# Patient Record
Sex: MALE | Race: WHITE | NOT HISPANIC OR LATINO | Employment: OTHER | URBAN - METROPOLITAN AREA
[De-identification: names, ages, dates, MRNs, and addresses within clinical notes are randomized per-mention and may not be internally consistent; named-entity substitution may affect disease eponyms.]

---

## 2019-01-01 ENCOUNTER — ANESTHESIA (OUTPATIENT)
Dept: SURGERY | Facility: HOSPITAL | Age: 64
DRG: 268 | End: 2019-01-01
Payer: MEDICAID

## 2019-01-01 ENCOUNTER — HOSPITAL ENCOUNTER (INPATIENT)
Facility: HOSPITAL | Age: 64
LOS: 10 days | DRG: 268 | End: 2019-07-17
Attending: EMERGENCY MEDICINE | Admitting: SURGERY
Payer: MEDICAID

## 2019-01-01 ENCOUNTER — TELEPHONE (OUTPATIENT)
Dept: UROLOGY | Facility: CLINIC | Age: 64
End: 2019-01-01

## 2019-01-01 ENCOUNTER — ANESTHESIA EVENT (OUTPATIENT)
Dept: SURGERY | Facility: HOSPITAL | Age: 64
DRG: 268 | End: 2019-01-01
Payer: MEDICAID

## 2019-01-01 ENCOUNTER — HOSPITAL ENCOUNTER (INPATIENT)
Facility: HOSPITAL | Age: 64
LOS: 6 days | Discharge: HOME | DRG: 291 | End: 2019-04-11
Attending: EMERGENCY MEDICINE | Admitting: HOSPITALIST
Payer: MEDICAID

## 2019-01-01 ENCOUNTER — LAB VISIT (OUTPATIENT)
Dept: LAB | Facility: HOSPITAL | Age: 64
End: 2019-01-01
Attending: EMERGENCY MEDICINE
Payer: MEDICAID

## 2019-01-01 ENCOUNTER — CLINICAL SUPPORT (OUTPATIENT)
Dept: SMOKING CESSATION | Facility: CLINIC | Age: 64
DRG: 291 | End: 2019-01-01
Attending: SURGERY
Payer: MEDICAID

## 2019-01-01 VITALS
BODY MASS INDEX: 20.55 KG/M2 | DIASTOLIC BLOOD PRESSURE: 59 MMHG | RESPIRATION RATE: 16 BRPM | HEART RATE: 106 BPM | WEIGHT: 135.56 LBS | TEMPERATURE: 98 F | HEIGHT: 68 IN | SYSTOLIC BLOOD PRESSURE: 123 MMHG | OXYGEN SATURATION: 96 %

## 2019-01-01 VITALS
DIASTOLIC BLOOD PRESSURE: 58 MMHG | HEIGHT: 67 IN | HEART RATE: 88 BPM | SYSTOLIC BLOOD PRESSURE: 110 MMHG | WEIGHT: 141.56 LBS | OXYGEN SATURATION: 97 % | TEMPERATURE: 96 F | RESPIRATION RATE: 14 BRPM | BODY MASS INDEX: 22.22 KG/M2

## 2019-01-01 DIAGNOSIS — I71.40 ABDOMINAL AORTIC ANEURYSM (AAA) WITHOUT RUPTURE: ICD-10-CM

## 2019-01-01 DIAGNOSIS — I71.40 AAA (ABDOMINAL AORTIC ANEURYSM) WITHOUT RUPTURE: Primary | ICD-10-CM

## 2019-01-01 DIAGNOSIS — F17.200 NICOTINE DEPENDENCE: Primary | ICD-10-CM

## 2019-01-01 DIAGNOSIS — I71.40 AAA (ABDOMINAL AORTIC ANEURYSM) WITHOUT RUPTURE: ICD-10-CM

## 2019-01-01 DIAGNOSIS — I25.10 CORONARY ARTERY DISEASE INVOLVING NATIVE CORONARY ARTERY OF NATIVE HEART WITHOUT ANGINA PECTORIS: ICD-10-CM

## 2019-01-01 DIAGNOSIS — I71.40 AAA (ABDOMINAL AORTIC ANEURYSM): ICD-10-CM

## 2019-01-01 DIAGNOSIS — I50.9 CONGESTIVE HEART FAILURE, UNSPECIFIED HF CHRONICITY, UNSPECIFIED HEART FAILURE TYPE: ICD-10-CM

## 2019-01-01 DIAGNOSIS — Z72.0 TOBACCO ABUSE: ICD-10-CM

## 2019-01-01 DIAGNOSIS — I50.9 ACUTE ON CHRONIC HEART FAILURE: ICD-10-CM

## 2019-01-01 DIAGNOSIS — J96.22 ACUTE ON CHRONIC RESPIRATORY FAILURE WITH HYPOXIA AND HYPERCAPNIA: ICD-10-CM

## 2019-01-01 DIAGNOSIS — J96.21 ACUTE ON CHRONIC RESPIRATORY FAILURE WITH HYPOXIA AND HYPERCAPNIA: ICD-10-CM

## 2019-01-01 DIAGNOSIS — I73.9 PERIPHERAL VASCULAR DISEASE: ICD-10-CM

## 2019-01-01 DIAGNOSIS — I21.4 NSTEMI (NON-ST ELEVATED MYOCARDIAL INFARCTION): ICD-10-CM

## 2019-01-01 DIAGNOSIS — N40.0 BENIGN PROSTATIC HYPERPLASIA, UNSPECIFIED WHETHER LOWER URINARY TRACT SYMPTOMS PRESENT: ICD-10-CM

## 2019-01-01 DIAGNOSIS — I71.43 ANEURYSM OF INFRARENAL ABDOMINAL AORTA: ICD-10-CM

## 2019-01-01 DIAGNOSIS — R33.8 ACUTE RETENTION OF URINE: ICD-10-CM

## 2019-01-01 DIAGNOSIS — I49.9 CARDIAC RHYTHM DISORDER OR DISTURBANCE OR CHANGE: ICD-10-CM

## 2019-01-01 DIAGNOSIS — R00.0 TACHYCARDIA: ICD-10-CM

## 2019-01-01 DIAGNOSIS — I25.10 CORONARY ARTERY DISEASE, ANGINA PRESENCE UNSPECIFIED, UNSPECIFIED VESSEL OR LESION TYPE, UNSPECIFIED WHETHER NATIVE OR TRANSPLANTED HEART: ICD-10-CM

## 2019-01-01 DIAGNOSIS — I50.9 ACUTE ON CHRONIC CONGESTIVE HEART FAILURE, UNSPECIFIED HEART FAILURE TYPE: Primary | ICD-10-CM

## 2019-01-01 DIAGNOSIS — R10.9 ABDOMINAL PAIN, UNSPECIFIED ABDOMINAL LOCATION: ICD-10-CM

## 2019-01-01 DIAGNOSIS — I10 HYPERTENSION, UNSPECIFIED TYPE: ICD-10-CM

## 2019-01-01 DIAGNOSIS — Z03.89 ENCOUNTER FOR OBSERVATION FOR OTHER SUSPECTED DISEASES AND CONDITIONS RULED OUT: ICD-10-CM

## 2019-01-01 DIAGNOSIS — Z72.0 TOBACCO USE: ICD-10-CM

## 2019-01-01 DIAGNOSIS — R94.31 PROLONGED QT INTERVAL: ICD-10-CM

## 2019-01-01 DIAGNOSIS — I50.9 HEART FAILURE: ICD-10-CM

## 2019-01-01 DIAGNOSIS — I65.23 BILATERAL CAROTID ARTERY STENOSIS: ICD-10-CM

## 2019-01-01 DIAGNOSIS — I50.9 CHF (CONGESTIVE HEART FAILURE): ICD-10-CM

## 2019-01-01 DIAGNOSIS — I50.43 ACUTE ON CHRONIC COMBINED SYSTOLIC AND DIASTOLIC CONGESTIVE HEART FAILURE: ICD-10-CM

## 2019-01-01 DIAGNOSIS — N18.30 CKD (CHRONIC KIDNEY DISEASE) STAGE 3, GFR 30-59 ML/MIN: ICD-10-CM

## 2019-01-01 DIAGNOSIS — R06.09 EXERTIONAL DYSPNEA: ICD-10-CM

## 2019-01-01 LAB
ABO + RH BLD: NORMAL
ALBUMIN SERPL BCP-MCNC: 2 G/DL (ref 3.5–5.2)
ALBUMIN SERPL BCP-MCNC: 2.1 G/DL (ref 3.5–5.2)
ALBUMIN SERPL BCP-MCNC: 2.2 G/DL (ref 3.5–5.2)
ALBUMIN SERPL BCP-MCNC: 2.2 G/DL (ref 3.5–5.2)
ALBUMIN SERPL BCP-MCNC: 2.3 G/DL (ref 3.5–5.2)
ALBUMIN SERPL BCP-MCNC: 2.4 G/DL (ref 3.5–5.2)
ALBUMIN SERPL BCP-MCNC: 2.4 G/DL (ref 3.5–5.2)
ALBUMIN SERPL BCP-MCNC: 2.5 G/DL (ref 3.5–5.2)
ALBUMIN SERPL BCP-MCNC: 2.6 G/DL (ref 3.5–5.2)
ALBUMIN SERPL BCP-MCNC: 2.7 G/DL (ref 3.5–5.2)
ALBUMIN SERPL BCP-MCNC: 2.7 G/DL (ref 3.5–5.2)
ALBUMIN SERPL BCP-MCNC: 2.8 G/DL (ref 3.5–5.2)
ALBUMIN SERPL BCP-MCNC: 2.9 G/DL (ref 3.5–5.2)
ALBUMIN SERPL BCP-MCNC: 3.1 G/DL (ref 3.5–5.2)
ALLENS TEST: ABNORMAL
ALLENS TEST: NORMAL
ALP SERPL-CCNC: 61 U/L (ref 55–135)
ALP SERPL-CCNC: 62 U/L (ref 55–135)
ALP SERPL-CCNC: 62 U/L (ref 55–135)
ALP SERPL-CCNC: 64 U/L (ref 55–135)
ALP SERPL-CCNC: 65 U/L (ref 55–135)
ALP SERPL-CCNC: 65 U/L (ref 55–135)
ALP SERPL-CCNC: 68 U/L (ref 55–135)
ALP SERPL-CCNC: 70 U/L (ref 55–135)
ALP SERPL-CCNC: 71 U/L (ref 55–135)
ALP SERPL-CCNC: 72 U/L (ref 55–135)
ALP SERPL-CCNC: 75 U/L (ref 55–135)
ALP SERPL-CCNC: 77 U/L (ref 55–135)
ALP SERPL-CCNC: 77 U/L (ref 55–135)
ALP SERPL-CCNC: 78 U/L (ref 55–135)
ALP SERPL-CCNC: 81 U/L (ref 55–135)
ALP SERPL-CCNC: 83 U/L (ref 55–135)
ALP SERPL-CCNC: 83 U/L (ref 55–135)
ALP SERPL-CCNC: 87 U/L (ref 55–135)
ALP SERPL-CCNC: 90 U/L (ref 55–135)
ALP SERPL-CCNC: 95 U/L (ref 55–135)
ALT SERPL W/O P-5'-P-CCNC: 10 U/L (ref 10–44)
ALT SERPL W/O P-5'-P-CCNC: 11 U/L (ref 10–44)
ALT SERPL W/O P-5'-P-CCNC: 12 U/L (ref 10–44)
ALT SERPL W/O P-5'-P-CCNC: 13 U/L (ref 10–44)
ALT SERPL W/O P-5'-P-CCNC: 15 U/L (ref 10–44)
ALT SERPL W/O P-5'-P-CCNC: 17 U/L (ref 10–44)
ALT SERPL W/O P-5'-P-CCNC: 17 U/L (ref 10–44)
ALT SERPL W/O P-5'-P-CCNC: 21 U/L (ref 10–44)
ALT SERPL W/O P-5'-P-CCNC: 22 U/L (ref 10–44)
ALT SERPL W/O P-5'-P-CCNC: 23 U/L (ref 10–44)
ALT SERPL W/O P-5'-P-CCNC: 23 U/L (ref 10–44)
ALT SERPL W/O P-5'-P-CCNC: 25 U/L (ref 10–44)
ALT SERPL W/O P-5'-P-CCNC: 29 U/L (ref 10–44)
ALT SERPL W/O P-5'-P-CCNC: 29 U/L (ref 10–44)
ALT SERPL W/O P-5'-P-CCNC: 30 U/L (ref 10–44)
ALT SERPL W/O P-5'-P-CCNC: 5 U/L (ref 10–44)
ALT SERPL W/O P-5'-P-CCNC: 7 U/L (ref 10–44)
ALT SERPL W/O P-5'-P-CCNC: 7 U/L (ref 10–44)
ALT SERPL W/O P-5'-P-CCNC: 9 U/L (ref 10–44)
ANION GAP SERPL CALC-SCNC: 10 MMOL/L (ref 8–16)
ANION GAP SERPL CALC-SCNC: 11 MMOL/L (ref 8–16)
ANION GAP SERPL CALC-SCNC: 12 MMOL/L (ref 8–16)
ANION GAP SERPL CALC-SCNC: 6 MMOL/L (ref 8–16)
ANION GAP SERPL CALC-SCNC: 7 MMOL/L (ref 8–16)
ANION GAP SERPL CALC-SCNC: 8 MMOL/L (ref 8–16)
ANION GAP SERPL CALC-SCNC: 9 MMOL/L (ref 8–16)
ANISOCYTOSIS BLD QL SMEAR: SLIGHT
ANISOCYTOSIS BLD QL SMEAR: SLIGHT
APTT BLDCRRT: 100.7 SEC (ref 21–32)
APTT BLDCRRT: 28.3 SEC (ref 21–32)
APTT BLDCRRT: 28.6 SEC (ref 21–32)
APTT BLDCRRT: 38.5 SEC (ref 21–32)
APTT BLDCRRT: 61.7 SEC (ref 21–32)
APTT BLDCRRT: 91 SEC (ref 21–32)
ASCENDING AORTA: 3.07 CM
AST SERPL-CCNC: 13 U/L (ref 10–40)
AST SERPL-CCNC: 14 U/L (ref 10–40)
AST SERPL-CCNC: 14 U/L (ref 10–40)
AST SERPL-CCNC: 15 U/L (ref 10–40)
AST SERPL-CCNC: 15 U/L (ref 10–40)
AST SERPL-CCNC: 18 U/L (ref 10–40)
AST SERPL-CCNC: 20 U/L (ref 10–40)
AST SERPL-CCNC: 21 U/L (ref 10–40)
AST SERPL-CCNC: 23 U/L (ref 10–40)
AST SERPL-CCNC: 23 U/L (ref 10–40)
AST SERPL-CCNC: 25 U/L (ref 10–40)
AST SERPL-CCNC: 31 U/L (ref 10–40)
AST SERPL-CCNC: 32 U/L (ref 10–40)
AST SERPL-CCNC: 34 U/L (ref 10–40)
AST SERPL-CCNC: 35 U/L (ref 10–40)
AST SERPL-CCNC: 38 U/L (ref 10–40)
AST SERPL-CCNC: 38 U/L (ref 10–40)
AST SERPL-CCNC: 40 U/L (ref 10–40)
AST SERPL-CCNC: 40 U/L (ref 10–40)
AV INDEX (PROSTH): 0.86
AV MEAN GRADIENT: 2 MMHG
AV PEAK GRADIENT: 3 MMHG
AV VALVE AREA: 3.33 CM2
AV VELOCITY RATIO: 0.82
BACTERIA #/AREA URNS AUTO: ABNORMAL /HPF
BACTERIA BAL AEROBE CULT: ABNORMAL
BACTERIA BLD CULT: NORMAL
BACTERIA BLD CULT: NORMAL
BASO STIPL BLD QL SMEAR: ABNORMAL
BASOPHILS # BLD AUTO: 0.02 K/UL (ref 0–0.2)
BASOPHILS # BLD AUTO: 0.03 K/UL (ref 0–0.2)
BASOPHILS # BLD AUTO: 0.04 K/UL (ref 0–0.2)
BASOPHILS # BLD AUTO: 0.05 K/UL (ref 0–0.2)
BASOPHILS NFR BLD: 0.2 % (ref 0–1.9)
BASOPHILS NFR BLD: 0.3 % (ref 0–1.9)
BASOPHILS NFR BLD: 0.4 % (ref 0–1.9)
BASOPHILS NFR BLD: 0.5 % (ref 0–1.9)
BASOPHILS NFR BLD: 0.6 % (ref 0–1.9)
BASOPHILS NFR BLD: 0.7 % (ref 0–1.9)
BASOPHILS NFR BLD: 0.8 % (ref 0–1.9)
BASOPHILS NFR BLD: 0.9 % (ref 0–1.9)
BILIRUB SERPL-MCNC: 0.3 MG/DL (ref 0.1–1)
BILIRUB SERPL-MCNC: 0.4 MG/DL (ref 0.1–1)
BILIRUB SERPL-MCNC: 0.5 MG/DL (ref 0.1–1)
BILIRUB SERPL-MCNC: 0.6 MG/DL (ref 0.1–1)
BILIRUB SERPL-MCNC: 0.7 MG/DL (ref 0.1–1)
BILIRUB SERPL-MCNC: 0.7 MG/DL (ref 0.1–1)
BILIRUB SERPL-MCNC: 0.9 MG/DL (ref 0.1–1)
BILIRUB SERPL-MCNC: 0.9 MG/DL (ref 0.1–1)
BILIRUB UR QL STRIP: NEGATIVE
BLD GP AB SCN CELLS X3 SERPL QL: NORMAL
BLD PROD TYP BPU: NORMAL
BLD PROD TYP BPU: NORMAL
BLOOD GROUP ANTIBODIES SERPL: NORMAL
BLOOD UNIT EXPIRATION DATE: NORMAL
BLOOD UNIT EXPIRATION DATE: NORMAL
BLOOD UNIT TYPE CODE: 6200
BLOOD UNIT TYPE CODE: 6200
BLOOD UNIT TYPE: NORMAL
BLOOD UNIT TYPE: NORMAL
BNP SERPL-MCNC: 1123 PG/ML (ref 0–99)
BNP SERPL-MCNC: 2198 PG/ML (ref 0–99)
BNP SERPL-MCNC: 2526 PG/ML (ref 0–99)
BSA FOR ECHO PROCEDURE: 1.71 M2
BSA FOR ECHO PROCEDURE: 1.72 M2
BUN SERPL-MCNC: 17 MG/DL (ref 8–23)
BUN SERPL-MCNC: 17 MG/DL (ref 8–23)
BUN SERPL-MCNC: 20 MG/DL (ref 8–23)
BUN SERPL-MCNC: 20 MG/DL (ref 8–23)
BUN SERPL-MCNC: 21 MG/DL (ref 8–23)
BUN SERPL-MCNC: 22 MG/DL (ref 8–23)
BUN SERPL-MCNC: 23 MG/DL (ref 8–23)
BUN SERPL-MCNC: 24 MG/DL (ref 8–23)
BUN SERPL-MCNC: 24 MG/DL (ref 8–23)
BUN SERPL-MCNC: 25 MG/DL (ref 8–23)
BUN SERPL-MCNC: 26 MG/DL (ref 8–23)
BUN SERPL-MCNC: 27 MG/DL (ref 8–23)
BUN SERPL-MCNC: 31 MG/DL (ref 8–23)
BUN SERPL-MCNC: 37 MG/DL (ref 8–23)
CALCIUM SERPL-MCNC: 8.2 MG/DL (ref 8.7–10.5)
CALCIUM SERPL-MCNC: 8.4 MG/DL (ref 8.7–10.5)
CALCIUM SERPL-MCNC: 8.4 MG/DL (ref 8.7–10.5)
CALCIUM SERPL-MCNC: 8.6 MG/DL (ref 8.7–10.5)
CALCIUM SERPL-MCNC: 8.7 MG/DL (ref 8.7–10.5)
CALCIUM SERPL-MCNC: 8.7 MG/DL (ref 8.7–10.5)
CALCIUM SERPL-MCNC: 8.8 MG/DL (ref 8.7–10.5)
CALCIUM SERPL-MCNC: 9.1 MG/DL (ref 8.7–10.5)
CALCIUM SERPL-MCNC: 9.2 MG/DL (ref 8.7–10.5)
CALCIUM SERPL-MCNC: 9.3 MG/DL (ref 8.7–10.5)
CALCIUM SERPL-MCNC: 9.4 MG/DL (ref 8.7–10.5)
CALCIUM SERPL-MCNC: 9.6 MG/DL (ref 8.7–10.5)
CALCIUM SERPL-MCNC: 9.8 MG/DL (ref 8.7–10.5)
CEA SERPL-MCNC: 1.9 NG/ML (ref 0–5)
CHLORIDE SERPL-SCNC: 100 MMOL/L (ref 95–110)
CHLORIDE SERPL-SCNC: 101 MMOL/L (ref 95–110)
CHLORIDE SERPL-SCNC: 102 MMOL/L (ref 95–110)
CHLORIDE SERPL-SCNC: 103 MMOL/L (ref 95–110)
CHLORIDE SERPL-SCNC: 104 MMOL/L (ref 95–110)
CHLORIDE SERPL-SCNC: 105 MMOL/L (ref 95–110)
CHLORIDE SERPL-SCNC: 94 MMOL/L (ref 95–110)
CHLORIDE SERPL-SCNC: 94 MMOL/L (ref 95–110)
CHLORIDE SERPL-SCNC: 95 MMOL/L (ref 95–110)
CHLORIDE SERPL-SCNC: 96 MMOL/L (ref 95–110)
CHLORIDE SERPL-SCNC: 97 MMOL/L (ref 95–110)
CHLORIDE SERPL-SCNC: 98 MMOL/L (ref 95–110)
CHLORIDE SERPL-SCNC: 98 MMOL/L (ref 95–110)
CHLORIDE SERPL-SCNC: 99 MMOL/L (ref 95–110)
CLARITY UR REFRACT.AUTO: CLEAR
CO2 SERPL-SCNC: 21 MMOL/L (ref 23–29)
CO2 SERPL-SCNC: 23 MMOL/L (ref 23–29)
CO2 SERPL-SCNC: 24 MMOL/L (ref 23–29)
CO2 SERPL-SCNC: 25 MMOL/L (ref 23–29)
CO2 SERPL-SCNC: 26 MMOL/L (ref 23–29)
CO2 SERPL-SCNC: 27 MMOL/L (ref 23–29)
CO2 SERPL-SCNC: 29 MMOL/L (ref 23–29)
CODING SYSTEM: NORMAL
CODING SYSTEM: NORMAL
COLOR UR AUTO: YELLOW
CREAT SERPL-MCNC: 1 MG/DL (ref 0.5–1.4)
CREAT SERPL-MCNC: 1.1 MG/DL (ref 0.5–1.4)
CREAT SERPL-MCNC: 1.2 MG/DL (ref 0.5–1.4)
CREAT SERPL-MCNC: 1.3 MG/DL (ref 0.5–1.4)
CREAT SERPL-MCNC: 1.3 MG/DL (ref 0.5–1.4)
CREAT SERPL-MCNC: 1.4 MG/DL (ref 0.5–1.4)
CREAT SERPL-MCNC: 1.5 MG/DL (ref 0.5–1.4)
CREAT SERPL-MCNC: 1.5 MG/DL (ref 0.5–1.4)
CREAT SERPL-MCNC: 1.6 MG/DL (ref 0.5–1.4)
CREAT SERPL-MCNC: 1.7 MG/DL (ref 0.5–1.4)
CREAT SERPL-MCNC: 1.8 MG/DL (ref 0.5–1.4)
CV ECHO LV RWT: 0.22 CM
CV ECHO LV RWT: 0.39 CM
DELSYS: ABNORMAL
DELSYS: NORMAL
DIFFERENTIAL METHOD: ABNORMAL
DISPENSE STATUS: NORMAL
DISPENSE STATUS: NORMAL
DOP CALC AO PEAK VEL: 0.9 M/S
DOP CALC AO VTI: 12.22 CM
DOP CALC LVOT AREA: 3.9 CM2
DOP CALC LVOT AREA: 4.1 CM2
DOP CALC LVOT DIAMETER: 2.22 CM
DOP CALC LVOT DIAMETER: 2.29 CM
DOP CALC LVOT PEAK VEL: 0.71 M/S
DOP CALC LVOT PEAK VEL: 0.74 M/S
DOP CALC LVOT STROKE VOLUME: 40.74 CM3
DOP CALC LVOT STROKE VOLUME: 43.72 CM3
DOP CALCLVOT PEAK VEL VTI: 10.53 CM
DOP CALCLVOT PEAK VEL VTI: 10.62 CM
E/E' RATIO: 6 M/S
ECHO LV POSTERIOR WALL: 0.62 CM (ref 0.6–1.1)
ECHO LV POSTERIOR WALL: 0.98 CM (ref 0.6–1.1)
EOSINOPHIL # BLD AUTO: 0 K/UL (ref 0–0.5)
EOSINOPHIL # BLD AUTO: 0.1 K/UL (ref 0–0.5)
EOSINOPHIL NFR BLD: 0 % (ref 0–8)
EOSINOPHIL NFR BLD: 0 % (ref 0–8)
EOSINOPHIL NFR BLD: 0.1 % (ref 0–8)
EOSINOPHIL NFR BLD: 0.2 % (ref 0–8)
EOSINOPHIL NFR BLD: 0.3 % (ref 0–8)
EOSINOPHIL NFR BLD: 0.4 % (ref 0–8)
EOSINOPHIL NFR BLD: 0.4 % (ref 0–8)
EOSINOPHIL NFR BLD: 0.5 % (ref 0–8)
EOSINOPHIL NFR BLD: 0.6 % (ref 0–8)
EOSINOPHIL NFR BLD: 0.7 % (ref 0–8)
EOSINOPHIL NFR BLD: 0.7 % (ref 0–8)
EOSINOPHIL NFR BLD: 1 % (ref 0–8)
EOSINOPHIL NFR BLD: 1.4 % (ref 0–8)
EOSINOPHIL NFR BLD: 1.8 % (ref 0–8)
EOSINOPHIL NFR BLD: 2.6 % (ref 0–8)
EOSINOPHIL NFR BLD: 3.4 % (ref 0–8)
ERYTHROCYTE [DISTWIDTH] IN BLOOD BY AUTOMATED COUNT: 14.4 % (ref 11.5–14.5)
ERYTHROCYTE [DISTWIDTH] IN BLOOD BY AUTOMATED COUNT: 14.4 % (ref 11.5–14.5)
ERYTHROCYTE [DISTWIDTH] IN BLOOD BY AUTOMATED COUNT: 14.5 % (ref 11.5–14.5)
ERYTHROCYTE [DISTWIDTH] IN BLOOD BY AUTOMATED COUNT: 14.6 % (ref 11.5–14.5)
ERYTHROCYTE [DISTWIDTH] IN BLOOD BY AUTOMATED COUNT: 14.6 % (ref 11.5–14.5)
ERYTHROCYTE [DISTWIDTH] IN BLOOD BY AUTOMATED COUNT: 15.6 % (ref 11.5–14.5)
ERYTHROCYTE [DISTWIDTH] IN BLOOD BY AUTOMATED COUNT: 15.6 % (ref 11.5–14.5)
ERYTHROCYTE [DISTWIDTH] IN BLOOD BY AUTOMATED COUNT: 15.7 % (ref 11.5–14.5)
ERYTHROCYTE [DISTWIDTH] IN BLOOD BY AUTOMATED COUNT: 15.7 % (ref 11.5–14.5)
ERYTHROCYTE [DISTWIDTH] IN BLOOD BY AUTOMATED COUNT: 15.8 % (ref 11.5–14.5)
ERYTHROCYTE [DISTWIDTH] IN BLOOD BY AUTOMATED COUNT: 15.9 % (ref 11.5–14.5)
ERYTHROCYTE [DISTWIDTH] IN BLOOD BY AUTOMATED COUNT: 16 % (ref 11.5–14.5)
ERYTHROCYTE [DISTWIDTH] IN BLOOD BY AUTOMATED COUNT: 16.1 % (ref 11.5–14.5)
ERYTHROCYTE [DISTWIDTH] IN BLOOD BY AUTOMATED COUNT: 16.3 % (ref 11.5–14.5)
ERYTHROCYTE [DISTWIDTH] IN BLOOD BY AUTOMATED COUNT: 16.4 % (ref 11.5–14.5)
ERYTHROCYTE [DISTWIDTH] IN BLOOD BY AUTOMATED COUNT: 16.5 % (ref 11.5–14.5)
ERYTHROCYTE [SEDIMENTATION RATE] IN BLOOD BY WESTERGREN METHOD: 16 MM/H
ERYTHROCYTE [SEDIMENTATION RATE] IN BLOOD BY WESTERGREN METHOD: 21 MM/H
EST. GFR  (AFRICAN AMERICAN): 45.3 ML/MIN/1.73 M^2
EST. GFR  (AFRICAN AMERICAN): 48.5 ML/MIN/1.73 M^2
EST. GFR  (AFRICAN AMERICAN): 52.2 ML/MIN/1.73 M^2
EST. GFR  (AFRICAN AMERICAN): 56.5 ML/MIN/1.73 M^2
EST. GFR  (AFRICAN AMERICAN): 56.5 ML/MIN/1.73 M^2
EST. GFR  (AFRICAN AMERICAN): >60 ML/MIN/1.73 M^2
EST. GFR  (NON AFRICAN AMERICAN): 39.2 ML/MIN/1.73 M^2
EST. GFR  (NON AFRICAN AMERICAN): 42 ML/MIN/1.73 M^2
EST. GFR  (NON AFRICAN AMERICAN): 45.2 ML/MIN/1.73 M^2
EST. GFR  (NON AFRICAN AMERICAN): 48.8 ML/MIN/1.73 M^2
EST. GFR  (NON AFRICAN AMERICAN): 48.8 ML/MIN/1.73 M^2
EST. GFR  (NON AFRICAN AMERICAN): 52.7 ML/MIN/1.73 M^2
EST. GFR  (NON AFRICAN AMERICAN): 52.7 ML/MIN/1.73 M^2
EST. GFR  (NON AFRICAN AMERICAN): 53.1 ML/MIN/1.73 M^2
EST. GFR  (NON AFRICAN AMERICAN): 58.1 ML/MIN/1.73 M^2
EST. GFR  (NON AFRICAN AMERICAN): 58.1 ML/MIN/1.73 M^2
EST. GFR  (NON AFRICAN AMERICAN): >60 ML/MIN/1.73 M^2
FIO2: 21
FIO2: 28
FIO2: 50
FLOW: 2
FRACTIONAL SHORTENING: 15 % (ref 28–44)
FRACTIONAL SHORTENING: 7 % (ref 28–44)
GLUCOSE SERPL-MCNC: 111 MG/DL (ref 70–110)
GLUCOSE SERPL-MCNC: 112 MG/DL (ref 70–110)
GLUCOSE SERPL-MCNC: 121 MG/DL (ref 70–110)
GLUCOSE SERPL-MCNC: 130 MG/DL (ref 70–110)
GLUCOSE SERPL-MCNC: 69 MG/DL (ref 70–110)
GLUCOSE SERPL-MCNC: 70 MG/DL (ref 70–110)
GLUCOSE SERPL-MCNC: 70 MG/DL (ref 70–110)
GLUCOSE SERPL-MCNC: 72 MG/DL (ref 70–110)
GLUCOSE SERPL-MCNC: 75 MG/DL (ref 70–110)
GLUCOSE SERPL-MCNC: 76 MG/DL (ref 70–110)
GLUCOSE SERPL-MCNC: 76 MG/DL (ref 70–110)
GLUCOSE SERPL-MCNC: 81 MG/DL (ref 70–110)
GLUCOSE SERPL-MCNC: 82 MG/DL (ref 70–110)
GLUCOSE SERPL-MCNC: 84 MG/DL (ref 70–110)
GLUCOSE SERPL-MCNC: 85 MG/DL (ref 70–110)
GLUCOSE SERPL-MCNC: 85 MG/DL (ref 70–110)
GLUCOSE SERPL-MCNC: 91 MG/DL (ref 70–110)
GLUCOSE SERPL-MCNC: 91 MG/DL (ref 70–110)
GLUCOSE SERPL-MCNC: 92 MG/DL (ref 70–110)
GLUCOSE SERPL-MCNC: 94 MG/DL (ref 70–110)
GLUCOSE SERPL-MCNC: 95 MG/DL (ref 70–110)
GLUCOSE SERPL-MCNC: 95 MG/DL (ref 70–110)
GLUCOSE SERPL-MCNC: 98 MG/DL (ref 70–110)
GLUCOSE UR QL STRIP: NEGATIVE
GRAM STN SPEC: ABNORMAL
HCO3 UR-SCNC: 24.1 MMOL/L (ref 24–28)
HCO3 UR-SCNC: 25.1 MMOL/L (ref 24–28)
HCO3 UR-SCNC: 27.1 MMOL/L (ref 24–28)
HCO3 UR-SCNC: 30.2 MMOL/L (ref 24–28)
HCO3 UR-SCNC: 30.5 MMOL/L (ref 24–28)
HCO3 UR-SCNC: 30.8 MMOL/L (ref 24–28)
HCT VFR BLD AUTO: 29.4 % (ref 40–54)
HCT VFR BLD AUTO: 30 % (ref 40–54)
HCT VFR BLD AUTO: 31.6 % (ref 40–54)
HCT VFR BLD AUTO: 31.8 % (ref 40–54)
HCT VFR BLD AUTO: 32 % (ref 40–54)
HCT VFR BLD AUTO: 32.4 % (ref 40–54)
HCT VFR BLD AUTO: 33.1 % (ref 40–54)
HCT VFR BLD AUTO: 33.5 % (ref 40–54)
HCT VFR BLD AUTO: 33.6 % (ref 40–54)
HCT VFR BLD AUTO: 33.7 % (ref 40–54)
HCT VFR BLD AUTO: 34.6 % (ref 40–54)
HCT VFR BLD AUTO: 35 % (ref 40–54)
HCT VFR BLD AUTO: 35.7 % (ref 40–54)
HCT VFR BLD AUTO: 35.7 % (ref 40–54)
HCT VFR BLD AUTO: 36.4 % (ref 40–54)
HCT VFR BLD AUTO: 36.9 % (ref 40–54)
HCT VFR BLD AUTO: 36.9 % (ref 40–54)
HCT VFR BLD AUTO: 37 % (ref 40–54)
HCT VFR BLD AUTO: 39.4 % (ref 40–54)
HCT VFR BLD AUTO: 39.8 % (ref 40–54)
HCT VFR BLD AUTO: 42.6 % (ref 40–54)
HCT VFR BLD AUTO: 43 % (ref 40–54)
HCT VFR BLD CALC: 31 %PCV (ref 36–54)
HCT VFR BLD CALC: 34 %PCV (ref 36–54)
HGB BLD-MCNC: 10 G/DL (ref 14–18)
HGB BLD-MCNC: 10.3 G/DL (ref 14–18)
HGB BLD-MCNC: 10.4 G/DL (ref 14–18)
HGB BLD-MCNC: 10.5 G/DL (ref 14–18)
HGB BLD-MCNC: 10.7 G/DL (ref 14–18)
HGB BLD-MCNC: 10.8 G/DL (ref 14–18)
HGB BLD-MCNC: 10.9 G/DL (ref 14–18)
HGB BLD-MCNC: 11.3 G/DL (ref 14–18)
HGB BLD-MCNC: 11.4 G/DL (ref 14–18)
HGB BLD-MCNC: 11.6 G/DL (ref 14–18)
HGB BLD-MCNC: 12 G/DL (ref 14–18)
HGB BLD-MCNC: 12.1 G/DL (ref 14–18)
HGB BLD-MCNC: 12.3 G/DL (ref 14–18)
HGB BLD-MCNC: 12.8 G/DL (ref 14–18)
HGB BLD-MCNC: 13.6 G/DL (ref 14–18)
HGB BLD-MCNC: 13.9 G/DL (ref 14–18)
HGB BLD-MCNC: 9.4 G/DL (ref 14–18)
HGB BLD-MCNC: 9.4 G/DL (ref 14–18)
HGB BLD-MCNC: 9.9 G/DL (ref 14–18)
HGB UR QL STRIP: ABNORMAL
HYALINE CASTS UR QL AUTO: 0 /LPF
IMM GRANULOCYTES # BLD AUTO: 0.01 K/UL (ref 0–0.04)
IMM GRANULOCYTES # BLD AUTO: 0.02 K/UL (ref 0–0.04)
IMM GRANULOCYTES # BLD AUTO: 0.03 K/UL (ref 0–0.04)
IMM GRANULOCYTES # BLD AUTO: 0.04 K/UL (ref 0–0.04)
IMM GRANULOCYTES # BLD AUTO: 0.04 K/UL (ref 0–0.04)
IMM GRANULOCYTES # BLD AUTO: 0.06 K/UL (ref 0–0.04)
IMM GRANULOCYTES # BLD AUTO: 0.07 K/UL (ref 0–0.04)
IMM GRANULOCYTES # BLD AUTO: 0.08 K/UL (ref 0–0.04)
IMM GRANULOCYTES # BLD AUTO: 0.09 K/UL (ref 0–0.04)
IMM GRANULOCYTES # BLD AUTO: 0.09 K/UL (ref 0–0.04)
IMM GRANULOCYTES # BLD AUTO: 0.1 K/UL (ref 0–0.04)
IMM GRANULOCYTES # BLD AUTO: 0.11 K/UL (ref 0–0.04)
IMM GRANULOCYTES # BLD AUTO: 0.12 K/UL (ref 0–0.04)
IMM GRANULOCYTES NFR BLD AUTO: 0.2 % (ref 0–0.5)
IMM GRANULOCYTES NFR BLD AUTO: 0.4 % (ref 0–0.5)
IMM GRANULOCYTES NFR BLD AUTO: 0.4 % (ref 0–0.5)
IMM GRANULOCYTES NFR BLD AUTO: 0.5 % (ref 0–0.5)
IMM GRANULOCYTES NFR BLD AUTO: 0.5 % (ref 0–0.5)
IMM GRANULOCYTES NFR BLD AUTO: 0.6 % (ref 0–0.5)
IMM GRANULOCYTES NFR BLD AUTO: 0.7 % (ref 0–0.5)
IMM GRANULOCYTES NFR BLD AUTO: 0.8 % (ref 0–0.5)
IMM GRANULOCYTES NFR BLD AUTO: 0.9 % (ref 0–0.5)
IMM GRANULOCYTES NFR BLD AUTO: 0.9 % (ref 0–0.5)
IMM GRANULOCYTES NFR BLD AUTO: 1.1 % (ref 0–0.5)
IMM GRANULOCYTES NFR BLD AUTO: 1.1 % (ref 0–0.5)
IMM GRANULOCYTES NFR BLD AUTO: 1.2 % (ref 0–0.5)
IMM GRANULOCYTES NFR BLD AUTO: 1.3 % (ref 0–0.5)
IMM GRANULOCYTES NFR BLD AUTO: 1.4 % (ref 0–0.5)
IMM GRANULOCYTES NFR BLD AUTO: 1.8 % (ref 0–0.5)
IMM GRANULOCYTES NFR BLD AUTO: 1.8 % (ref 0–0.5)
INR PPP: 1.1 (ref 0.8–1.2)
INR PPP: 1.1 (ref 0.8–1.2)
INR PPP: 1.2 (ref 0.8–1.2)
INTERVENTRICULAR SEPTUM: 0.66 CM (ref 0.6–1.1)
INTERVENTRICULAR SEPTUM: 1.01 CM (ref 0.6–1.1)
KETONES UR QL STRIP: ABNORMAL
LA MAJOR: 4.78 CM
LA MAJOR: 5.88 CM
LA MINOR: 5.21 CM
LA MINOR: 5.95 CM
LA WIDTH: 4.05 CM
LA WIDTH: 4.78 CM
LACTATE SERPL-SCNC: 0.7 MMOL/L (ref 0.5–2.2)
LDH SERPL L TO P-CCNC: 0.99 MMOL/L (ref 0.36–1.25)
LEFT ATRIUM SIZE: 3.46 CM
LEFT ATRIUM SIZE: 4.5 CM
LEFT ATRIUM VOLUME INDEX: 43.3 ML/M2
LEFT ATRIUM VOLUME INDEX: 49.6 ML/M2
LEFT ATRIUM VOLUME: 74.52 CM3
LEFT ATRIUM VOLUME: 85.59 CM3
LEFT INTERNAL DIMENSION IN SYSTOLE: 4.75 CM (ref 2.1–4)
LEFT INTERNAL DIMENSION IN SYSTOLE: 4.88 CM (ref 2.1–4)
LEFT VENTRICLE DIASTOLIC VOLUME INDEX: 71.46 ML/M2
LEFT VENTRICLE DIASTOLIC VOLUME INDEX: 94.97 ML/M2
LEFT VENTRICLE DIASTOLIC VOLUME: 123.03 ML
LEFT VENTRICLE DIASTOLIC VOLUME: 164.01 ML
LEFT VENTRICLE MASS INDEX: 103 G/M2
LEFT VENTRICLE MASS INDEX: 84 G/M2
LEFT VENTRICLE SYSTOLIC VOLUME INDEX: 60.8 ML/M2
LEFT VENTRICLE SYSTOLIC VOLUME INDEX: 64.8 ML/M2
LEFT VENTRICLE SYSTOLIC VOLUME: 104.73 ML
LEFT VENTRICLE SYSTOLIC VOLUME: 111.91 ML
LEFT VENTRICULAR INTERNAL DIMENSION IN DIASTOLE: 5.09 CM (ref 3.5–6)
LEFT VENTRICULAR INTERNAL DIMENSION IN DIASTOLE: 5.76 CM (ref 3.5–6)
LEFT VENTRICULAR MASS: 144.49 G
LEFT VENTRICULAR MASS: 177.38 G
LEUKOCYTE ESTERASE UR QL STRIP: ABNORMAL
LV LATERAL E/E' RATIO: 6.75 M/S
LV SEPTAL E/E' RATIO: 5.4 M/S
LYMPHOCYTES # BLD AUTO: 0.7 K/UL (ref 1–4.8)
LYMPHOCYTES # BLD AUTO: 0.8 K/UL (ref 1–4.8)
LYMPHOCYTES # BLD AUTO: 0.9 K/UL (ref 1–4.8)
LYMPHOCYTES # BLD AUTO: 1 K/UL (ref 1–4.8)
LYMPHOCYTES # BLD AUTO: 1.1 K/UL (ref 1–4.8)
LYMPHOCYTES # BLD AUTO: 1.2 K/UL (ref 1–4.8)
LYMPHOCYTES # BLD AUTO: 1.2 K/UL (ref 1–4.8)
LYMPHOCYTES # BLD AUTO: 1.4 K/UL (ref 1–4.8)
LYMPHOCYTES # BLD AUTO: 1.6 K/UL (ref 1–4.8)
LYMPHOCYTES # BLD AUTO: 1.6 K/UL (ref 1–4.8)
LYMPHOCYTES NFR BLD: 10.1 % (ref 18–48)
LYMPHOCYTES NFR BLD: 10.2 % (ref 18–48)
LYMPHOCYTES NFR BLD: 10.5 % (ref 18–48)
LYMPHOCYTES NFR BLD: 11.7 % (ref 18–48)
LYMPHOCYTES NFR BLD: 14 % (ref 18–48)
LYMPHOCYTES NFR BLD: 14.5 % (ref 18–48)
LYMPHOCYTES NFR BLD: 15.8 % (ref 18–48)
LYMPHOCYTES NFR BLD: 16.8 % (ref 18–48)
LYMPHOCYTES NFR BLD: 18.2 % (ref 18–48)
LYMPHOCYTES NFR BLD: 19.7 % (ref 18–48)
LYMPHOCYTES NFR BLD: 19.7 % (ref 18–48)
LYMPHOCYTES NFR BLD: 21.6 % (ref 18–48)
LYMPHOCYTES NFR BLD: 22.8 % (ref 18–48)
LYMPHOCYTES NFR BLD: 24.1 % (ref 18–48)
LYMPHOCYTES NFR BLD: 37 % (ref 18–48)
LYMPHOCYTES NFR BLD: 37.8 % (ref 18–48)
LYMPHOCYTES NFR BLD: 4.3 % (ref 18–48)
LYMPHOCYTES NFR BLD: 4.7 % (ref 18–48)
LYMPHOCYTES NFR BLD: 5.1 % (ref 18–48)
LYMPHOCYTES NFR BLD: 5.3 % (ref 18–48)
LYMPHOCYTES NFR BLD: 5.4 % (ref 18–48)
LYMPHOCYTES NFR BLD: 7.7 % (ref 18–48)
MAGNESIUM SERPL-MCNC: 1.8 MG/DL (ref 1.6–2.6)
MAGNESIUM SERPL-MCNC: 1.9 MG/DL (ref 1.6–2.6)
MAGNESIUM SERPL-MCNC: 2 MG/DL (ref 1.6–2.6)
MAGNESIUM SERPL-MCNC: 2.1 MG/DL (ref 1.6–2.6)
MAGNESIUM SERPL-MCNC: 2.1 MG/DL (ref 1.6–2.6)
MAGNESIUM SERPL-MCNC: 2.2 MG/DL (ref 1.6–2.6)
MAGNESIUM SERPL-MCNC: 2.2 MG/DL (ref 1.6–2.6)
MAGNESIUM SERPL-MCNC: 2.3 MG/DL (ref 1.6–2.6)
MAGNESIUM SERPL-MCNC: 2.3 MG/DL (ref 1.6–2.6)
MAGNESIUM SERPL-MCNC: 2.5 MG/DL (ref 1.6–2.6)
MCH RBC QN AUTO: 25.3 PG (ref 27–31)
MCH RBC QN AUTO: 25.4 PG (ref 27–31)
MCH RBC QN AUTO: 25.4 PG (ref 27–31)
MCH RBC QN AUTO: 25.5 PG (ref 27–31)
MCH RBC QN AUTO: 25.6 PG (ref 27–31)
MCH RBC QN AUTO: 25.7 PG (ref 27–31)
MCH RBC QN AUTO: 25.8 PG (ref 27–31)
MCH RBC QN AUTO: 25.9 PG (ref 27–31)
MCH RBC QN AUTO: 26 PG (ref 27–31)
MCH RBC QN AUTO: 26.2 PG (ref 27–31)
MCH RBC QN AUTO: 26.3 PG (ref 27–31)
MCH RBC QN AUTO: 26.3 PG (ref 27–31)
MCHC RBC AUTO-ENTMCNC: 30.9 G/DL (ref 32–36)
MCHC RBC AUTO-ENTMCNC: 31.2 G/DL (ref 32–36)
MCHC RBC AUTO-ENTMCNC: 31.3 G/DL (ref 32–36)
MCHC RBC AUTO-ENTMCNC: 31.3 G/DL (ref 32–36)
MCHC RBC AUTO-ENTMCNC: 31.4 G/DL (ref 32–36)
MCHC RBC AUTO-ENTMCNC: 31.6 G/DL (ref 32–36)
MCHC RBC AUTO-ENTMCNC: 31.7 G/DL (ref 32–36)
MCHC RBC AUTO-ENTMCNC: 31.8 G/DL (ref 32–36)
MCHC RBC AUTO-ENTMCNC: 31.8 G/DL (ref 32–36)
MCHC RBC AUTO-ENTMCNC: 31.9 G/DL (ref 32–36)
MCHC RBC AUTO-ENTMCNC: 32 G/DL (ref 32–36)
MCHC RBC AUTO-ENTMCNC: 32.2 G/DL (ref 32–36)
MCHC RBC AUTO-ENTMCNC: 32.2 G/DL (ref 32–36)
MCHC RBC AUTO-ENTMCNC: 32.3 G/DL (ref 32–36)
MCHC RBC AUTO-ENTMCNC: 32.3 G/DL (ref 32–36)
MCHC RBC AUTO-ENTMCNC: 32.4 G/DL (ref 32–36)
MCHC RBC AUTO-ENTMCNC: 32.5 G/DL (ref 32–36)
MCHC RBC AUTO-ENTMCNC: 32.6 G/DL (ref 32–36)
MCHC RBC AUTO-ENTMCNC: 32.7 G/DL (ref 32–36)
MCHC RBC AUTO-ENTMCNC: 32.8 G/DL (ref 32–36)
MCV RBC AUTO: 79 FL (ref 82–98)
MCV RBC AUTO: 80 FL (ref 82–98)
MCV RBC AUTO: 81 FL (ref 82–98)
MCV RBC AUTO: 82 FL (ref 82–98)
MCV RBC AUTO: 83 FL (ref 82–98)
MICROSCOPIC COMMENT: ABNORMAL
MIN VOL: 7.78
MODE: ABNORMAL
MODE: NORMAL
MONOCYTES # BLD AUTO: 0.4 K/UL (ref 0.3–1)
MONOCYTES # BLD AUTO: 0.7 K/UL (ref 0.3–1)
MONOCYTES # BLD AUTO: 0.8 K/UL (ref 0.3–1)
MONOCYTES # BLD AUTO: 0.9 K/UL (ref 0.3–1)
MONOCYTES # BLD AUTO: 1 K/UL (ref 0.3–1)
MONOCYTES # BLD AUTO: 1.1 K/UL (ref 0.3–1)
MONOCYTES # BLD AUTO: 1.1 K/UL (ref 0.3–1)
MONOCYTES # BLD AUTO: 1.2 K/UL (ref 0.3–1)
MONOCYTES # BLD AUTO: 1.5 K/UL (ref 0.3–1)
MONOCYTES # BLD AUTO: 1.5 K/UL (ref 0.3–1)
MONOCYTES # BLD AUTO: 1.6 K/UL (ref 0.3–1)
MONOCYTES # BLD AUTO: 1.7 K/UL (ref 0.3–1)
MONOCYTES # BLD AUTO: 1.7 K/UL (ref 0.3–1)
MONOCYTES # BLD AUTO: 2 K/UL (ref 0.3–1)
MONOCYTES # BLD AUTO: 2.1 K/UL (ref 0.3–1)
MONOCYTES # BLD AUTO: 2.2 K/UL (ref 0.3–1)
MONOCYTES NFR BLD: 10.2 % (ref 4–15)
MONOCYTES NFR BLD: 10.2 % (ref 4–15)
MONOCYTES NFR BLD: 12.1 % (ref 4–15)
MONOCYTES NFR BLD: 12.1 % (ref 4–15)
MONOCYTES NFR BLD: 12.4 % (ref 4–15)
MONOCYTES NFR BLD: 13.4 % (ref 4–15)
MONOCYTES NFR BLD: 13.9 % (ref 4–15)
MONOCYTES NFR BLD: 14.1 % (ref 4–15)
MONOCYTES NFR BLD: 14.2 % (ref 4–15)
MONOCYTES NFR BLD: 14.9 % (ref 4–15)
MONOCYTES NFR BLD: 14.9 % (ref 4–15)
MONOCYTES NFR BLD: 15.2 % (ref 4–15)
MONOCYTES NFR BLD: 16.5 % (ref 4–15)
MONOCYTES NFR BLD: 17 % (ref 4–15)
MONOCYTES NFR BLD: 18.5 % (ref 4–15)
MONOCYTES NFR BLD: 18.9 % (ref 4–15)
MONOCYTES NFR BLD: 19.2 % (ref 4–15)
MONOCYTES NFR BLD: 19.3 % (ref 4–15)
MONOCYTES NFR BLD: 9.9 % (ref 4–15)
MONOCYTES NFR BLD: 9.9 % (ref 4–15)
MV PEAK E VEL: 0.81 M/S
NEUTROPHILS # BLD AUTO: 1.7 K/UL (ref 1.8–7.7)
NEUTROPHILS # BLD AUTO: 12.6 K/UL (ref 1.8–7.7)
NEUTROPHILS # BLD AUTO: 12.9 K/UL (ref 1.8–7.7)
NEUTROPHILS # BLD AUTO: 12.9 K/UL (ref 1.8–7.7)
NEUTROPHILS # BLD AUTO: 13.4 K/UL (ref 1.8–7.7)
NEUTROPHILS # BLD AUTO: 14.8 K/UL (ref 1.8–7.7)
NEUTROPHILS # BLD AUTO: 2.1 K/UL (ref 1.8–7.7)
NEUTROPHILS # BLD AUTO: 3 K/UL (ref 1.8–7.7)
NEUTROPHILS # BLD AUTO: 3.4 K/UL (ref 1.8–7.7)
NEUTROPHILS # BLD AUTO: 3.5 K/UL (ref 1.8–7.7)
NEUTROPHILS # BLD AUTO: 3.5 K/UL (ref 1.8–7.7)
NEUTROPHILS # BLD AUTO: 3.7 K/UL (ref 1.8–7.7)
NEUTROPHILS # BLD AUTO: 3.8 K/UL (ref 1.8–7.7)
NEUTROPHILS # BLD AUTO: 3.9 K/UL (ref 1.8–7.7)
NEUTROPHILS # BLD AUTO: 4.3 K/UL (ref 1.8–7.7)
NEUTROPHILS # BLD AUTO: 4.9 K/UL (ref 1.8–7.7)
NEUTROPHILS # BLD AUTO: 5.9 K/UL (ref 1.8–7.7)
NEUTROPHILS # BLD AUTO: 6.1 K/UL (ref 1.8–7.7)
NEUTROPHILS # BLD AUTO: 6.7 K/UL (ref 1.8–7.7)
NEUTROPHILS # BLD AUTO: 7.7 K/UL (ref 1.8–7.7)
NEUTROPHILS NFR BLD: 41.4 % (ref 38–73)
NEUTROPHILS NFR BLD: 49 % (ref 38–73)
NEUTROPHILS NFR BLD: 59.2 % (ref 38–73)
NEUTROPHILS NFR BLD: 59.6 % (ref 38–73)
NEUTROPHILS NFR BLD: 59.7 % (ref 38–73)
NEUTROPHILS NFR BLD: 60.8 % (ref 38–73)
NEUTROPHILS NFR BLD: 63.3 % (ref 38–73)
NEUTROPHILS NFR BLD: 65.3 % (ref 38–73)
NEUTROPHILS NFR BLD: 65.3 % (ref 38–73)
NEUTROPHILS NFR BLD: 66 % (ref 38–73)
NEUTROPHILS NFR BLD: 66.5 % (ref 38–73)
NEUTROPHILS NFR BLD: 66.6 % (ref 38–73)
NEUTROPHILS NFR BLD: 68.5 % (ref 38–73)
NEUTROPHILS NFR BLD: 68.9 % (ref 38–73)
NEUTROPHILS NFR BLD: 71.1 % (ref 38–73)
NEUTROPHILS NFR BLD: 72.6 % (ref 38–73)
NEUTROPHILS NFR BLD: 80 % (ref 38–73)
NEUTROPHILS NFR BLD: 81.3 % (ref 38–73)
NEUTROPHILS NFR BLD: 83.8 % (ref 38–73)
NEUTROPHILS NFR BLD: 84.4 % (ref 38–73)
NITRITE UR QL STRIP: NEGATIVE
NRBC BLD-RTO: 0 /100 WBC
OVALOCYTES BLD QL SMEAR: ABNORMAL
PCO2 BLDA: 33.4 MMHG (ref 35–45)
PCO2 BLDA: 36.7 MMHG (ref 35–45)
PCO2 BLDA: 40.3 MMHG (ref 35–45)
PCO2 BLDA: 46.6 MMHG (ref 35–45)
PCO2 BLDA: 48.2 MMHG (ref 35–45)
PCO2 BLDA: 48.8 MMHG (ref 35–45)
PEEP: 5
PH SMN: 7.37 [PH] (ref 7.35–7.45)
PH SMN: 7.4 [PH] (ref 7.35–7.45)
PH SMN: 7.41 [PH] (ref 7.35–7.45)
PH SMN: 7.43 [PH] (ref 7.35–7.45)
PH SMN: 7.48 [PH] (ref 7.35–7.45)
PH SMN: 7.49 [PH] (ref 7.35–7.45)
PH UR STRIP: 6 [PH] (ref 5–8)
PHOSPHATE SERPL-MCNC: 2.3 MG/DL (ref 2.7–4.5)
PHOSPHATE SERPL-MCNC: 2.9 MG/DL (ref 2.7–4.5)
PHOSPHATE SERPL-MCNC: 3.2 MG/DL (ref 2.7–4.5)
PHOSPHATE SERPL-MCNC: 3.2 MG/DL (ref 2.7–4.5)
PHOSPHATE SERPL-MCNC: 3.3 MG/DL (ref 2.7–4.5)
PHOSPHATE SERPL-MCNC: 3.3 MG/DL (ref 2.7–4.5)
PHOSPHATE SERPL-MCNC: 3.4 MG/DL (ref 2.7–4.5)
PHOSPHATE SERPL-MCNC: 3.5 MG/DL (ref 2.7–4.5)
PHOSPHATE SERPL-MCNC: 3.6 MG/DL (ref 2.7–4.5)
PHOSPHATE SERPL-MCNC: 3.7 MG/DL (ref 2.7–4.5)
PHOSPHATE SERPL-MCNC: 3.7 MG/DL (ref 2.7–4.5)
PHOSPHATE SERPL-MCNC: 3.9 MG/DL (ref 2.7–4.5)
PHOSPHATE SERPL-MCNC: 3.9 MG/DL (ref 2.7–4.5)
PHOSPHATE SERPL-MCNC: 4 MG/DL (ref 2.7–4.5)
PHOSPHATE SERPL-MCNC: 4.1 MG/DL (ref 2.7–4.5)
PHOSPHATE SERPL-MCNC: 4.2 MG/DL (ref 2.7–4.5)
PHOSPHATE SERPL-MCNC: 4.3 MG/DL (ref 2.7–4.5)
PHOSPHATE SERPL-MCNC: 4.4 MG/DL (ref 2.7–4.5)
PHOSPHATE SERPL-MCNC: 4.6 MG/DL (ref 2.7–4.5)
PHOSPHATE SERPL-MCNC: 4.6 MG/DL (ref 2.7–4.5)
PHOSPHATE SERPL-MCNC: 4.9 MG/DL (ref 2.7–4.5)
PHOSPHATE SERPL-MCNC: 4.9 MG/DL (ref 2.7–4.5)
PHOSPHATE SERPL-MCNC: 5 MG/DL (ref 2.7–4.5)
PIP: 14
PLATELET # BLD AUTO: 105 K/UL (ref 150–350)
PLATELET # BLD AUTO: 106 K/UL (ref 150–350)
PLATELET # BLD AUTO: 113 K/UL (ref 150–350)
PLATELET # BLD AUTO: 122 K/UL (ref 150–350)
PLATELET # BLD AUTO: 124 K/UL (ref 150–350)
PLATELET # BLD AUTO: 125 K/UL (ref 150–350)
PLATELET # BLD AUTO: 126 K/UL (ref 150–350)
PLATELET # BLD AUTO: 128 K/UL (ref 150–350)
PLATELET # BLD AUTO: 128 K/UL (ref 150–350)
PLATELET # BLD AUTO: 129 K/UL (ref 150–350)
PLATELET # BLD AUTO: 143 K/UL (ref 150–350)
PLATELET # BLD AUTO: 181 K/UL (ref 150–350)
PLATELET # BLD AUTO: 194 K/UL (ref 150–350)
PLATELET # BLD AUTO: 197 K/UL (ref 150–350)
PLATELET # BLD AUTO: 206 K/UL (ref 150–350)
PLATELET # BLD AUTO: 212 K/UL (ref 150–350)
PLATELET # BLD AUTO: 88 K/UL (ref 150–350)
PLATELET # BLD AUTO: 90 K/UL (ref 150–350)
PLATELET # BLD AUTO: 92 K/UL (ref 150–350)
PLATELET # BLD AUTO: 93 K/UL (ref 150–350)
PLATELET # BLD AUTO: 95 K/UL (ref 150–350)
PLATELET # BLD AUTO: 95 K/UL (ref 150–350)
PLATELET BLD QL SMEAR: ABNORMAL
PLATELET BLD QL SMEAR: ABNORMAL
PMV BLD AUTO: 10.1 FL (ref 9.2–12.9)
PMV BLD AUTO: 10.2 FL (ref 9.2–12.9)
PMV BLD AUTO: 10.2 FL (ref 9.2–12.9)
PMV BLD AUTO: 10.3 FL (ref 9.2–12.9)
PMV BLD AUTO: 10.4 FL (ref 9.2–12.9)
PMV BLD AUTO: 10.5 FL (ref 9.2–12.9)
PMV BLD AUTO: 10.5 FL (ref 9.2–12.9)
PMV BLD AUTO: 10.7 FL (ref 9.2–12.9)
PMV BLD AUTO: 10.8 FL (ref 9.2–12.9)
PMV BLD AUTO: 11 FL (ref 9.2–12.9)
PMV BLD AUTO: 11 FL (ref 9.2–12.9)
PMV BLD AUTO: 11.3 FL (ref 9.2–12.9)
PMV BLD AUTO: 11.7 FL (ref 9.2–12.9)
PMV BLD AUTO: 9.7 FL (ref 9.2–12.9)
PMV BLD AUTO: 9.8 FL (ref 9.2–12.9)
PMV BLD AUTO: 9.9 FL (ref 9.2–12.9)
PMV BLD AUTO: 9.9 FL (ref 9.2–12.9)
PO2 BLDA: 114 MMHG (ref 80–100)
PO2 BLDA: 125 MMHG (ref 80–100)
PO2 BLDA: 167 MMHG (ref 80–100)
PO2 BLDA: 22 MMHG (ref 40–60)
PO2 BLDA: 253 MMHG (ref 80–100)
PO2 BLDA: 83 MMHG (ref 80–100)
POC ACTIVATED CLOTTING TIME K: 142 SEC (ref 74–137)
POC ACTIVATED CLOTTING TIME K: 241 SEC (ref 74–137)
POC ACTIVATED CLOTTING TIME K: 263 SEC (ref 74–137)
POC BE: 0 MMOL/L
POC BE: 2 MMOL/L
POC BE: 2 MMOL/L
POC BE: 6 MMOL/L
POC BE: 6 MMOL/L
POC BE: 7 MMOL/L
POC IONIZED CALCIUM: 1.08 MMOL/L (ref 1.06–1.42)
POC IONIZED CALCIUM: 1.2 MMOL/L (ref 1.06–1.42)
POC SATURATED O2: 100 % (ref 95–100)
POC SATURATED O2: 100 % (ref 95–100)
POC SATURATED O2: 37 % (ref 95–100)
POC SATURATED O2: 97 % (ref 95–100)
POC SATURATED O2: 99 % (ref 95–100)
POC SATURATED O2: 99 % (ref 95–100)
POC TCO2: 25 MMOL/L (ref 23–27)
POC TCO2: 26 MMOL/L (ref 23–27)
POC TCO2: 28 MMOL/L (ref 23–27)
POC TCO2: 31 MMOL/L (ref 23–27)
POC TCO2: 32 MMOL/L (ref 23–27)
POC TCO2: 32 MMOL/L (ref 24–29)
POCT GLUCOSE: 100 MG/DL (ref 70–110)
POCT GLUCOSE: 101 MG/DL (ref 70–110)
POCT GLUCOSE: 101 MG/DL (ref 70–110)
POCT GLUCOSE: 105 MG/DL (ref 70–110)
POCT GLUCOSE: 107 MG/DL (ref 70–110)
POCT GLUCOSE: 108 MG/DL (ref 70–110)
POCT GLUCOSE: 108 MG/DL (ref 70–110)
POCT GLUCOSE: 109 MG/DL (ref 70–110)
POCT GLUCOSE: 110 MG/DL (ref 70–110)
POCT GLUCOSE: 113 MG/DL (ref 70–110)
POCT GLUCOSE: 122 MG/DL (ref 70–110)
POCT GLUCOSE: 123 MG/DL (ref 70–110)
POCT GLUCOSE: 124 MG/DL (ref 70–110)
POCT GLUCOSE: 125 MG/DL (ref 70–110)
POCT GLUCOSE: 132 MG/DL (ref 70–110)
POCT GLUCOSE: 155 MG/DL (ref 70–110)
POCT GLUCOSE: 184 MG/DL (ref 70–110)
POCT GLUCOSE: 61 MG/DL (ref 70–110)
POCT GLUCOSE: 62 MG/DL (ref 70–110)
POCT GLUCOSE: 62 MG/DL (ref 70–110)
POCT GLUCOSE: 64 MG/DL (ref 70–110)
POCT GLUCOSE: 75 MG/DL (ref 70–110)
POCT GLUCOSE: 75 MG/DL (ref 70–110)
POCT GLUCOSE: 77 MG/DL (ref 70–110)
POCT GLUCOSE: 78 MG/DL (ref 70–110)
POCT GLUCOSE: 80 MG/DL (ref 70–110)
POCT GLUCOSE: 80 MG/DL (ref 70–110)
POCT GLUCOSE: 81 MG/DL (ref 70–110)
POCT GLUCOSE: 81 MG/DL (ref 70–110)
POCT GLUCOSE: 82 MG/DL (ref 70–110)
POCT GLUCOSE: 82 MG/DL (ref 70–110)
POCT GLUCOSE: 84 MG/DL (ref 70–110)
POCT GLUCOSE: 84 MG/DL (ref 70–110)
POCT GLUCOSE: 85 MG/DL (ref 70–110)
POCT GLUCOSE: 86 MG/DL (ref 70–110)
POCT GLUCOSE: 88 MG/DL (ref 70–110)
POCT GLUCOSE: 91 MG/DL (ref 70–110)
POCT GLUCOSE: 91 MG/DL (ref 70–110)
POCT GLUCOSE: 92 MG/DL (ref 70–110)
POCT GLUCOSE: 93 MG/DL (ref 70–110)
POCT GLUCOSE: 93 MG/DL (ref 70–110)
POCT GLUCOSE: 94 MG/DL (ref 70–110)
POCT GLUCOSE: 94 MG/DL (ref 70–110)
POCT GLUCOSE: 95 MG/DL (ref 70–110)
POCT GLUCOSE: 98 MG/DL (ref 70–110)
POCT GLUCOSE: 99 MG/DL (ref 70–110)
POCT GLUCOSE: 99 MG/DL (ref 70–110)
POCT GLUCOSE: <20 MG/DL (ref 70–110)
POIKILOCYTOSIS BLD QL SMEAR: SLIGHT
POLYCHROMASIA BLD QL SMEAR: ABNORMAL
POTASSIUM BLD-SCNC: 3.6 MMOL/L (ref 3.5–5.1)
POTASSIUM BLD-SCNC: 4.3 MMOL/L (ref 3.5–5.1)
POTASSIUM SERPL-SCNC: 3.2 MMOL/L (ref 3.5–5.1)
POTASSIUM SERPL-SCNC: 3.5 MMOL/L (ref 3.5–5.1)
POTASSIUM SERPL-SCNC: 3.6 MMOL/L (ref 3.5–5.1)
POTASSIUM SERPL-SCNC: 3.6 MMOL/L (ref 3.5–5.1)
POTASSIUM SERPL-SCNC: 3.8 MMOL/L (ref 3.5–5.1)
POTASSIUM SERPL-SCNC: 3.9 MMOL/L (ref 3.5–5.1)
POTASSIUM SERPL-SCNC: 3.9 MMOL/L (ref 3.5–5.1)
POTASSIUM SERPL-SCNC: 4 MMOL/L (ref 3.5–5.1)
POTASSIUM SERPL-SCNC: 4.1 MMOL/L (ref 3.5–5.1)
POTASSIUM SERPL-SCNC: 4.2 MMOL/L (ref 3.5–5.1)
POTASSIUM SERPL-SCNC: 4.2 MMOL/L (ref 3.5–5.1)
POTASSIUM SERPL-SCNC: 4.3 MMOL/L (ref 3.5–5.1)
POTASSIUM SERPL-SCNC: 4.5 MMOL/L (ref 3.5–5.1)
POTASSIUM SERPL-SCNC: 4.6 MMOL/L (ref 3.5–5.1)
POTASSIUM SERPL-SCNC: 4.6 MMOL/L (ref 3.5–5.1)
POTASSIUM SERPL-SCNC: 4.7 MMOL/L (ref 3.5–5.1)
POTASSIUM SERPL-SCNC: 4.9 MMOL/L (ref 3.5–5.1)
PROT SERPL-MCNC: 5.5 G/DL (ref 6–8.4)
PROT SERPL-MCNC: 5.7 G/DL (ref 6–8.4)
PROT SERPL-MCNC: 5.9 G/DL (ref 6–8.4)
PROT SERPL-MCNC: 6 G/DL (ref 6–8.4)
PROT SERPL-MCNC: 6.1 G/DL (ref 6–8.4)
PROT SERPL-MCNC: 6.2 G/DL (ref 6–8.4)
PROT SERPL-MCNC: 6.3 G/DL (ref 6–8.4)
PROT SERPL-MCNC: 6.4 G/DL (ref 6–8.4)
PROT SERPL-MCNC: 6.6 G/DL (ref 6–8.4)
PROT SERPL-MCNC: 6.6 G/DL (ref 6–8.4)
PROT SERPL-MCNC: 6.7 G/DL (ref 6–8.4)
PROT SERPL-MCNC: 6.9 G/DL (ref 6–8.4)
PROT SERPL-MCNC: 7 G/DL (ref 6–8.4)
PROT SERPL-MCNC: 7.1 G/DL (ref 6–8.4)
PROT SERPL-MCNC: 7.1 G/DL (ref 6–8.4)
PROT SERPL-MCNC: 7.2 G/DL (ref 6–8.4)
PROT UR QL STRIP: ABNORMAL
PROTHROMBIN TIME: 11 SEC (ref 9–12.5)
PROTHROMBIN TIME: 11.5 SEC (ref 9–12.5)
PROTHROMBIN TIME: 11.8 SEC (ref 9–12.5)
RA MAJOR: 3.45 CM
RA MAJOR: 5.72 CM
RA WIDTH: 3.28 CM
RBC # BLD AUTO: 3.66 M/UL (ref 4.6–6.2)
RBC # BLD AUTO: 3.68 M/UL (ref 4.6–6.2)
RBC # BLD AUTO: 3.83 M/UL (ref 4.6–6.2)
RBC # BLD AUTO: 3.95 M/UL (ref 4.6–6.2)
RBC # BLD AUTO: 4.04 M/UL (ref 4.6–6.2)
RBC # BLD AUTO: 4.06 M/UL (ref 4.6–6.2)
RBC # BLD AUTO: 4.1 M/UL (ref 4.6–6.2)
RBC # BLD AUTO: 4.15 M/UL (ref 4.6–6.2)
RBC # BLD AUTO: 4.26 M/UL (ref 4.6–6.2)
RBC # BLD AUTO: 4.26 M/UL (ref 4.6–6.2)
RBC # BLD AUTO: 4.36 M/UL (ref 4.6–6.2)
RBC # BLD AUTO: 4.41 M/UL (ref 4.6–6.2)
RBC # BLD AUTO: 4.41 M/UL (ref 4.6–6.2)
RBC # BLD AUTO: 4.46 M/UL (ref 4.6–6.2)
RBC # BLD AUTO: 4.62 M/UL (ref 4.6–6.2)
RBC # BLD AUTO: 4.67 M/UL (ref 4.6–6.2)
RBC # BLD AUTO: 4.76 M/UL (ref 4.6–6.2)
RBC # BLD AUTO: 4.87 M/UL (ref 4.6–6.2)
RBC # BLD AUTO: 5.26 M/UL (ref 4.6–6.2)
RBC # BLD AUTO: 5.35 M/UL (ref 4.6–6.2)
RBC #/AREA URNS AUTO: 8 /HPF (ref 0–4)
RIGHT VENTRICULAR END-DIASTOLIC DIMENSION: 2.62 CM
SAMPLE: ABNORMAL
SAMPLE: NORMAL
SINUS: 3.18 CM
SINUS: 3.73 CM
SITE: ABNORMAL
SITE: NORMAL
SODIUM BLD-SCNC: 135 MMOL/L (ref 136–145)
SODIUM BLD-SCNC: 141 MMOL/L (ref 136–145)
SODIUM SERPL-SCNC: 128 MMOL/L (ref 136–145)
SODIUM SERPL-SCNC: 131 MMOL/L (ref 136–145)
SODIUM SERPL-SCNC: 131 MMOL/L (ref 136–145)
SODIUM SERPL-SCNC: 132 MMOL/L (ref 136–145)
SODIUM SERPL-SCNC: 132 MMOL/L (ref 136–145)
SODIUM SERPL-SCNC: 133 MMOL/L (ref 136–145)
SODIUM SERPL-SCNC: 133 MMOL/L (ref 136–145)
SODIUM SERPL-SCNC: 134 MMOL/L (ref 136–145)
SODIUM SERPL-SCNC: 135 MMOL/L (ref 136–145)
SODIUM SERPL-SCNC: 136 MMOL/L (ref 136–145)
SODIUM SERPL-SCNC: 137 MMOL/L (ref 136–145)
SODIUM SERPL-SCNC: 137 MMOL/L (ref 136–145)
SODIUM SERPL-SCNC: 138 MMOL/L (ref 136–145)
SODIUM SERPL-SCNC: 138 MMOL/L (ref 136–145)
SODIUM SERPL-SCNC: 139 MMOL/L (ref 136–145)
SODIUM SERPL-SCNC: 139 MMOL/L (ref 136–145)
SODIUM SERPL-SCNC: 141 MMOL/L (ref 136–145)
SODIUM SERPL-SCNC: 141 MMOL/L (ref 136–145)
SP GR UR STRIP: >1.03 (ref 1–1.03)
SP02: 100
SP02: 97
SP02: 97
SP02: 99
SP02: 995
STJ: 3.14 CM
STJ: 3.87 CM
TDI LATERAL: 0.12 M/S
TDI LATERAL: 0.17 M/S
TDI SEPTAL: 0.09 M/S
TDI SEPTAL: 0.15 M/S
TDI: 0.13 M/S
TDI: 0.14 M/S
TRANS ERYTHROCYTES VOL PATIENT: NORMAL ML
TRANS ERYTHROCYTES VOL PATIENT: NORMAL ML
TRICUSPID ANNULAR PLANE SYSTOLIC EXCURSION: 1.33 CM
TRICUSPID ANNULAR PLANE SYSTOLIC EXCURSION: 1.88 CM
TROPONIN I SERPL DL<=0.01 NG/ML-MCNC: 0.02 NG/ML (ref 0–0.03)
TROPONIN I SERPL DL<=0.01 NG/ML-MCNC: 0.03 NG/ML (ref 0–0.03)
TROPONIN I SERPL DL<=0.01 NG/ML-MCNC: 0.06 NG/ML (ref 0–0.03)
TROPONIN I SERPL DL<=0.01 NG/ML-MCNC: 0.09 NG/ML (ref 0–0.03)
URN SPEC COLLECT METH UR: ABNORMAL
VT: 380
WBC # BLD AUTO: 15.01 K/UL (ref 3.9–12.7)
WBC # BLD AUTO: 15.85 K/UL (ref 3.9–12.7)
WBC # BLD AUTO: 16.14 K/UL (ref 3.9–12.7)
WBC # BLD AUTO: 16.43 K/UL (ref 3.9–12.7)
WBC # BLD AUTO: 17.49 K/UL (ref 3.9–12.7)
WBC # BLD AUTO: 4.13 K/UL (ref 3.9–12.7)
WBC # BLD AUTO: 4.22 K/UL (ref 3.9–12.7)
WBC # BLD AUTO: 5.03 K/UL (ref 3.9–12.7)
WBC # BLD AUTO: 5.3 K/UL (ref 3.9–12.7)
WBC # BLD AUTO: 5.64 K/UL (ref 3.9–12.7)
WBC # BLD AUTO: 5.73 K/UL (ref 3.9–12.7)
WBC # BLD AUTO: 5.82 K/UL (ref 3.9–12.7)
WBC # BLD AUTO: 6.22 K/UL (ref 3.9–12.7)
WBC # BLD AUTO: 6.24 K/UL (ref 3.9–12.7)
WBC # BLD AUTO: 6.44 K/UL (ref 3.9–12.7)
WBC # BLD AUTO: 6.68 K/UL (ref 3.9–12.7)
WBC # BLD AUTO: 8.59 K/UL (ref 3.9–12.7)
WBC # BLD AUTO: 8.95 K/UL (ref 3.9–12.7)
WBC # BLD AUTO: 9.35 K/UL (ref 3.9–12.7)
WBC # BLD AUTO: 9.44 K/UL (ref 3.9–12.7)
WBC #/AREA URNS AUTO: 8 /HPF (ref 0–5)

## 2019-01-01 PROCEDURE — 97161 PT EVAL LOW COMPLEX 20 MIN: CPT

## 2019-01-01 PROCEDURE — 63600175 PHARM REV CODE 636 W HCPCS: Performed by: STUDENT IN AN ORGANIZED HEALTH CARE EDUCATION/TRAINING PROGRAM

## 2019-01-01 PROCEDURE — 82378 CARCINOEMBRYONIC ANTIGEN: CPT

## 2019-01-01 PROCEDURE — 25000003 PHARM REV CODE 250: Performed by: STUDENT IN AN ORGANIZED HEALTH CARE EDUCATION/TRAINING PROGRAM

## 2019-01-01 PROCEDURE — 83735 ASSAY OF MAGNESIUM: CPT

## 2019-01-01 PROCEDURE — 36415 COLL VENOUS BLD VENIPUNCTURE: CPT

## 2019-01-01 PROCEDURE — 99232 PR SUBSEQUENT HOSPITAL CARE,LEVL II: ICD-10-PCS | Mod: ,,, | Performed by: INTERNAL MEDICINE

## 2019-01-01 PROCEDURE — 99900035 HC TECH TIME PER 15 MIN (STAT)

## 2019-01-01 PROCEDURE — 84100 ASSAY OF PHOSPHORUS: CPT

## 2019-01-01 PROCEDURE — 63600175 PHARM REV CODE 636 W HCPCS: Performed by: HOSPITALIST

## 2019-01-01 PROCEDURE — 25000242 PHARM REV CODE 250 ALT 637 W/ HCPCS: Performed by: STUDENT IN AN ORGANIZED HEALTH CARE EDUCATION/TRAINING PROGRAM

## 2019-01-01 PROCEDURE — 80053 COMPREHEN METABOLIC PANEL: CPT | Mod: 91

## 2019-01-01 PROCEDURE — 87205 SMEAR GRAM STAIN: CPT

## 2019-01-01 PROCEDURE — 85025 COMPLETE CBC W/AUTO DIFF WBC: CPT

## 2019-01-01 PROCEDURE — 20000000 HC ICU ROOM

## 2019-01-01 PROCEDURE — 34709 PLMT XTN PROSTH EVASC RPR: CPT | Mod: RT,,, | Performed by: SURGERY

## 2019-01-01 PROCEDURE — 34705 EVAC RPR A-BIILIAC NDGFT: CPT | Mod: ,,, | Performed by: SURGERY

## 2019-01-01 PROCEDURE — 86922 COMPATIBILITY TEST ANTIGLOB: CPT

## 2019-01-01 PROCEDURE — C1894 INTRO/SHEATH, NON-LASER: HCPCS | Performed by: SURGERY

## 2019-01-01 PROCEDURE — 25000242 PHARM REV CODE 250 ALT 637 W/ HCPCS: Performed by: HOSPITALIST

## 2019-01-01 PROCEDURE — 80053 COMPREHEN METABOLIC PANEL: CPT

## 2019-01-01 PROCEDURE — 30200315 PPD INTRADERMAL TEST REV CODE 302: Performed by: STUDENT IN AN ORGANIZED HEALTH CARE EDUCATION/TRAINING PROGRAM

## 2019-01-01 PROCEDURE — 93880 EXTRACRANIAL BILAT STUDY: CPT | Performed by: SURGERY

## 2019-01-01 PROCEDURE — 25000003 PHARM REV CODE 250: Performed by: HOSPITALIST

## 2019-01-01 PROCEDURE — 27000221 HC OXYGEN, UP TO 24 HOURS

## 2019-01-01 PROCEDURE — 99223 1ST HOSP IP/OBS HIGH 75: CPT | Mod: ,,, | Performed by: HOSPITALIST

## 2019-01-01 PROCEDURE — D9220A PRA ANESTHESIA: ICD-10-PCS | Mod: CRNA,,, | Performed by: NURSE ANESTHETIST, CERTIFIED REGISTERED

## 2019-01-01 PROCEDURE — 99223 PR INITIAL HOSPITAL CARE,LEVL III: ICD-10-PCS | Mod: ,,, | Performed by: HOSPITALIST

## 2019-01-01 PROCEDURE — 99233 PR SUBSEQUENT HOSPITAL CARE,LEVL III: ICD-10-PCS | Mod: ,,, | Performed by: INTERNAL MEDICINE

## 2019-01-01 PROCEDURE — D9220A PRA ANESTHESIA: Mod: CRNA,,, | Performed by: NURSE ANESTHETIST, CERTIFIED REGISTERED

## 2019-01-01 PROCEDURE — 85730 THROMBOPLASTIN TIME PARTIAL: CPT | Mod: 91

## 2019-01-01 PROCEDURE — 63600175 PHARM REV CODE 636 W HCPCS: Performed by: SURGERY

## 2019-01-01 PROCEDURE — 99285 PR EMERGENCY DEPT VISIT,LEVEL V: ICD-10-PCS | Mod: ,,, | Performed by: EMERGENCY MEDICINE

## 2019-01-01 PROCEDURE — 82803 BLOOD GASES ANY COMBINATION: CPT

## 2019-01-01 PROCEDURE — 83735 ASSAY OF MAGNESIUM: CPT | Mod: 91

## 2019-01-01 PROCEDURE — 94003 VENT MGMT INPAT SUBQ DAY: CPT

## 2019-01-01 PROCEDURE — 84100 ASSAY OF PHOSPHORUS: CPT | Mod: 91

## 2019-01-01 PROCEDURE — 99232 SBSQ HOSP IP/OBS MODERATE 35: CPT | Mod: ,,, | Performed by: INTERNAL MEDICINE

## 2019-01-01 PROCEDURE — 94761 N-INVAS EAR/PLS OXIMETRY MLT: CPT

## 2019-01-01 PROCEDURE — 99233 PR SUBSEQUENT HOSPITAL CARE,LEVL III: ICD-10-PCS | Mod: ,,, | Performed by: SURGERY

## 2019-01-01 PROCEDURE — 99232 SBSQ HOSP IP/OBS MODERATE 35: CPT | Mod: ,,, | Performed by: HOSPITALIST

## 2019-01-01 PROCEDURE — 11000001 HC ACUTE MED/SURG PRIVATE ROOM

## 2019-01-01 PROCEDURE — 34713 PERQ ACCESS & CLSR FEM ART: CPT | Mod: RT,,, | Performed by: SURGERY

## 2019-01-01 PROCEDURE — S0028 INJECTION, FAMOTIDINE, 20 MG: HCPCS | Performed by: SURGERY

## 2019-01-01 PROCEDURE — 99233 SBSQ HOSP IP/OBS HIGH 50: CPT | Mod: ,,, | Performed by: INTERNAL MEDICINE

## 2019-01-01 PROCEDURE — 85730 THROMBOPLASTIN TIME PARTIAL: CPT

## 2019-01-01 PROCEDURE — 63600175 PHARM REV CODE 636 W HCPCS: Mod: JG

## 2019-01-01 PROCEDURE — 85025 COMPLETE CBC W/AUTO DIFF WBC: CPT | Mod: 91

## 2019-01-01 PROCEDURE — 37000009 HC ANESTHESIA EA ADD 15 MINS: Performed by: SURGERY

## 2019-01-01 PROCEDURE — 87071 CULTURE AEROBIC QUANT OTHER: CPT

## 2019-01-01 PROCEDURE — 99233 PR SUBSEQUENT HOSPITAL CARE,LEVL III: ICD-10-PCS | Mod: ,,, | Performed by: ANESTHESIOLOGY

## 2019-01-01 PROCEDURE — S4991 NICOTINE PATCH NONLEGEND: HCPCS | Performed by: STUDENT IN AN ORGANIZED HEALTH CARE EDUCATION/TRAINING PROGRAM

## 2019-01-01 PROCEDURE — 25000003 PHARM REV CODE 250: Performed by: INTERNAL MEDICINE

## 2019-01-01 PROCEDURE — 99285 EMERGENCY DEPT VISIT HI MDM: CPT | Mod: ,,, | Performed by: EMERGENCY MEDICINE

## 2019-01-01 PROCEDURE — 85610 PROTHROMBIN TIME: CPT

## 2019-01-01 PROCEDURE — 93005 ELECTROCARDIOGRAM TRACING: CPT

## 2019-01-01 PROCEDURE — C1769 GUIDE WIRE: HCPCS | Performed by: SURGERY

## 2019-01-01 PROCEDURE — 99232 PR SUBSEQUENT HOSPITAL CARE,LEVL II: ICD-10-PCS | Mod: ,,, | Performed by: HOSPITALIST

## 2019-01-01 PROCEDURE — 99232 PR SUBSEQUENT HOSPITAL CARE,LEVL II: ICD-10-PCS | Mod: 25,,, | Performed by: INTERNAL MEDICINE

## 2019-01-01 PROCEDURE — P9045 ALBUMIN (HUMAN), 5%, 250 ML: HCPCS | Mod: JG

## 2019-01-01 PROCEDURE — 85014 HEMATOCRIT: CPT

## 2019-01-01 PROCEDURE — D9220A PRA ANESTHESIA: ICD-10-PCS | Mod: ANES,,, | Performed by: ANESTHESIOLOGY

## 2019-01-01 PROCEDURE — 99233 PR SUBSEQUENT HOSPITAL CARE,LEVL III: ICD-10-PCS | Mod: 25,,, | Performed by: INTERNAL MEDICINE

## 2019-01-01 PROCEDURE — 99233 SBSQ HOSP IP/OBS HIGH 50: CPT | Mod: ,,, | Performed by: ANESTHESIOLOGY

## 2019-01-01 PROCEDURE — 84484 ASSAY OF TROPONIN QUANT: CPT | Mod: 91

## 2019-01-01 PROCEDURE — 99233 SBSQ HOSP IP/OBS HIGH 50: CPT | Mod: ,,, | Performed by: SURGERY

## 2019-01-01 PROCEDURE — 84484 ASSAY OF TROPONIN QUANT: CPT

## 2019-01-01 PROCEDURE — 63600175 PHARM REV CODE 636 W HCPCS

## 2019-01-01 PROCEDURE — 99406 PT REFUSED TOBACCO CESSATION: ICD-10-PCS | Mod: S$GLB,,, | Performed by: INTERNAL MEDICINE

## 2019-01-01 PROCEDURE — 99223 1ST HOSP IP/OBS HIGH 75: CPT | Mod: 57,,, | Performed by: SURGERY

## 2019-01-01 PROCEDURE — 92610 EVALUATE SWALLOWING FUNCTION: CPT

## 2019-01-01 PROCEDURE — 83880 ASSAY OF NATRIURETIC PEPTIDE: CPT

## 2019-01-01 PROCEDURE — 83605 ASSAY OF LACTIC ACID: CPT

## 2019-01-01 PROCEDURE — 99900026 HC AIRWAY MAINTENANCE (STAT)

## 2019-01-01 PROCEDURE — 99406 BEHAV CHNG SMOKING 3-10 MIN: CPT | Mod: S$GLB,,, | Performed by: INTERNAL MEDICINE

## 2019-01-01 PROCEDURE — C1725 CATH, TRANSLUMIN NON-LASER: HCPCS | Performed by: SURGERY

## 2019-01-01 PROCEDURE — S0028 INJECTION, FAMOTIDINE, 20 MG: HCPCS | Performed by: STUDENT IN AN ORGANIZED HEALTH CARE EDUCATION/TRAINING PROGRAM

## 2019-01-01 PROCEDURE — 37799 UNLISTED PX VASCULAR SURGERY: CPT

## 2019-01-01 PROCEDURE — 34705 PR REPAIR, ENDOVASC, AORTO-BI-ILIAC ENDOGRAFT: ICD-10-PCS | Mod: ,,, | Performed by: SURGERY

## 2019-01-01 PROCEDURE — 80048 BASIC METABOLIC PNL TOTAL CA: CPT

## 2019-01-01 PROCEDURE — 25000003 PHARM REV CODE 250: Performed by: SURGERY

## 2019-01-01 PROCEDURE — 97530 THERAPEUTIC ACTIVITIES: CPT

## 2019-01-01 PROCEDURE — 34713 PR ACCESS/CLOSURE, FEM ART, DLVR OF ENDOGRAFT, PERC: ICD-10-PCS | Mod: RT,,, | Performed by: SURGERY

## 2019-01-01 PROCEDURE — 25000003 PHARM REV CODE 250: Performed by: EMERGENCY MEDICINE

## 2019-01-01 PROCEDURE — 97165 OT EVAL LOW COMPLEX 30 MIN: CPT

## 2019-01-01 PROCEDURE — 99284 EMERGENCY DEPT VISIT MOD MDM: CPT | Mod: ,,, | Performed by: EMERGENCY MEDICINE

## 2019-01-01 PROCEDURE — 94640 AIRWAY INHALATION TREATMENT: CPT

## 2019-01-01 PROCEDURE — 96376 TX/PRO/DX INJ SAME DRUG ADON: CPT

## 2019-01-01 PROCEDURE — 93010 EKG 12-LEAD: ICD-10-PCS | Mod: ,,, | Performed by: INTERNAL MEDICINE

## 2019-01-01 PROCEDURE — C1874 STENT, COATED/COV W/DEL SYS: HCPCS | Performed by: SURGERY

## 2019-01-01 PROCEDURE — 92526 ORAL FUNCTION THERAPY: CPT

## 2019-01-01 PROCEDURE — 99284 PR EMERGENCY DEPT VISIT,LEVEL IV: ICD-10-PCS | Mod: ,,, | Performed by: EMERGENCY MEDICINE

## 2019-01-01 PROCEDURE — 36556 INSERT NON-TUNNEL CV CATH: CPT | Mod: ,,, | Performed by: SURGERY

## 2019-01-01 PROCEDURE — 94002 VENT MGMT INPAT INIT DAY: CPT

## 2019-01-01 PROCEDURE — 96375 TX/PRO/DX INJ NEW DRUG ADDON: CPT

## 2019-01-01 PROCEDURE — 25000003 PHARM REV CODE 250

## 2019-01-01 PROCEDURE — 94799 UNLISTED PULMONARY SVC/PX: CPT

## 2019-01-01 PROCEDURE — 99223 PR INITIAL HOSPITAL CARE,LEVL III: ICD-10-PCS | Mod: 57,,, | Performed by: SURGERY

## 2019-01-01 PROCEDURE — 99239 PR HOSPITAL DISCHARGE DAY,>30 MIN: ICD-10-PCS | Mod: ,,, | Performed by: HOSPITALIST

## 2019-01-01 PROCEDURE — 36000706: Performed by: SURGERY

## 2019-01-01 PROCEDURE — 99285 EMERGENCY DEPT VISIT HI MDM: CPT | Mod: 25

## 2019-01-01 PROCEDURE — 87077 CULTURE AEROBIC IDENTIFY: CPT

## 2019-01-01 PROCEDURE — D9220A PRA ANESTHESIA: Mod: ANES,,, | Performed by: ANESTHESIOLOGY

## 2019-01-01 PROCEDURE — 87040 BLOOD CULTURE FOR BACTERIA: CPT

## 2019-01-01 PROCEDURE — 86902 BLOOD TYPE ANTIGEN DONOR EA: CPT

## 2019-01-01 PROCEDURE — 99233 SBSQ HOSP IP/OBS HIGH 50: CPT | Mod: 25,,, | Performed by: INTERNAL MEDICINE

## 2019-01-01 PROCEDURE — 25000003 PHARM REV CODE 250: Performed by: NURSE ANESTHETIST, CERTIFIED REGISTERED

## 2019-01-01 PROCEDURE — 99239 HOSP IP/OBS DSCHRG MGMT >30: CPT | Mod: ,,, | Performed by: HOSPITALIST

## 2019-01-01 PROCEDURE — 85610 PROTHROMBIN TIME: CPT | Mod: 91

## 2019-01-01 PROCEDURE — 93010 ELECTROCARDIOGRAM REPORT: CPT | Mod: ,,, | Performed by: INTERNAL MEDICINE

## 2019-01-01 PROCEDURE — 63600175 PHARM REV CODE 636 W HCPCS: Performed by: NURSE ANESTHETIST, CERTIFIED REGISTERED

## 2019-01-01 PROCEDURE — C1887 CATHETER, GUIDING: HCPCS | Performed by: SURGERY

## 2019-01-01 PROCEDURE — 84132 ASSAY OF SERUM POTASSIUM: CPT

## 2019-01-01 PROCEDURE — 27201423 OPTIME MED/SURG SUP & DEVICES STERILE SUPPLY: Performed by: SURGERY

## 2019-01-01 PROCEDURE — 99283 PR EMERGENCY DEPT VISIT,LEVEL III: ICD-10-PCS | Mod: ,,, | Performed by: SURGERY

## 2019-01-01 PROCEDURE — 93925 LOWER EXTREMITY STUDY: CPT | Performed by: SURGERY

## 2019-01-01 PROCEDURE — 93922 UPR/L XTREMITY ART 2 LEVELS: CPT | Performed by: SURGERY

## 2019-01-01 PROCEDURE — C2628 CATHETER, OCCLUSION: HCPCS | Performed by: SURGERY

## 2019-01-01 PROCEDURE — 81001 URINALYSIS AUTO W/SCOPE: CPT

## 2019-01-01 PROCEDURE — 34709 PR PLACEMENT, EXTN PROSTHESIS, ENDOVASC REPAIR: ICD-10-PCS | Mod: RT,,, | Performed by: SURGERY

## 2019-01-01 PROCEDURE — 96374 THER/PROPH/DIAG INJ IV PUSH: CPT

## 2019-01-01 PROCEDURE — 94150 VITAL CAPACITY TEST: CPT

## 2019-01-01 PROCEDURE — 83880 ASSAY OF NATRIURETIC PEPTIDE: CPT | Mod: 91

## 2019-01-01 PROCEDURE — 86870 RBC ANTIBODY IDENTIFICATION: CPT

## 2019-01-01 PROCEDURE — 36556 PR INSERT NON-TUNNEL CV CATH 5+ YRS OLD: ICD-10-PCS | Mod: ,,, | Performed by: SURGERY

## 2019-01-01 PROCEDURE — 97535 SELF CARE MNGMENT TRAINING: CPT

## 2019-01-01 PROCEDURE — 87185 SC STD ENZYME DETCJ PER NZM: CPT

## 2019-01-01 PROCEDURE — 99283 EMERGENCY DEPT VISIT LOW MDM: CPT | Mod: ,,, | Performed by: SURGERY

## 2019-01-01 PROCEDURE — 86580 TB INTRADERMAL TEST: CPT | Performed by: STUDENT IN AN ORGANIZED HEALTH CARE EDUCATION/TRAINING PROGRAM

## 2019-01-01 PROCEDURE — 82330 ASSAY OF CALCIUM: CPT

## 2019-01-01 PROCEDURE — 84295 ASSAY OF SERUM SODIUM: CPT

## 2019-01-01 PROCEDURE — 37000008 HC ANESTHESIA 1ST 15 MINUTES: Performed by: SURGERY

## 2019-01-01 PROCEDURE — 20600001 HC STEP DOWN PRIVATE ROOM

## 2019-01-01 PROCEDURE — C1760 CLOSURE DEV, VASC: HCPCS | Performed by: SURGERY

## 2019-01-01 PROCEDURE — 99232 SBSQ HOSP IP/OBS MODERATE 35: CPT | Mod: 25,,, | Performed by: INTERNAL MEDICINE

## 2019-01-01 PROCEDURE — 36000707: Performed by: SURGERY

## 2019-01-01 PROCEDURE — 86850 RBC ANTIBODY SCREEN: CPT

## 2019-01-01 RX ORDER — HYDRALAZINE HYDROCHLORIDE 20 MG/ML
10 INJECTION INTRAMUSCULAR; INTRAVENOUS ONCE
Status: COMPLETED | OUTPATIENT
Start: 2019-01-01 | End: 2019-01-01

## 2019-01-01 RX ORDER — IBUPROFEN 200 MG
1 TABLET ORAL DAILY
Status: DISCONTINUED | OUTPATIENT
Start: 2019-01-01 | End: 2019-01-01 | Stop reason: HOSPADM

## 2019-01-01 RX ORDER — PROPOFOL 10 MG/ML
VIAL (ML) INTRAVENOUS
Status: DISCONTINUED | OUTPATIENT
Start: 2019-01-01 | End: 2019-01-01

## 2019-01-01 RX ORDER — FAMOTIDINE 10 MG/ML
20 INJECTION INTRAVENOUS 2 TIMES DAILY
Status: DISCONTINUED | OUTPATIENT
Start: 2019-01-01 | End: 2019-01-01

## 2019-01-01 RX ORDER — AMOXICILLIN 250 MG
1 CAPSULE ORAL 2 TIMES DAILY PRN
Status: DISCONTINUED | OUTPATIENT
Start: 2019-01-01 | End: 2019-01-01 | Stop reason: HOSPADM

## 2019-01-01 RX ORDER — HALOPERIDOL 5 MG/ML
INJECTION INTRAMUSCULAR
Status: DISPENSED
Start: 2019-01-01 | End: 2019-01-01

## 2019-01-01 RX ORDER — DEXMEDETOMIDINE HYDROCHLORIDE 4 UG/ML
0.2 INJECTION, SOLUTION INTRAVENOUS CONTINUOUS
Status: DISCONTINUED | OUTPATIENT
Start: 2019-01-01 | End: 2019-01-01

## 2019-01-01 RX ORDER — TAMSULOSIN HYDROCHLORIDE 0.4 MG/1
0.4 CAPSULE ORAL DAILY
Status: DISCONTINUED | OUTPATIENT
Start: 2019-01-01 | End: 2019-01-01 | Stop reason: HOSPADM

## 2019-01-01 RX ORDER — HYDROCODONE BITARTRATE AND ACETAMINOPHEN 10; 325 MG/1; MG/1
1 TABLET ORAL EVERY 6 HOURS PRN
Status: DISCONTINUED | OUTPATIENT
Start: 2019-01-01 | End: 2019-01-01

## 2019-01-01 RX ORDER — FUROSEMIDE 10 MG/ML
20 INJECTION INTRAMUSCULAR; INTRAVENOUS ONCE
Status: COMPLETED | OUTPATIENT
Start: 2019-01-01 | End: 2019-01-01

## 2019-01-01 RX ORDER — MIDAZOLAM HYDROCHLORIDE 1 MG/ML
INJECTION, SOLUTION INTRAMUSCULAR; INTRAVENOUS
Status: DISCONTINUED | OUTPATIENT
Start: 2019-01-01 | End: 2019-01-01

## 2019-01-01 RX ORDER — CHLORDIAZEPOXIDE HYDROCHLORIDE 5 MG/1
15 CAPSULE, GELATIN COATED ORAL 4 TIMES DAILY
Status: DISPENSED | OUTPATIENT
Start: 2019-01-01 | End: 2019-01-01

## 2019-01-01 RX ORDER — ATORVASTATIN CALCIUM 20 MG/1
80 TABLET, FILM COATED ORAL DAILY
Status: DISCONTINUED | OUTPATIENT
Start: 2019-01-01 | End: 2019-01-01 | Stop reason: HOSPADM

## 2019-01-01 RX ORDER — HALOPERIDOL 5 MG/ML
5 INJECTION INTRAMUSCULAR ONCE
Status: COMPLETED | OUTPATIENT
Start: 2019-01-01 | End: 2019-01-01

## 2019-01-01 RX ORDER — FUROSEMIDE 10 MG/ML
40 INJECTION INTRAMUSCULAR; INTRAVENOUS 2 TIMES DAILY
Status: DISCONTINUED | OUTPATIENT
Start: 2019-01-01 | End: 2019-01-01

## 2019-01-01 RX ORDER — CEFEPIME HYDROCHLORIDE 1 G/1
1 INJECTION, POWDER, FOR SOLUTION INTRAMUSCULAR; INTRAVENOUS
Status: DISCONTINUED | OUTPATIENT
Start: 2019-01-01 | End: 2019-01-01 | Stop reason: ALTCHOICE

## 2019-01-01 RX ORDER — HEPARIN SODIUM 5000 [USP'U]/ML
5000 INJECTION, SOLUTION INTRAVENOUS; SUBCUTANEOUS EVERY 8 HOURS
Status: DISCONTINUED | OUTPATIENT
Start: 2019-01-01 | End: 2019-01-01 | Stop reason: HOSPADM

## 2019-01-01 RX ORDER — SENNOSIDES 8.6 MG/1
8.6 TABLET ORAL DAILY
Status: DISCONTINUED | OUTPATIENT
Start: 2019-01-01 | End: 2019-01-01 | Stop reason: HOSPADM

## 2019-01-01 RX ORDER — IBUPROFEN 200 MG
24 TABLET ORAL
Status: DISCONTINUED | OUTPATIENT
Start: 2019-01-01 | End: 2019-01-01 | Stop reason: HOSPADM

## 2019-01-01 RX ORDER — METOPROLOL TARTRATE 1 MG/ML
5 INJECTION, SOLUTION INTRAVENOUS ONCE
Status: COMPLETED | OUTPATIENT
Start: 2019-01-01 | End: 2019-01-01

## 2019-01-01 RX ORDER — LIDOCAINE HCL/PF 100 MG/5ML
SYRINGE (ML) INTRAVENOUS
Status: DISCONTINUED | OUTPATIENT
Start: 2019-01-01 | End: 2019-01-01

## 2019-01-01 RX ORDER — ROCURONIUM BROMIDE 10 MG/ML
INJECTION, SOLUTION INTRAVENOUS
Status: DISCONTINUED | OUTPATIENT
Start: 2019-01-01 | End: 2019-01-01

## 2019-01-01 RX ORDER — LABETALOL HCL 20 MG/4 ML
10 SYRINGE (ML) INTRAVENOUS ONCE
Status: COMPLETED | OUTPATIENT
Start: 2019-01-01 | End: 2019-01-01

## 2019-01-01 RX ORDER — POLYETHYLENE GLYCOL 3350 17 G/17G
17 POWDER, FOR SOLUTION ORAL DAILY
Status: DISCONTINUED | OUTPATIENT
Start: 2019-01-01 | End: 2019-01-01 | Stop reason: HOSPADM

## 2019-01-01 RX ORDER — LISINOPRIL 2.5 MG/1
2.5 TABLET ORAL DAILY
Qty: 30 TABLET | Refills: 1 | Status: SHIPPED | OUTPATIENT
Start: 2019-01-01

## 2019-01-01 RX ORDER — LORAZEPAM 2 MG/ML
2 INJECTION INTRAMUSCULAR ONCE
Status: DISCONTINUED | OUTPATIENT
Start: 2019-01-01 | End: 2019-01-01

## 2019-01-01 RX ORDER — CARVEDILOL 6.25 MG/1
6.25 TABLET ORAL 2 TIMES DAILY
Status: DISCONTINUED | OUTPATIENT
Start: 2019-01-01 | End: 2019-01-01 | Stop reason: HOSPADM

## 2019-01-01 RX ORDER — CHLORDIAZEPOXIDE HYDROCHLORIDE 5 MG/1
10 CAPSULE, GELATIN COATED ORAL 3 TIMES DAILY
Status: COMPLETED | OUTPATIENT
Start: 2019-01-01 | End: 2019-01-01

## 2019-01-01 RX ORDER — OLANZAPINE 10 MG/2ML
5 INJECTION, POWDER, FOR SOLUTION INTRAMUSCULAR ONCE
Status: DISCONTINUED | OUTPATIENT
Start: 2019-01-01 | End: 2019-01-01

## 2019-01-01 RX ORDER — HEPARIN SODIUM 5000 [USP'U]/ML
5000 INJECTION, SOLUTION INTRAVENOUS; SUBCUTANEOUS EVERY 8 HOURS
Status: DISCONTINUED | OUTPATIENT
Start: 2019-01-01 | End: 2019-01-01

## 2019-01-01 RX ORDER — LACTULOSE 10 G/15ML
30 SOLUTION ORAL EVERY 6 HOURS
Status: DISCONTINUED | OUTPATIENT
Start: 2019-01-01 | End: 2019-01-01

## 2019-01-01 RX ORDER — AMOXICILLIN AND CLAVULANATE POTASSIUM 875; 125 MG/1; MG/1
1 TABLET, FILM COATED ORAL EVERY 12 HOURS
Status: COMPLETED | OUTPATIENT
Start: 2019-01-01 | End: 2019-01-01

## 2019-01-01 RX ORDER — CHLORDIAZEPOXIDE HYDROCHLORIDE 5 MG/1
10 CAPSULE, GELATIN COATED ORAL ONCE
Status: DISCONTINUED | OUTPATIENT
Start: 2019-01-01 | End: 2019-01-01

## 2019-01-01 RX ORDER — LIDOCAINE HYDROCHLORIDE 10 MG/ML
1 INJECTION, SOLUTION EPIDURAL; INFILTRATION; INTRACAUDAL; PERINEURAL ONCE
Status: CANCELLED | OUTPATIENT
Start: 2019-01-01 | End: 2019-01-01

## 2019-01-01 RX ORDER — HEPARIN SODIUM 1000 [USP'U]/ML
INJECTION, SOLUTION INTRAVENOUS; SUBCUTANEOUS
Status: DISCONTINUED | OUTPATIENT
Start: 2019-01-01 | End: 2019-01-01

## 2019-01-01 RX ORDER — ALBUMIN HUMAN 50 G/1000ML
SOLUTION INTRAVENOUS
Status: COMPLETED
Start: 2019-01-01 | End: 2019-01-01

## 2019-01-01 RX ORDER — LISINOPRIL 2.5 MG/1
2.5 TABLET ORAL DAILY
Status: DISCONTINUED | OUTPATIENT
Start: 2019-01-01 | End: 2019-01-01

## 2019-01-01 RX ORDER — MUPIROCIN 20 MG/G
OINTMENT TOPICAL
Status: CANCELLED | OUTPATIENT
Start: 2019-01-01

## 2019-01-01 RX ORDER — CARVEDILOL 3.12 MG/1
3.12 TABLET ORAL 2 TIMES DAILY
Status: DISCONTINUED | OUTPATIENT
Start: 2019-01-01 | End: 2019-01-01

## 2019-01-01 RX ORDER — DOBUTAMINE HYDROCHLORIDE 200 MG/100ML
2.5 INJECTION INTRAVENOUS CONTINUOUS
Status: DISCONTINUED | OUTPATIENT
Start: 2019-01-01 | End: 2019-01-01

## 2019-01-01 RX ORDER — DOXAZOSIN 4 MG/1
4 TABLET ORAL NIGHTLY
Qty: 30 TABLET | Refills: 0 | Status: SHIPPED | OUTPATIENT
Start: 2019-01-01 | End: 2019-01-01 | Stop reason: HOSPADM

## 2019-01-01 RX ORDER — SULFAMETHOXAZOLE AND TRIMETHOPRIM 800; 160 MG/1; MG/1
1 TABLET ORAL 2 TIMES DAILY
Status: DISCONTINUED | OUTPATIENT
Start: 2019-01-01 | End: 2019-01-01 | Stop reason: HOSPADM

## 2019-01-01 RX ORDER — LIDOCAINE HYDROCHLORIDE 10 MG/ML
1 INJECTION, SOLUTION EPIDURAL; INFILTRATION; INTRACAUDAL; PERINEURAL ONCE
Status: DISCONTINUED | OUTPATIENT
Start: 2019-01-01 | End: 2019-01-01 | Stop reason: HOSPADM

## 2019-01-01 RX ORDER — TIOTROPIUM BROMIDE 18 UG/1
1 CAPSULE ORAL; RESPIRATORY (INHALATION) DAILY
Status: DISCONTINUED | OUTPATIENT
Start: 2019-01-01 | End: 2019-01-01 | Stop reason: HOSPADM

## 2019-01-01 RX ORDER — CARVEDILOL 3.12 MG/1
3.12 TABLET ORAL 2 TIMES DAILY
Status: DISCONTINUED | OUTPATIENT
Start: 2019-01-01 | End: 2019-01-01 | Stop reason: HOSPADM

## 2019-01-01 RX ORDER — POTASSIUM CHLORIDE 7.45 MG/ML
60 INJECTION INTRAVENOUS
Status: DISCONTINUED | OUTPATIENT
Start: 2019-01-01 | End: 2019-01-01

## 2019-01-01 RX ORDER — MAGNESIUM SULFATE HEPTAHYDRATE 40 MG/ML
4 INJECTION, SOLUTION INTRAVENOUS
Status: DISCONTINUED | OUTPATIENT
Start: 2019-01-01 | End: 2019-01-01

## 2019-01-01 RX ORDER — PHENYLEPHRINE HYDROCHLORIDE 10 MG/ML
INJECTION INTRAVENOUS
Status: DISCONTINUED | OUTPATIENT
Start: 2019-01-01 | End: 2019-01-01

## 2019-01-01 RX ORDER — MIRTAZAPINE 15 MG/1
15 TABLET, FILM COATED ORAL NIGHTLY
Status: DISCONTINUED | OUTPATIENT
Start: 2019-01-01 | End: 2019-01-01 | Stop reason: HOSPADM

## 2019-01-01 RX ORDER — CLOPIDOGREL BISULFATE 75 MG/1
75 TABLET ORAL DAILY
Status: DISCONTINUED | OUTPATIENT
Start: 2019-01-01 | End: 2019-01-01 | Stop reason: HOSPADM

## 2019-01-01 RX ORDER — LIDOCAINE HYDROCHLORIDE 10 MG/ML
1 INJECTION INFILTRATION; PERINEURAL ONCE
Status: COMPLETED | OUTPATIENT
Start: 2019-01-01 | End: 2019-01-01

## 2019-01-01 RX ORDER — ASPIRIN 325 MG
325 TABLET ORAL DAILY
Status: DISCONTINUED | OUTPATIENT
Start: 2019-01-01 | End: 2019-01-01

## 2019-01-01 RX ORDER — POTASSIUM CHLORIDE 20 MEQ/1
40 TABLET, EXTENDED RELEASE ORAL ONCE
Status: COMPLETED | OUTPATIENT
Start: 2019-01-01 | End: 2019-01-01

## 2019-01-01 RX ORDER — ASPIRIN 81 MG/1
81 TABLET ORAL DAILY
Status: DISCONTINUED | OUTPATIENT
Start: 2019-01-01 | End: 2019-01-01 | Stop reason: HOSPADM

## 2019-01-01 RX ORDER — FUROSEMIDE 10 MG/ML
80 INJECTION INTRAMUSCULAR; INTRAVENOUS ONCE
Status: DISCONTINUED | OUTPATIENT
Start: 2019-01-01 | End: 2019-01-01

## 2019-01-01 RX ORDER — MUPIROCIN 20 MG/G
OINTMENT TOPICAL
Status: DISCONTINUED | OUTPATIENT
Start: 2019-01-01 | End: 2019-01-01 | Stop reason: HOSPADM

## 2019-01-01 RX ORDER — TAMSULOSIN HYDROCHLORIDE 0.4 MG/1
0.8 CAPSULE ORAL DAILY
Status: DISCONTINUED | OUTPATIENT
Start: 2019-01-01 | End: 2019-01-01

## 2019-01-01 RX ORDER — SODIUM CHLORIDE 0.9 % (FLUSH) 0.9 %
10 SYRINGE (ML) INJECTION
Status: DISCONTINUED | OUTPATIENT
Start: 2019-01-01 | End: 2019-01-01 | Stop reason: HOSPADM

## 2019-01-01 RX ORDER — FUROSEMIDE 40 MG/1
40 TABLET ORAL DAILY
Status: DISCONTINUED | OUTPATIENT
Start: 2019-01-01 | End: 2019-01-01

## 2019-01-01 RX ORDER — CARVEDILOL 3.12 MG/1
3.12 TABLET ORAL 2 TIMES DAILY
Qty: 60 TABLET | Refills: 1 | Status: SHIPPED | OUTPATIENT
Start: 2019-01-01 | End: 2019-01-01

## 2019-01-01 RX ORDER — ACETAMINOPHEN 325 MG/1
650 TABLET ORAL EVERY 8 HOURS PRN
Status: DISCONTINUED | OUTPATIENT
Start: 2019-01-01 | End: 2019-01-01 | Stop reason: HOSPADM

## 2019-01-01 RX ORDER — ACETAMINOPHEN 325 MG/1
650 TABLET ORAL ONCE
Status: COMPLETED | OUTPATIENT
Start: 2019-01-01 | End: 2019-01-01

## 2019-01-01 RX ORDER — CHLORDIAZEPOXIDE HYDROCHLORIDE 5 MG/1
5 CAPSULE, GELATIN COATED ORAL 3 TIMES DAILY
Status: COMPLETED | OUTPATIENT
Start: 2019-01-01 | End: 2019-01-01

## 2019-01-01 RX ORDER — HALOPERIDOL 5 MG/ML
5 INJECTION INTRAMUSCULAR ONCE
Status: DISCONTINUED | OUTPATIENT
Start: 2019-01-01 | End: 2019-01-01 | Stop reason: HOSPADM

## 2019-01-01 RX ORDER — IPRATROPIUM BROMIDE AND ALBUTEROL SULFATE 2.5; .5 MG/3ML; MG/3ML
3 SOLUTION RESPIRATORY (INHALATION) EVERY 4 HOURS
Status: DISCONTINUED | OUTPATIENT
Start: 2019-01-01 | End: 2019-01-01 | Stop reason: HOSPADM

## 2019-01-01 RX ORDER — FUROSEMIDE 10 MG/ML
INJECTION INTRAMUSCULAR; INTRAVENOUS
Status: DISCONTINUED
Start: 2019-01-01 | End: 2019-01-01 | Stop reason: WASHOUT

## 2019-01-01 RX ORDER — FUROSEMIDE 40 MG/1
40 TABLET ORAL DAILY
Qty: 30 TABLET | Refills: 1 | Status: SHIPPED | OUTPATIENT
Start: 2019-01-01 | End: 2019-01-01

## 2019-01-01 RX ORDER — PHENYLEPHRINE HCL IN 0.9% NACL 1 MG/10 ML
SYRINGE (ML) INTRAVENOUS
Status: DISPENSED
Start: 2019-01-01 | End: 2019-01-01

## 2019-01-01 RX ORDER — HYDROCODONE BITARTRATE AND ACETAMINOPHEN 5; 325 MG/1; MG/1
1 TABLET ORAL EVERY 4 HOURS PRN
Status: DISCONTINUED | OUTPATIENT
Start: 2019-01-01 | End: 2019-01-01

## 2019-01-01 RX ORDER — MUPIROCIN 20 MG/G
1 OINTMENT TOPICAL 2 TIMES DAILY
Status: COMPLETED | OUTPATIENT
Start: 2019-01-01 | End: 2019-01-01

## 2019-01-01 RX ORDER — FENTANYL CITRATE-0.9 % NACL/PF 10 MCG/ML
25 PLASTIC BAG, INJECTION (ML) INTRAVENOUS CONTINUOUS
Status: DISCONTINUED | OUTPATIENT
Start: 2019-01-01 | End: 2019-01-01

## 2019-01-01 RX ORDER — MIDAZOLAM HYDROCHLORIDE 1 MG/ML
2 INJECTION INTRAMUSCULAR; INTRAVENOUS ONCE
Status: COMPLETED | OUTPATIENT
Start: 2019-01-01 | End: 2019-01-01

## 2019-01-01 RX ORDER — NOREPINEPHRINE BITARTRATE/D5W 4MG/250ML
0.02 PLASTIC BAG, INJECTION (ML) INTRAVENOUS CONTINUOUS
Status: DISCONTINUED | OUTPATIENT
Start: 2019-01-01 | End: 2019-01-01

## 2019-01-01 RX ORDER — HEPARIN SODIUM,PORCINE/D5W 25000/250
12 INTRAVENOUS SOLUTION INTRAVENOUS CONTINUOUS
Status: DISCONTINUED | OUTPATIENT
Start: 2019-01-01 | End: 2019-01-01

## 2019-01-01 RX ORDER — CHLORDIAZEPOXIDE HYDROCHLORIDE 5 MG/1
5 CAPSULE, GELATIN COATED ORAL ONCE
Status: COMPLETED | OUTPATIENT
Start: 2019-01-01 | End: 2019-01-01

## 2019-01-01 RX ORDER — SODIUM CHLORIDE 0.9 % (FLUSH) 0.9 %
5 SYRINGE (ML) INJECTION
Status: DISCONTINUED | OUTPATIENT
Start: 2019-01-01 | End: 2019-01-01 | Stop reason: HOSPADM

## 2019-01-01 RX ORDER — HYDRALAZINE HYDROCHLORIDE 20 MG/ML
10 INJECTION INTRAMUSCULAR; INTRAVENOUS EVERY 6 HOURS PRN
Status: DISCONTINUED | OUTPATIENT
Start: 2019-01-01 | End: 2019-01-01

## 2019-01-01 RX ORDER — POTASSIUM CHLORIDE 7.45 MG/ML
80 INJECTION INTRAVENOUS
Status: DISCONTINUED | OUTPATIENT
Start: 2019-01-01 | End: 2019-01-01

## 2019-01-01 RX ORDER — FENTANYL CITRATE 50 UG/ML
INJECTION, SOLUTION INTRAMUSCULAR; INTRAVENOUS
Status: DISCONTINUED | OUTPATIENT
Start: 2019-01-01 | End: 2019-01-01

## 2019-01-01 RX ORDER — ALBUMIN HUMAN 50 G/1000ML
25 SOLUTION INTRAVENOUS ONCE
Status: COMPLETED | OUTPATIENT
Start: 2019-01-01 | End: 2019-01-01

## 2019-01-01 RX ORDER — METOPROLOL TARTRATE 25 MG/1
12.5 TABLET ORAL ONCE
Status: COMPLETED | OUTPATIENT
Start: 2019-01-01 | End: 2019-01-01

## 2019-01-01 RX ORDER — METOPROLOL TARTRATE 1 MG/ML
INJECTION, SOLUTION INTRAVENOUS
Status: COMPLETED
Start: 2019-01-01 | End: 2019-01-01

## 2019-01-01 RX ORDER — METOPROLOL TARTRATE 1 MG/ML
5 INJECTION, SOLUTION INTRAVENOUS EVERY 5 MIN PRN
Status: COMPLETED | OUTPATIENT
Start: 2019-01-01 | End: 2019-01-01

## 2019-01-01 RX ORDER — LABETALOL HCL 20 MG/4 ML
10 SYRINGE (ML) INTRAVENOUS ONCE AS NEEDED
Status: DISCONTINUED | OUTPATIENT
Start: 2019-01-01 | End: 2019-01-01 | Stop reason: HOSPADM

## 2019-01-01 RX ORDER — PROPOFOL 10 MG/ML
5 INJECTION, EMULSION INTRAVENOUS CONTINUOUS
Status: DISCONTINUED | OUTPATIENT
Start: 2019-01-01 | End: 2019-01-01

## 2019-01-01 RX ORDER — TRAZODONE HYDROCHLORIDE 100 MG/1
100 TABLET ORAL NIGHTLY
Qty: 30 TABLET | Refills: 0 | Status: SHIPPED | OUTPATIENT
Start: 2019-01-01 | End: 2020-07-16

## 2019-01-01 RX ORDER — CEFAZOLIN SODIUM 1 G/3ML
2 INJECTION, POWDER, FOR SOLUTION INTRAMUSCULAR; INTRAVENOUS
Status: COMPLETED | OUTPATIENT
Start: 2019-01-01 | End: 2019-01-01

## 2019-01-01 RX ORDER — IBUPROFEN 200 MG
16 TABLET ORAL
Status: DISCONTINUED | OUTPATIENT
Start: 2019-01-01 | End: 2019-01-01 | Stop reason: HOSPADM

## 2019-01-01 RX ORDER — VANCOMYCIN HCL IN 5 % DEXTROSE 1G/250ML
1000 PLASTIC BAG, INJECTION (ML) INTRAVENOUS
Status: DISCONTINUED | OUTPATIENT
Start: 2019-01-01 | End: 2019-01-01

## 2019-01-01 RX ORDER — TIOTROPIUM BROMIDE 18 UG/1
1 CAPSULE ORAL; RESPIRATORY (INHALATION) DAILY
Qty: 30 CAPSULE | Refills: 11 | Status: SHIPPED | OUTPATIENT
Start: 2019-01-01 | End: 2020-04-10

## 2019-01-01 RX ORDER — ZIPRASIDONE HYDROCHLORIDE 20 MG/1
20 CAPSULE ORAL 2 TIMES DAILY
Qty: 60 CAPSULE | Refills: 11 | Status: SHIPPED | OUTPATIENT
Start: 2019-01-01 | End: 2019-01-01 | Stop reason: HOSPADM

## 2019-01-01 RX ORDER — SODIUM CHLORIDE, SODIUM LACTATE, POTASSIUM CHLORIDE, CALCIUM CHLORIDE 600; 310; 30; 20 MG/100ML; MG/100ML; MG/100ML; MG/100ML
INJECTION, SOLUTION INTRAVENOUS CONTINUOUS
Status: DISCONTINUED | OUTPATIENT
Start: 2019-01-01 | End: 2019-01-01

## 2019-01-01 RX ORDER — ESMOLOL HYDROCHLORIDE 20 MG/ML
150 INJECTION, SOLUTION INTRAVENOUS CONTINUOUS
Status: DISCONTINUED | OUTPATIENT
Start: 2019-01-01 | End: 2019-01-01

## 2019-01-01 RX ORDER — SODIUM CHLORIDE 0.9 % (FLUSH) 0.9 %
10 SYRINGE (ML) INJECTION
Status: DISCONTINUED | OUTPATIENT
Start: 2019-01-01 | End: 2020-01-06 | Stop reason: HOSPADM

## 2019-01-01 RX ORDER — ASPIRIN 325 MG
325 TABLET ORAL ONCE
Status: COMPLETED | OUTPATIENT
Start: 2019-01-01 | End: 2019-01-01

## 2019-01-01 RX ORDER — HALOPERIDOL 5 MG/ML
INJECTION INTRAMUSCULAR
Status: COMPLETED
Start: 2019-01-01 | End: 2019-01-01

## 2019-01-01 RX ORDER — POTASSIUM CHLORIDE 20 MEQ/15ML
40 SOLUTION ORAL ONCE
Status: COMPLETED | OUTPATIENT
Start: 2019-01-01 | End: 2019-01-01

## 2019-01-01 RX ORDER — MIDAZOLAM HYDROCHLORIDE 1 MG/ML
INJECTION INTRAMUSCULAR; INTRAVENOUS
Status: DISPENSED
Start: 2019-01-01 | End: 2019-01-01

## 2019-01-01 RX ORDER — TAMSULOSIN HYDROCHLORIDE 0.4 MG/1
0.4 CAPSULE ORAL DAILY
Qty: 30 CAPSULE | Refills: 1 | Status: SHIPPED | OUTPATIENT
Start: 2019-01-01 | End: 2019-01-01

## 2019-01-01 RX ORDER — PROTAMINE SULFATE 10 MG/ML
INJECTION, SOLUTION INTRAVENOUS
Status: DISCONTINUED | OUTPATIENT
Start: 2019-01-01 | End: 2019-01-01

## 2019-01-01 RX ORDER — POTASSIUM CHLORIDE 7.45 MG/ML
10 INJECTION INTRAVENOUS
Status: ACTIVE | OUTPATIENT
Start: 2019-01-01 | End: 2019-01-01

## 2019-01-01 RX ORDER — ASPIRIN 300 MG/1
300 SUPPOSITORY RECTAL DAILY
Status: DISCONTINUED | OUTPATIENT
Start: 2019-01-01 | End: 2019-01-01

## 2019-01-01 RX ORDER — POTASSIUM CHLORIDE 7.45 MG/ML
40 INJECTION INTRAVENOUS
Status: DISCONTINUED | OUTPATIENT
Start: 2019-01-01 | End: 2019-01-01

## 2019-01-01 RX ORDER — CEFAZOLIN SODIUM 1 G/3ML
2 INJECTION, POWDER, FOR SOLUTION INTRAMUSCULAR; INTRAVENOUS
Status: DISCONTINUED | OUTPATIENT
Start: 2019-01-01 | End: 2019-01-01 | Stop reason: HOSPADM

## 2019-01-01 RX ORDER — THIAMINE HCL 100 MG
100 TABLET ORAL DAILY
Status: DISCONTINUED | OUTPATIENT
Start: 2019-01-01 | End: 2019-01-01 | Stop reason: HOSPADM

## 2019-01-01 RX ORDER — MAGNESIUM SULFATE HEPTAHYDRATE 40 MG/ML
2 INJECTION, SOLUTION INTRAVENOUS ONCE
Status: COMPLETED | OUTPATIENT
Start: 2019-01-01 | End: 2019-01-01

## 2019-01-01 RX ORDER — VANCOMYCIN HCL IN 5 % DEXTROSE 1G/250ML
1000 PLASTIC BAG, INJECTION (ML) INTRAVENOUS
Status: COMPLETED | OUTPATIENT
Start: 2019-01-01 | End: 2019-01-01

## 2019-01-01 RX ORDER — AMLODIPINE BESYLATE 5 MG/1
5 TABLET ORAL DAILY
Status: DISCONTINUED | OUTPATIENT
Start: 2019-01-01 | End: 2019-01-01

## 2019-01-01 RX ORDER — CHLORDIAZEPOXIDE HYDROCHLORIDE 5 MG/1
10 CAPSULE, GELATIN COATED ORAL 4 TIMES DAILY
Status: COMPLETED | OUTPATIENT
Start: 2019-01-01 | End: 2019-01-01

## 2019-01-01 RX ORDER — TRAZODONE HYDROCHLORIDE 100 MG/1
100 TABLET ORAL NIGHTLY
Status: DISCONTINUED | OUTPATIENT
Start: 2019-01-01 | End: 2019-01-01 | Stop reason: HOSPADM

## 2019-01-01 RX ORDER — NOREPINEPHRINE BITARTRATE/D5W 4MG/250ML
PLASTIC BAG, INJECTION (ML) INTRAVENOUS
Status: COMPLETED
Start: 2019-01-01 | End: 2019-01-01

## 2019-01-01 RX ORDER — IBUPROFEN 200 MG
1 TABLET ORAL DAILY
Qty: 28 PATCH | Refills: 0 | COMMUNITY
Start: 2019-01-01

## 2019-01-01 RX ORDER — DIAZEPAM 10 MG/2ML
5 INJECTION INTRAMUSCULAR ONCE
Status: DISCONTINUED | OUTPATIENT
Start: 2019-01-01 | End: 2019-01-01

## 2019-01-01 RX ORDER — FENTANYL CITRATE 50 UG/ML
25 INJECTION, SOLUTION INTRAMUSCULAR; INTRAVENOUS
Status: DISCONTINUED | OUTPATIENT
Start: 2019-01-01 | End: 2019-01-01

## 2019-01-01 RX ORDER — HEPARIN SODIUM 1000 [USP'U]/ML
INJECTION, SOLUTION INTRAVENOUS; SUBCUTANEOUS
Status: DISCONTINUED | OUTPATIENT
Start: 2019-01-01 | End: 2019-01-01 | Stop reason: HOSPADM

## 2019-01-01 RX ORDER — SULFAMETHOXAZOLE AND TRIMETHOPRIM 800; 160 MG/1; MG/1
1 TABLET ORAL 2 TIMES DAILY
Qty: 28 TABLET | Refills: 0 | Status: SHIPPED | OUTPATIENT
Start: 2019-01-01 | End: 2019-07-31

## 2019-01-01 RX ORDER — HALOPERIDOL 5 MG/ML
5 INJECTION INTRAMUSCULAR EVERY 8 HOURS PRN
Status: DISCONTINUED | OUTPATIENT
Start: 2019-01-01 | End: 2019-01-01

## 2019-01-01 RX ORDER — DOXAZOSIN 2 MG/1
4 TABLET ORAL DAILY
Status: DISCONTINUED | OUTPATIENT
Start: 2019-01-01 | End: 2019-01-01

## 2019-01-01 RX ORDER — HYDRALAZINE HYDROCHLORIDE 20 MG/ML
5 INJECTION INTRAMUSCULAR; INTRAVENOUS EVERY 6 HOURS PRN
Status: DISCONTINUED | OUTPATIENT
Start: 2019-01-01 | End: 2019-01-01

## 2019-01-01 RX ORDER — RAMELTEON 8 MG/1
8 TABLET ORAL NIGHTLY PRN
Status: DISCONTINUED | OUTPATIENT
Start: 2019-01-01 | End: 2019-01-01 | Stop reason: HOSPADM

## 2019-01-01 RX ORDER — CHLORDIAZEPOXIDE HYDROCHLORIDE 5 MG/1
5 CAPSULE, GELATIN COATED ORAL 2 TIMES DAILY
Status: COMPLETED | OUTPATIENT
Start: 2019-01-01 | End: 2019-01-01

## 2019-01-01 RX ORDER — LORAZEPAM 2 MG/ML
INJECTION INTRAMUSCULAR
Status: COMPLETED
Start: 2019-01-01 | End: 2019-01-01

## 2019-01-01 RX ORDER — FENTANYL CITRATE 50 UG/ML
50 INJECTION, SOLUTION INTRAMUSCULAR; INTRAVENOUS
Status: DISCONTINUED | OUTPATIENT
Start: 2019-01-01 | End: 2019-01-01

## 2019-01-01 RX ORDER — ZIPRASIDONE HYDROCHLORIDE 20 MG/1
20 CAPSULE ORAL 2 TIMES DAILY
Qty: 60 CAPSULE | Refills: 0 | Status: SHIPPED | OUTPATIENT
Start: 2019-01-01 | End: 2019-08-16

## 2019-01-01 RX ORDER — ATORVASTATIN CALCIUM 20 MG/1
80 TABLET, FILM COATED ORAL DAILY
Status: DISCONTINUED | OUTPATIENT
Start: 2019-01-01 | End: 2019-01-01

## 2019-01-01 RX ORDER — ACETAMINOPHEN 650 MG/1
650 SUPPOSITORY RECTAL EVERY 6 HOURS PRN
Status: DISCONTINUED | OUTPATIENT
Start: 2019-01-01 | End: 2019-01-01

## 2019-01-01 RX ORDER — CLOPIDOGREL 300 MG/1
300 TABLET, FILM COATED ORAL ONCE
Status: COMPLETED | OUTPATIENT
Start: 2019-01-01 | End: 2019-01-01

## 2019-01-01 RX ORDER — MAGNESIUM SULFATE HEPTAHYDRATE 40 MG/ML
2 INJECTION, SOLUTION INTRAVENOUS
Status: DISCONTINUED | OUTPATIENT
Start: 2019-01-01 | End: 2019-01-01

## 2019-01-01 RX ORDER — DOXAZOSIN 4 MG/1
4 TABLET ORAL NIGHTLY
Status: DISCONTINUED | OUTPATIENT
Start: 2019-01-01 | End: 2019-01-01

## 2019-01-01 RX ORDER — FUROSEMIDE 20 MG/1
20 TABLET ORAL DAILY
Status: DISCONTINUED | OUTPATIENT
Start: 2019-01-01 | End: 2019-01-01 | Stop reason: HOSPADM

## 2019-01-01 RX ORDER — TAMSULOSIN HYDROCHLORIDE 0.4 MG/1
0.4 CAPSULE ORAL ONCE
Status: COMPLETED | OUTPATIENT
Start: 2019-01-01 | End: 2019-01-01

## 2019-01-01 RX ORDER — NAPROXEN SODIUM 220 MG/1
81 TABLET, FILM COATED ORAL DAILY
Status: DISCONTINUED | OUTPATIENT
Start: 2019-01-01 | End: 2019-01-01 | Stop reason: HOSPADM

## 2019-01-01 RX ORDER — GLUCAGON 1 MG
1 KIT INJECTION
Status: DISCONTINUED | OUTPATIENT
Start: 2019-01-01 | End: 2019-01-01 | Stop reason: HOSPADM

## 2019-01-01 RX ORDER — ATORVASTATIN CALCIUM 80 MG/1
80 TABLET, FILM COATED ORAL DAILY
Qty: 30 TABLET | Refills: 1 | Status: SHIPPED | OUTPATIENT
Start: 2019-01-01

## 2019-01-01 RX ORDER — FOLIC ACID 1 MG/1
1 TABLET ORAL DAILY
Status: DISCONTINUED | OUTPATIENT
Start: 2019-01-01 | End: 2019-01-01 | Stop reason: HOSPADM

## 2019-01-01 RX ORDER — TAMSULOSIN HYDROCHLORIDE 0.4 MG/1
0.4 CAPSULE ORAL DAILY
Status: DISCONTINUED | OUTPATIENT
Start: 2019-01-01 | End: 2019-01-01

## 2019-01-01 RX ORDER — ACETAMINOPHEN 650 MG/1
650 SUPPOSITORY RECTAL EVERY 6 HOURS PRN
Status: DISCONTINUED | OUTPATIENT
Start: 2019-01-01 | End: 2019-01-01 | Stop reason: HOSPADM

## 2019-01-01 RX ORDER — CHLORHEXIDINE GLUCONATE ORAL RINSE 1.2 MG/ML
15 SOLUTION DENTAL 2 TIMES DAILY
Status: DISCONTINUED | OUTPATIENT
Start: 2019-01-01 | End: 2019-01-01

## 2019-01-01 RX ORDER — ALBUMIN HUMAN 50 G/1000ML
12.5 SOLUTION INTRAVENOUS ONCE
Status: COMPLETED | OUTPATIENT
Start: 2019-01-01 | End: 2019-01-01

## 2019-01-01 RX ORDER — ONDANSETRON 8 MG/1
8 TABLET, ORALLY DISINTEGRATING ORAL EVERY 8 HOURS PRN
Status: DISCONTINUED | OUTPATIENT
Start: 2019-01-01 | End: 2019-01-01 | Stop reason: HOSPADM

## 2019-01-01 RX ORDER — FAMOTIDINE 10 MG/ML
20 INJECTION INTRAVENOUS DAILY
Status: DISCONTINUED | OUTPATIENT
Start: 2019-01-01 | End: 2019-01-01

## 2019-01-01 RX ORDER — FUROSEMIDE 10 MG/ML
40 INJECTION INTRAMUSCULAR; INTRAVENOUS ONCE
Status: COMPLETED | OUTPATIENT
Start: 2019-01-01 | End: 2019-01-01

## 2019-01-01 RX ORDER — FAMOTIDINE 20 MG/1
20 TABLET, FILM COATED ORAL 2 TIMES DAILY
Status: DISCONTINUED | OUTPATIENT
Start: 2019-01-01 | End: 2019-01-01

## 2019-01-01 RX ADMIN — FENTANYL CITRATE 25 MCG: 50 INJECTION INTRAMUSCULAR; INTRAVENOUS at 04:07

## 2019-01-01 RX ADMIN — IPRATROPIUM BROMIDE AND ALBUTEROL SULFATE 3 ML: .5; 3 SOLUTION RESPIRATORY (INHALATION) at 07:07

## 2019-01-01 RX ADMIN — IPRATROPIUM BROMIDE AND ALBUTEROL SULFATE 3 ML: .5; 3 SOLUTION RESPIRATORY (INHALATION) at 04:07

## 2019-01-01 RX ADMIN — MIRTAZAPINE 15 MG: 15 TABLET, FILM COATED ORAL at 03:04

## 2019-01-01 RX ADMIN — CHLORDIAZEPOXIDE HYDROCHLORIDE 10 MG: 5 CAPSULE ORAL at 09:07

## 2019-01-01 RX ADMIN — LACTULOSE 30 G: 20 SOLUTION ORAL at 01:04

## 2019-01-01 RX ADMIN — NICOTINE 1 PATCH: 21 PATCH, EXTENDED RELEASE TRANSDERMAL at 09:07

## 2019-01-01 RX ADMIN — DEXMEDETOMIDINE HYDROCHLORIDE 0.2 MCG/KG/HR: 4 INJECTION, SOLUTION INTRAVENOUS at 09:07

## 2019-01-01 RX ADMIN — TRAZODONE HYDROCHLORIDE 100 MG: 50 TABLET ORAL at 08:07

## 2019-01-01 RX ADMIN — POTASSIUM CHLORIDE 10 MEQ: 10 INJECTION, SOLUTION INTRAVENOUS at 12:07

## 2019-01-01 RX ADMIN — ROCURONIUM BROMIDE 10 MG: 10 INJECTION, SOLUTION INTRAVENOUS at 10:07

## 2019-01-01 RX ADMIN — SODIUM CHLORIDE, SODIUM GLUCONATE, SODIUM ACETATE, POTASSIUM CHLORIDE, MAGNESIUM CHLORIDE, SODIUM PHOSPHATE, DIBASIC, AND POTASSIUM PHOSPHATE: .53; .5; .37; .037; .03; .012; .00082 INJECTION, SOLUTION INTRAVENOUS at 08:07

## 2019-01-01 RX ADMIN — POTASSIUM CHLORIDE 40 MEQ: 20 SOLUTION ORAL at 02:07

## 2019-01-01 RX ADMIN — MIDAZOLAM HYDROCHLORIDE 2 MG: 1 INJECTION, SOLUTION INTRAMUSCULAR; INTRAVENOUS at 11:07

## 2019-01-01 RX ADMIN — POTASSIUM CHLORIDE 40 MEQ: 1500 TABLET, EXTENDED RELEASE ORAL at 06:07

## 2019-01-01 RX ADMIN — MIRTAZAPINE 15 MG: 15 TABLET, FILM COATED ORAL at 10:04

## 2019-01-01 RX ADMIN — AMOXICILLIN AND CLAVULANATE POTASSIUM 1 TABLET: 875; 125 TABLET, FILM COATED ORAL at 09:07

## 2019-01-01 RX ADMIN — ASPIRIN 325 MG ORAL TABLET 325 MG: 325 PILL ORAL at 09:07

## 2019-01-01 RX ADMIN — DEXMEDETOMIDINE HYDROCHLORIDE 1 MCG/KG/HR: 4 INJECTION, SOLUTION INTRAVENOUS at 10:07

## 2019-01-01 RX ADMIN — CARVEDILOL 3.12 MG: 3.12 TABLET, FILM COATED ORAL at 08:04

## 2019-01-01 RX ADMIN — LORAZEPAM 1 MG: 2 INJECTION INTRAMUSCULAR; INTRAVENOUS at 09:07

## 2019-01-01 RX ADMIN — SENNOSIDES 8.6 MG: 8.6 TABLET, FILM COATED ORAL at 10:04

## 2019-01-01 RX ADMIN — HEPARIN SODIUM 5000 UNITS: 5000 INJECTION, SOLUTION INTRAVENOUS; SUBCUTANEOUS at 09:07

## 2019-01-01 RX ADMIN — Medication 100 MG: at 09:07

## 2019-01-01 RX ADMIN — NICOTINE 1 PATCH: 21 PATCH, EXTENDED RELEASE TRANSDERMAL at 10:07

## 2019-01-01 RX ADMIN — ATORVASTATIN CALCIUM 80 MG: 20 TABLET, FILM COATED ORAL at 10:07

## 2019-01-01 RX ADMIN — DEXMEDETOMIDINE HYDROCHLORIDE 0.8 MCG/KG/HR: 4 INJECTION, SOLUTION INTRAVENOUS at 09:07

## 2019-01-01 RX ADMIN — IPRATROPIUM BROMIDE AND ALBUTEROL SULFATE 3 ML: .5; 3 SOLUTION RESPIRATORY (INHALATION) at 08:07

## 2019-01-01 RX ADMIN — THERA TABS 1 TABLET: TAB at 09:07

## 2019-01-01 RX ADMIN — TAMSULOSIN HYDROCHLORIDE 0.4 MG: 0.4 CAPSULE ORAL at 10:04

## 2019-01-01 RX ADMIN — CHLORDIAZEPOXIDE HYDROCHLORIDE 5 MG: 5 CAPSULE ORAL at 10:07

## 2019-01-01 RX ADMIN — ATORVASTATIN CALCIUM 80 MG: 20 TABLET, FILM COATED ORAL at 09:07

## 2019-01-01 RX ADMIN — CLOPIDOGREL 75 MG: 75 TABLET, FILM COATED ORAL at 08:07

## 2019-01-01 RX ADMIN — HEPARIN SODIUM 5000 UNITS: 5000 INJECTION, SOLUTION INTRAVENOUS; SUBCUTANEOUS at 05:07

## 2019-01-01 RX ADMIN — CHLORHEXIDINE GLUCONATE 0.12% ORAL RINSE 15 ML: 1.2 LIQUID ORAL at 08:07

## 2019-01-01 RX ADMIN — Medication 0.08 MCG/KG/MIN: at 04:07

## 2019-01-01 RX ADMIN — LISINOPRIL 2.5 MG: 2.5 TABLET ORAL at 09:04

## 2019-01-01 RX ADMIN — FUROSEMIDE 40 MG: 10 INJECTION, SOLUTION INTRAMUSCULAR; INTRAVENOUS at 05:07

## 2019-01-01 RX ADMIN — ALBUMIN (HUMAN) 25 G: 12.5 SOLUTION INTRAVENOUS at 05:07

## 2019-01-01 RX ADMIN — FUROSEMIDE 20 MG: 10 INJECTION, SOLUTION INTRAMUSCULAR; INTRAVENOUS at 06:07

## 2019-01-01 RX ADMIN — FUROSEMIDE 40 MG: 10 INJECTION, SOLUTION INTRAMUSCULAR; INTRAVENOUS at 08:04

## 2019-01-01 RX ADMIN — ASPIRIN 81 MG CHEWABLE TABLET 81 MG: 81 TABLET CHEWABLE at 08:07

## 2019-01-01 RX ADMIN — HYDROCODONE BITARTRATE AND ACETAMINOPHEN 1 TABLET: 5; 325 TABLET ORAL at 06:07

## 2019-01-01 RX ADMIN — AMOXICILLIN AND CLAVULANATE POTASSIUM 1 TABLET: 875; 125 TABLET, FILM COATED ORAL at 10:07

## 2019-01-01 RX ADMIN — CLOPIDOGREL 75 MG: 75 TABLET, FILM COATED ORAL at 10:07

## 2019-01-01 RX ADMIN — CHLORDIAZEPOXIDE HYDROCHLORIDE 5 MG: 5 CAPSULE ORAL at 09:07

## 2019-01-01 RX ADMIN — CARVEDILOL 3.12 MG: 3.12 TABLET, FILM COATED ORAL at 10:04

## 2019-01-01 RX ADMIN — HEPARIN SODIUM 5000 UNITS: 5000 INJECTION, SOLUTION INTRAVENOUS; SUBCUTANEOUS at 10:04

## 2019-01-01 RX ADMIN — FUROSEMIDE 40 MG: 10 INJECTION, SOLUTION INTRAMUSCULAR; INTRAVENOUS at 06:04

## 2019-01-01 RX ADMIN — TIOTROPIUM BROMIDE 18 MCG: 18 CAPSULE ORAL; RESPIRATORY (INHALATION) at 10:04

## 2019-01-01 RX ADMIN — ATORVASTATIN CALCIUM 80 MG: 20 TABLET, FILM COATED ORAL at 10:04

## 2019-01-01 RX ADMIN — HYDROCODONE BITARTRATE AND ACETAMINOPHEN 1 TABLET: 5; 325 TABLET ORAL at 08:07

## 2019-01-01 RX ADMIN — ATORVASTATIN CALCIUM 80 MG: 20 TABLET, FILM COATED ORAL at 09:04

## 2019-01-01 RX ADMIN — TAMSULOSIN HYDROCHLORIDE 0.4 MG: 0.4 CAPSULE ORAL at 09:04

## 2019-01-01 RX ADMIN — HEPARIN SODIUM 1000 UNITS: 1000 INJECTION, SOLUTION INTRAVENOUS; SUBCUTANEOUS at 09:07

## 2019-01-01 RX ADMIN — FENTANYL CITRATE 100 MCG: 50 INJECTION, SOLUTION INTRAMUSCULAR; INTRAVENOUS at 08:07

## 2019-01-01 RX ADMIN — HEPARIN SODIUM 5000 UNITS: 5000 INJECTION, SOLUTION INTRAVENOUS; SUBCUTANEOUS at 01:07

## 2019-01-01 RX ADMIN — ALBUMIN HUMAN 12.5 G: 50 SOLUTION INTRAVENOUS at 12:07

## 2019-01-01 RX ADMIN — CLOPIDOGREL 75 MG: 75 TABLET, FILM COATED ORAL at 10:04

## 2019-01-01 RX ADMIN — FUROSEMIDE 20 MG: 10 INJECTION, SOLUTION INTRAMUSCULAR; INTRAVENOUS at 05:07

## 2019-01-01 RX ADMIN — LIDOCAINE HYDROCHLORIDE 100 MG: 20 INJECTION, SOLUTION INTRAVENOUS at 08:07

## 2019-01-01 RX ADMIN — ASPIRIN 81 MG CHEWABLE TABLET 81 MG: 81 TABLET CHEWABLE at 09:07

## 2019-01-01 RX ADMIN — DEXMEDETOMIDINE HYDROCHLORIDE 0.8 MCG/KG/HR: 4 INJECTION, SOLUTION INTRAVENOUS at 11:07

## 2019-01-01 RX ADMIN — STANDARDIZED SENNA CONCENTRATE AND DOCUSATE SODIUM 1 TABLET: 8.6; 5 TABLET, FILM COATED ORAL at 04:04

## 2019-01-01 RX ADMIN — METOPROLOL TARTRATE 5 MG: 5 INJECTION INTRAVENOUS at 04:07

## 2019-01-01 RX ADMIN — ATORVASTATIN CALCIUM 80 MG: 20 TABLET, FILM COATED ORAL at 08:04

## 2019-01-01 RX ADMIN — TAMSULOSIN HYDROCHLORIDE 0.4 MG: 0.4 CAPSULE ORAL at 09:07

## 2019-01-01 RX ADMIN — CARVEDILOL 3.12 MG: 3.12 TABLET, FILM COATED ORAL at 09:07

## 2019-01-01 RX ADMIN — LIDOCAINE HYDROCHLORIDE 1 ML: 10 INJECTION, SOLUTION INFILTRATION; PERINEURAL at 12:07

## 2019-01-01 RX ADMIN — HALOPERIDOL 5 MG: 5 INJECTION INTRAMUSCULAR at 01:07

## 2019-01-01 RX ADMIN — IPRATROPIUM BROMIDE AND ALBUTEROL SULFATE 3 ML: .5; 3 SOLUTION RESPIRATORY (INHALATION) at 03:07

## 2019-01-01 RX ADMIN — CARVEDILOL 6.25 MG: 6.25 TABLET, FILM COATED ORAL at 10:07

## 2019-01-01 RX ADMIN — IPRATROPIUM BROMIDE AND ALBUTEROL SULFATE 3 ML: .5; 3 SOLUTION RESPIRATORY (INHALATION) at 12:07

## 2019-01-01 RX ADMIN — AMOXICILLIN AND CLAVULANATE POTASSIUM 1 TABLET: 875; 125 TABLET, FILM COATED ORAL at 08:07

## 2019-01-01 RX ADMIN — LOSARTAN POTASSIUM 12.5 MG: 25 TABLET, FILM COATED ORAL at 10:07

## 2019-01-01 RX ADMIN — METOPROLOL TARTRATE 5 MG: 5 INJECTION INTRAVENOUS at 11:07

## 2019-01-01 RX ADMIN — CLOPIDOGREL 75 MG: 75 TABLET, FILM COATED ORAL at 08:04

## 2019-01-01 RX ADMIN — CHLORDIAZEPOXIDE HYDROCHLORIDE 10 MG: 5 CAPSULE ORAL at 02:07

## 2019-01-01 RX ADMIN — FAMOTIDINE 20 MG: 10 INJECTION, SOLUTION INTRAVENOUS at 09:07

## 2019-01-01 RX ADMIN — HYDROCODONE BITARTRATE AND ACETAMINOPHEN 1 TABLET: 5; 325 TABLET ORAL at 10:07

## 2019-01-01 RX ADMIN — DEXMEDETOMIDINE HYDROCHLORIDE 0.8 MCG/KG/HR: 4 INJECTION, SOLUTION INTRAVENOUS at 06:07

## 2019-01-01 RX ADMIN — HALOPERIDOL LACTATE 5 MG: 5 INJECTION, SOLUTION INTRAMUSCULAR at 09:07

## 2019-01-01 RX ADMIN — HEPARIN SODIUM 5000 UNITS: 5000 INJECTION, SOLUTION INTRAVENOUS; SUBCUTANEOUS at 10:07

## 2019-01-01 RX ADMIN — METOPROLOL TARTRATE 5 MG: 5 INJECTION INTRAVENOUS at 05:07

## 2019-01-01 RX ADMIN — HEPARIN SODIUM 5000 UNITS: 5000 INJECTION, SOLUTION INTRAVENOUS; SUBCUTANEOUS at 02:07

## 2019-01-01 RX ADMIN — CEFAZOLIN 2 G: 1 INJECTION, POWDER, FOR SOLUTION INTRAMUSCULAR; INTRAVENOUS at 09:07

## 2019-01-01 RX ADMIN — DEXMEDETOMIDINE HYDROCHLORIDE 1 MCG/KG/HR: 4 INJECTION, SOLUTION INTRAVENOUS at 05:07

## 2019-01-01 RX ADMIN — HALOPERIDOL LACTATE 5 MG: 5 INJECTION INTRAMUSCULAR at 05:07

## 2019-01-01 RX ADMIN — ACETAMINOPHEN 650 MG: 650 SUPPOSITORY RECTAL at 04:07

## 2019-01-01 RX ADMIN — MICONAZOLE NITRATE: 20 OINTMENT TOPICAL at 09:07

## 2019-01-01 RX ADMIN — DEXMEDETOMIDINE HYDROCHLORIDE 0.6 MCG/KG/HR: 4 INJECTION, SOLUTION INTRAVENOUS at 12:07

## 2019-01-01 RX ADMIN — LISINOPRIL 2.5 MG: 2.5 TABLET ORAL at 08:04

## 2019-01-01 RX ADMIN — MUPIROCIN 1 G: 20 OINTMENT TOPICAL at 08:07

## 2019-01-01 RX ADMIN — CLOPIDOGREL 75 MG: 75 TABLET, FILM COATED ORAL at 09:07

## 2019-01-01 RX ADMIN — MIRTAZAPINE 15 MG: 15 TABLET, FILM COATED ORAL at 09:04

## 2019-01-01 RX ADMIN — HYDRALAZINE HYDROCHLORIDE 10 MG: 20 INJECTION INTRAMUSCULAR; INTRAVENOUS at 01:07

## 2019-01-01 RX ADMIN — LORAZEPAM 2 MG: 2 INJECTION INTRAMUSCULAR; INTRAVENOUS at 09:07

## 2019-01-01 RX ADMIN — FUROSEMIDE 20 MG: 20 TABLET ORAL at 01:07

## 2019-01-01 RX ADMIN — LORAZEPAM 0.25 MG: 2 INJECTION INTRAMUSCULAR; INTRAVENOUS at 12:07

## 2019-01-01 RX ADMIN — CARVEDILOL 3.12 MG: 3.12 TABLET, FILM COATED ORAL at 09:04

## 2019-01-01 RX ADMIN — HYDRALAZINE HYDROCHLORIDE 10 MG: 20 INJECTION INTRAMUSCULAR; INTRAVENOUS at 04:07

## 2019-01-01 RX ADMIN — DOBUTAMINE IN DEXTROSE 1 MCG/KG/MIN: 200 INJECTION, SOLUTION INTRAVENOUS at 09:07

## 2019-01-01 RX ADMIN — CLOPIDOGREL BISULFATE 300 MG: 300 TABLET, FILM COATED ORAL at 06:07

## 2019-01-01 RX ADMIN — FUROSEMIDE 40 MG: 10 INJECTION, SOLUTION INTRAMUSCULAR; INTRAVENOUS at 05:04

## 2019-01-01 RX ADMIN — POLYETHYLENE GLYCOL 3350 17 G: 17 POWDER, FOR SOLUTION ORAL at 10:04

## 2019-01-01 RX ADMIN — Medication 100 MG: at 08:07

## 2019-01-01 RX ADMIN — TUBERCULIN PURIFIED PROTEIN DERIVATIVE 5 UNITS: 5 INJECTION, SOLUTION INTRADERMAL at 04:07

## 2019-01-01 RX ADMIN — MUPIROCIN 1 G: 20 OINTMENT TOPICAL at 09:07

## 2019-01-01 RX ADMIN — LABETALOL HCL IV SOLN PREFILLED SYRINGE 20 MG/4ML (5 MG/ML) 10 MG: 20/4 SOLUTION PREFILLED SYRINGE at 01:07

## 2019-01-01 RX ADMIN — FOLIC ACID 1 MG: 1 TABLET ORAL at 09:07

## 2019-01-01 RX ADMIN — ASPIRIN 81 MG: 81 TABLET, COATED ORAL at 10:04

## 2019-01-01 RX ADMIN — IPRATROPIUM BROMIDE AND ALBUTEROL SULFATE 3 ML: .5; 3 SOLUTION RESPIRATORY (INHALATION) at 11:07

## 2019-01-01 RX ADMIN — PROPOFOL 70 MG: 10 INJECTION, EMULSION INTRAVENOUS at 08:07

## 2019-01-01 RX ADMIN — METOPROLOL TARTRATE 12.5 MG: 25 TABLET, FILM COATED ORAL at 09:07

## 2019-01-01 RX ADMIN — CEFEPIME 1 G: 1 INJECTION, POWDER, FOR SOLUTION INTRAMUSCULAR; INTRAVENOUS at 05:07

## 2019-01-01 RX ADMIN — HALOPERIDOL LACTATE: 5 INJECTION, SOLUTION INTRAMUSCULAR at 06:07

## 2019-01-01 RX ADMIN — ATORVASTATIN CALCIUM 80 MG: 20 TABLET, FILM COATED ORAL at 08:07

## 2019-01-01 RX ADMIN — POTASSIUM CHLORIDE 10 MEQ: 10 INJECTION, SOLUTION INTRAVENOUS at 04:07

## 2019-01-01 RX ADMIN — ASPIRIN 81 MG CHEWABLE TABLET 81 MG: 81 TABLET CHEWABLE at 10:07

## 2019-01-01 RX ADMIN — TRAZODONE HYDROCHLORIDE 100 MG: 50 TABLET ORAL at 09:07

## 2019-01-01 RX ADMIN — TAMSULOSIN HYDROCHLORIDE 0.4 MG: 0.4 CAPSULE ORAL at 04:07

## 2019-01-01 RX ADMIN — POTASSIUM CHLORIDE 10 MEQ: 10 INJECTION, SOLUTION INTRAVENOUS at 03:07

## 2019-01-01 RX ADMIN — HEPARIN SODIUM 5000 UNITS: 5000 INJECTION, SOLUTION INTRAVENOUS; SUBCUTANEOUS at 03:04

## 2019-01-01 RX ADMIN — ALBUMIN HUMAN 25 G: 50 SOLUTION INTRAVENOUS at 05:07

## 2019-01-01 RX ADMIN — ASPIRIN 81 MG: 81 TABLET, COATED ORAL at 09:04

## 2019-01-01 RX ADMIN — CLOPIDOGREL 75 MG: 75 TABLET, FILM COATED ORAL at 11:04

## 2019-01-01 RX ADMIN — Medication 25 MCG/HR: at 12:07

## 2019-01-01 RX ADMIN — DEXMEDETOMIDINE HYDROCHLORIDE 1 MCG/KG/HR: 4 INJECTION, SOLUTION INTRAVENOUS at 06:07

## 2019-01-01 RX ADMIN — FUROSEMIDE 40 MG: 10 INJECTION, SOLUTION INTRAMUSCULAR; INTRAVENOUS at 09:07

## 2019-01-01 RX ADMIN — CHLORHEXIDINE GLUCONATE 0.12% ORAL RINSE 15 ML: 1.2 LIQUID ORAL at 09:07

## 2019-01-01 RX ADMIN — POLYETHYLENE GLYCOL 3350 17 G: 17 POWDER, FOR SOLUTION ORAL at 09:04

## 2019-01-01 RX ADMIN — METOPROLOL TARTRATE 5 MG: 5 INJECTION INTRAVENOUS at 07:07

## 2019-01-01 RX ADMIN — CARVEDILOL 6.25 MG: 6.25 TABLET, FILM COATED ORAL at 09:07

## 2019-01-01 RX ADMIN — TAMSULOSIN HYDROCHLORIDE 0.4 MG: 0.4 CAPSULE ORAL at 11:04

## 2019-01-01 RX ADMIN — HEPARIN SODIUM 5000 UNITS: 5000 INJECTION, SOLUTION INTRAVENOUS; SUBCUTANEOUS at 06:07

## 2019-01-01 RX ADMIN — ESMOLOL HYDROCHLORIDE 150 MCG/KG/MIN: 20 INJECTION INTRAVENOUS at 08:07

## 2019-01-01 RX ADMIN — POTASSIUM CHLORIDE 10 MEQ: 10 INJECTION, SOLUTION INTRAVENOUS at 01:07

## 2019-01-01 RX ADMIN — LISINOPRIL 2.5 MG: 2.5 TABLET ORAL at 10:04

## 2019-01-01 RX ADMIN — HALOPERIDOL LACTATE 5 MG: 5 INJECTION, SOLUTION INTRAMUSCULAR at 10:07

## 2019-01-01 RX ADMIN — CHLORDIAZEPOXIDE HYDROCHLORIDE 15 MG: 5 CAPSULE ORAL at 09:07

## 2019-01-01 RX ADMIN — HYDRALAZINE HYDROCHLORIDE 10 MG: 20 INJECTION INTRAMUSCULAR; INTRAVENOUS at 12:07

## 2019-01-01 RX ADMIN — PROPOFOL 40 MCG/KG/MIN: 10 INJECTION, EMULSION INTRAVENOUS at 03:07

## 2019-01-01 RX ADMIN — TIOTROPIUM BROMIDE 18 MCG: 18 CAPSULE ORAL; RESPIRATORY (INHALATION) at 11:04

## 2019-01-01 RX ADMIN — ASPIRIN 81 MG: 81 TABLET, COATED ORAL at 08:04

## 2019-01-01 RX ADMIN — MAGNESIUM SULFATE IN WATER 2 G: 40 INJECTION, SOLUTION INTRAVENOUS at 07:07

## 2019-01-01 RX ADMIN — PROPOFOL 50 MCG/KG/MIN: 10 INJECTION, EMULSION INTRAVENOUS at 11:07

## 2019-01-01 RX ADMIN — HALOPERIDOL LACTATE 5 MG: 5 INJECTION, SOLUTION INTRAMUSCULAR at 08:07

## 2019-01-01 RX ADMIN — HALOPERIDOL LACTATE 5 MG: 5 INJECTION, SOLUTION INTRAMUSCULAR at 01:07

## 2019-01-01 RX ADMIN — CLOPIDOGREL 75 MG: 75 TABLET, FILM COATED ORAL at 09:04

## 2019-01-01 RX ADMIN — ATORVASTATIN CALCIUM 80 MG: 20 TABLET, FILM COATED ORAL at 11:04

## 2019-01-01 RX ADMIN — HEPARIN SODIUM 5000 UNITS: 5000 INJECTION, SOLUTION INTRAVENOUS; SUBCUTANEOUS at 06:04

## 2019-01-01 RX ADMIN — THERA TABS 1 TABLET: TAB at 08:07

## 2019-01-01 RX ADMIN — FUROSEMIDE 20 MG: 20 TABLET ORAL at 10:07

## 2019-01-01 RX ADMIN — Medication 100 MG: at 10:07

## 2019-01-01 RX ADMIN — FUROSEMIDE 20 MG: 20 TABLET ORAL at 08:07

## 2019-01-01 RX ADMIN — TAMSULOSIN HYDROCHLORIDE 0.8 MG: 0.4 CAPSULE ORAL at 09:07

## 2019-01-01 RX ADMIN — AMOXICILLIN AND CLAVULANATE POTASSIUM 1 TABLET: 875; 125 TABLET, FILM COATED ORAL at 03:07

## 2019-01-01 RX ADMIN — TIOTROPIUM BROMIDE 18 MCG: 18 CAPSULE ORAL; RESPIRATORY (INHALATION) at 09:04

## 2019-01-01 RX ADMIN — CHLORDIAZEPOXIDE HYDROCHLORIDE 5 MG: 5 CAPSULE ORAL at 04:07

## 2019-01-01 RX ADMIN — VANCOMYCIN HYDROCHLORIDE 1250 MG: 1.25 INJECTION, POWDER, LYOPHILIZED, FOR SOLUTION INTRAVENOUS at 05:07

## 2019-01-01 RX ADMIN — CHLORDIAZEPOXIDE HYDROCHLORIDE 10 MG: 5 CAPSULE ORAL at 06:07

## 2019-01-01 RX ADMIN — IPRATROPIUM BROMIDE AND ALBUTEROL SULFATE 3 ML: .5; 3 SOLUTION RESPIRATORY (INHALATION) at 09:07

## 2019-01-01 RX ADMIN — FOLIC ACID 1 MG: 1 TABLET ORAL at 08:07

## 2019-01-01 RX ADMIN — MAGNESIUM SULFATE IN WATER 2 G: 40 INJECTION, SOLUTION INTRAVENOUS at 12:07

## 2019-01-01 RX ADMIN — PHENYLEPHRINE HYDROCHLORIDE 50 MCG: 10 INJECTION INTRAVENOUS at 08:07

## 2019-01-01 RX ADMIN — HALOPERIDOL LACTATE 5 MG: 5 INJECTION, SOLUTION INTRAMUSCULAR at 02:07

## 2019-01-01 RX ADMIN — HEPARIN SODIUM 6000 UNITS: 1000 INJECTION, SOLUTION INTRAVENOUS; SUBCUTANEOUS at 09:07

## 2019-01-01 RX ADMIN — Medication 1000 MG: at 08:07

## 2019-01-01 RX ADMIN — POLYETHYLENE GLYCOL 3350 17 G: 17 POWDER, FOR SOLUTION ORAL at 11:04

## 2019-01-01 RX ADMIN — SODIUM CHLORIDE, SODIUM LACTATE, POTASSIUM CHLORIDE, AND CALCIUM CHLORIDE: .6; .31; .03; .02 INJECTION, SOLUTION INTRAVENOUS at 09:07

## 2019-01-01 RX ADMIN — ASPIRIN 325 MG ORAL TABLET 325 MG: 325 PILL ORAL at 02:07

## 2019-01-01 RX ADMIN — DEXTROSE 125 ML: 10 SOLUTION INTRAVENOUS at 09:07

## 2019-01-01 RX ADMIN — CHLORDIAZEPOXIDE HYDROCHLORIDE 10 MG: 5 CAPSULE ORAL at 03:07

## 2019-01-01 RX ADMIN — SULFAMETHOXAZOLE AND TRIMETHOPRIM 1 TABLET: 800; 160 TABLET ORAL at 09:07

## 2019-01-01 RX ADMIN — HYDROCODONE BITARTRATE AND ACETAMINOPHEN 1 TABLET: 10; 325 TABLET ORAL at 01:07

## 2019-01-01 RX ADMIN — FOLIC ACID 1 MG: 1 TABLET ORAL at 10:07

## 2019-01-01 RX ADMIN — DEXMEDETOMIDINE HYDROCHLORIDE 0.4 MCG/KG/HR: 4 INJECTION, SOLUTION INTRAVENOUS at 01:07

## 2019-01-01 RX ADMIN — DOBUTAMINE IN DEXTROSE 5 MCG/KG/MIN: 200 INJECTION, SOLUTION INTRAVENOUS at 02:07

## 2019-01-01 RX ADMIN — LORAZEPAM 0.25 MG: 2 INJECTION, SOLUTION INTRAMUSCULAR; INTRAVENOUS at 01:07

## 2019-01-01 RX ADMIN — FUROSEMIDE 40 MG: 10 INJECTION, SOLUTION INTRAMUSCULAR; INTRAVENOUS at 10:04

## 2019-01-01 RX ADMIN — PHENYLEPHRINE HYDROCHLORIDE 100 MCG: 10 INJECTION INTRAVENOUS at 08:07

## 2019-01-01 RX ADMIN — LACTULOSE 30 G: 20 SOLUTION ORAL at 11:04

## 2019-01-01 RX ADMIN — CEFAZOLIN 2 G: 1 INJECTION, POWDER, FOR SOLUTION INTRAMUSCULAR; INTRAVENOUS at 12:07

## 2019-01-01 RX ADMIN — DEXMEDETOMIDINE HYDROCHLORIDE 1.4 MCG/KG/HR: 4 INJECTION, SOLUTION INTRAVENOUS at 03:07

## 2019-01-01 RX ADMIN — AMLODIPINE BESYLATE 5 MG: 5 TABLET ORAL at 02:07

## 2019-01-01 RX ADMIN — HYDROCODONE BITARTRATE AND ACETAMINOPHEN 1 TABLET: 5; 325 TABLET ORAL at 05:07

## 2019-01-01 RX ADMIN — HYDRALAZINE HYDROCHLORIDE 10 MG: 20 INJECTION INTRAMUSCULAR; INTRAVENOUS at 10:07

## 2019-01-01 RX ADMIN — LACTULOSE 30 G: 20 SOLUTION ORAL at 06:04

## 2019-01-01 RX ADMIN — DOBUTAMINE IN DEXTROSE 2.5 MCG/KG/MIN: 200 INJECTION, SOLUTION INTRAVENOUS at 05:07

## 2019-01-01 RX ADMIN — CARVEDILOL 3.12 MG: 3.12 TABLET, FILM COATED ORAL at 11:04

## 2019-01-01 RX ADMIN — HEPARIN SODIUM 5000 UNITS: 5000 INJECTION, SOLUTION INTRAVENOUS; SUBCUTANEOUS at 02:04

## 2019-01-01 RX ADMIN — SODIUM CHLORIDE 0.25 MCG/KG/MIN: 9 INJECTION, SOLUTION INTRAVENOUS at 08:07

## 2019-01-01 RX ADMIN — DOXAZOSIN 4 MG: 4 TABLET ORAL at 08:07

## 2019-01-01 RX ADMIN — ASPIRIN 81 MG: 81 TABLET, COATED ORAL at 11:04

## 2019-01-01 RX ADMIN — THERA TABS 1 TABLET: TAB at 10:07

## 2019-01-01 RX ADMIN — SODIUM CHLORIDE, SODIUM LACTATE, POTASSIUM CHLORIDE, AND CALCIUM CHLORIDE: .6; .31; .03; .02 INJECTION, SOLUTION INTRAVENOUS at 11:07

## 2019-01-01 RX ADMIN — TAMSULOSIN HYDROCHLORIDE 0.4 MG: 0.4 CAPSULE ORAL at 08:04

## 2019-01-01 RX ADMIN — FENTANYL CITRATE 50 MCG: 50 INJECTION INTRAMUSCULAR; INTRAVENOUS at 08:07

## 2019-01-01 RX ADMIN — DEXMEDETOMIDINE HYDROCHLORIDE 0.2 MCG/KG/HR: 4 INJECTION, SOLUTION INTRAVENOUS at 11:07

## 2019-01-01 RX ADMIN — LOSARTAN POTASSIUM 12.5 MG: 25 TABLET, FILM COATED ORAL at 09:07

## 2019-01-01 RX ADMIN — METOPROLOL TARTRATE 5 MG: 5 INJECTION INTRAVENOUS at 01:07

## 2019-01-01 RX ADMIN — POTASSIUM CHLORIDE 10 MEQ: 10 INJECTION, SOLUTION INTRAVENOUS at 06:07

## 2019-01-01 RX ADMIN — FUROSEMIDE 40 MG: 10 INJECTION, SOLUTION INTRAMUSCULAR; INTRAVENOUS at 09:04

## 2019-01-01 RX ADMIN — SENNOSIDES 8.6 MG: 8.6 TABLET, FILM COATED ORAL at 11:04

## 2019-01-01 RX ADMIN — DOXAZOSIN MESYLATE 4 MG: 2 TABLET ORAL at 10:07

## 2019-01-01 RX ADMIN — DEXMEDETOMIDINE HYDROCHLORIDE 0.4 MCG/KG/HR: 4 INJECTION, SOLUTION INTRAVENOUS at 09:07

## 2019-01-01 RX ADMIN — FUROSEMIDE 40 MG: 10 INJECTION, SOLUTION INTRAMUSCULAR; INTRAVENOUS at 06:07

## 2019-01-01 RX ADMIN — ROCURONIUM BROMIDE 40 MG: 10 INJECTION, SOLUTION INTRAVENOUS at 08:07

## 2019-01-01 RX ADMIN — NICOTINE 1 PATCH: 21 PATCH, EXTENDED RELEASE TRANSDERMAL at 08:07

## 2019-01-01 RX ADMIN — CEFAZOLIN 2 G: 330 INJECTION, POWDER, FOR SOLUTION INTRAMUSCULAR; INTRAVENOUS at 08:07

## 2019-01-01 RX ADMIN — HEPARIN SODIUM 5000 UNITS: 5000 INJECTION, SOLUTION INTRAVENOUS; SUBCUTANEOUS at 03:07

## 2019-01-01 RX ADMIN — ACETAMINOPHEN 650 MG: 325 TABLET ORAL at 05:07

## 2019-01-01 RX ADMIN — POTASSIUM CHLORIDE 40 MEQ: 10 INJECTION, SOLUTION INTRAVENOUS at 03:07

## 2019-01-01 RX ADMIN — RAMELTEON 8 MG: 8 TABLET, FILM COATED ORAL at 10:04

## 2019-01-01 RX ADMIN — VANCOMYCIN HYDROCHLORIDE 1000 MG: 1 INJECTION, POWDER, LYOPHILIZED, FOR SOLUTION INTRAVENOUS at 05:07

## 2019-01-01 RX ADMIN — METOPROLOL TARTRATE 5 MG: 5 INJECTION INTRAVENOUS at 06:07

## 2019-01-01 RX ADMIN — HEPARIN SODIUM AND DEXTROSE 12 UNITS/KG/HR: 10000; 5 INJECTION INTRAVENOUS at 06:07

## 2019-01-01 RX ADMIN — SODIUM CHLORIDE 500 ML: 0.9 INJECTION, SOLUTION INTRAVENOUS at 03:07

## 2019-01-01 RX ADMIN — CEFEPIME 1 G: 1 INJECTION, POWDER, FOR SOLUTION INTRAMUSCULAR; INTRAVENOUS at 06:07

## 2019-01-01 RX ADMIN — POTASSIUM CHLORIDE 40 MEQ: 20 SOLUTION ORAL at 12:07

## 2019-01-01 RX ADMIN — METOPROLOL TARTRATE 5 MG: 5 INJECTION INTRAVENOUS at 09:07

## 2019-01-01 RX ADMIN — METOPROLOL TARTRATE 5 MG: 1 INJECTION, SOLUTION INTRAVENOUS at 05:07

## 2019-01-01 RX ADMIN — SODIUM CHLORIDE, SODIUM LACTATE, POTASSIUM CHLORIDE, AND CALCIUM CHLORIDE 500 ML: .6; .31; .03; .02 INJECTION, SOLUTION INTRAVENOUS at 08:07

## 2019-01-01 RX ADMIN — FUROSEMIDE 20 MG: 20 TABLET ORAL at 09:07

## 2019-01-01 RX ADMIN — SENNOSIDES 8.6 MG: 8.6 TABLET, FILM COATED ORAL at 09:04

## 2019-01-01 RX ADMIN — CARVEDILOL 3.12 MG: 3.12 TABLET, FILM COATED ORAL at 08:07

## 2019-01-01 RX ADMIN — FUROSEMIDE 40 MG: 40 TABLET ORAL at 10:04

## 2019-01-01 RX ADMIN — ROCURONIUM BROMIDE 10 MG: 10 INJECTION, SOLUTION INTRAVENOUS at 09:07

## 2019-01-01 RX ADMIN — PROTAMINE SULFATE 35 MG: 10 INJECTION, SOLUTION INTRAVENOUS at 10:07

## 2019-01-01 RX ADMIN — CARVEDILOL 6.25 MG: 6.25 TABLET, FILM COATED ORAL at 08:07

## 2019-01-01 RX ADMIN — CHLORDIAZEPOXIDE HYDROCHLORIDE 5 MG: 5 CAPSULE ORAL at 08:07

## 2019-03-07 PROBLEM — I25.2 HISTORY OF MI (MYOCARDIAL INFARCTION): Status: ACTIVE | Noted: 2019-01-01

## 2019-03-07 PROBLEM — I50.9 ACUTE EXACERBATION OF CHF (CONGESTIVE HEART FAILURE): Status: ACTIVE | Noted: 2019-01-01

## 2019-03-07 PROBLEM — I73.9 PERIPHERAL VASCULAR DISEASE: Status: ACTIVE | Noted: 2019-01-01

## 2019-03-08 PROBLEM — N17.9 AKI (ACUTE KIDNEY INJURY): Status: ACTIVE | Noted: 2019-01-01

## 2019-03-08 PROBLEM — I50.43 ACUTE ON CHRONIC COMBINED SYSTOLIC (CONGESTIVE) AND DIASTOLIC (CONGESTIVE) HEART FAILURE: Status: ACTIVE | Noted: 2019-01-01

## 2019-03-08 PROBLEM — R39.198 DIFFICULTY URINATING: Status: ACTIVE | Noted: 2019-01-01

## 2019-03-08 PROBLEM — R79.89 ELEVATED TROPONIN: Status: ACTIVE | Noted: 2019-01-01

## 2019-03-09 PROBLEM — E87.6 HYPOKALEMIA: Status: ACTIVE | Noted: 2019-01-01

## 2019-03-10 PROBLEM — R09.02 HYPOXIA: Status: ACTIVE | Noted: 2019-01-01

## 2019-03-11 PROBLEM — E87.6 HYPOKALEMIA: Status: RESOLVED | Noted: 2019-01-01 | Resolved: 2019-01-01

## 2019-03-11 PROBLEM — R09.02 HYPOXIA: Status: RESOLVED | Noted: 2019-01-01 | Resolved: 2019-01-01

## 2019-03-26 PROBLEM — I50.9 CHF (CONGESTIVE HEART FAILURE): Status: ACTIVE | Noted: 2019-01-01

## 2019-03-28 PROBLEM — I50.43 ACUTE ON CHRONIC COMBINED SYSTOLIC (CONGESTIVE) AND DIASTOLIC (CONGESTIVE) HEART FAILURE: Status: RESOLVED | Noted: 2019-01-01 | Resolved: 2019-01-01

## 2019-04-05 PROBLEM — D69.6 THROMBOCYTOPENIA: Status: ACTIVE | Noted: 2019-01-01

## 2019-04-05 PROBLEM — J96.21 ACUTE ON CHRONIC RESPIRATORY FAILURE WITH HYPOXIA AND HYPERCAPNIA: Status: ACTIVE | Noted: 2019-01-01

## 2019-04-05 PROBLEM — K59.00 CONSTIPATION: Status: ACTIVE | Noted: 2019-01-01

## 2019-04-05 PROBLEM — I50.43 ACUTE ON CHRONIC COMBINED SYSTOLIC AND DIASTOLIC CONGESTIVE HEART FAILURE: Status: ACTIVE | Noted: 2019-01-01

## 2019-04-05 PROBLEM — I25.10 CAD (CORONARY ARTERY DISEASE): Status: ACTIVE | Noted: 2019-01-01

## 2019-04-05 PROBLEM — J44.9 COPD (CHRONIC OBSTRUCTIVE PULMONARY DISEASE): Status: ACTIVE | Noted: 2019-01-01

## 2019-04-05 PROBLEM — I13.0 BENIGN HYPERTENSIVE HEART AND KIDNEY DISEASE WITH HF AND CKD: Status: ACTIVE | Noted: 2019-01-01

## 2019-04-05 PROBLEM — J96.22 ACUTE ON CHRONIC RESPIRATORY FAILURE WITH HYPOXIA AND HYPERCAPNIA: Status: ACTIVE | Noted: 2019-01-01

## 2019-04-05 PROBLEM — R19.5 ABNORMAL FINDINGS IN STOOL: Status: ACTIVE | Noted: 2019-01-01

## 2019-04-05 PROBLEM — N18.30 CKD (CHRONIC KIDNEY DISEASE) STAGE 3, GFR 30-59 ML/MIN: Status: ACTIVE | Noted: 2019-01-01

## 2019-04-05 PROBLEM — I71.40 AAA (ABDOMINAL AORTIC ANEURYSM) WITHOUT RUPTURE: Status: ACTIVE | Noted: 2019-01-01

## 2019-04-05 PROBLEM — J96.01 ACUTE RESPIRATORY FAILURE WITH HYPOXIA: Status: ACTIVE | Noted: 2019-01-01

## 2019-04-05 PROBLEM — N40.0 BPH (BENIGN PROSTATIC HYPERPLASIA): Status: ACTIVE | Noted: 2019-01-01

## 2019-04-05 PROBLEM — I71.43 ANEURYSM OF INFRARENAL ABDOMINAL AORTA: Status: ACTIVE | Noted: 2019-01-01

## 2019-04-05 NOTE — ED TRIAGE NOTES
Yogesh Lima, a 63 y.o. male presents to the ED via [facility] for possible AAA and CHF/SOB    Triage note:  Chief Complaint   Patient presents with    Transfer     Pt presents to ED via EMS as a transfer from [place] for an AAA. Pt VSS, AAOx4.     AAA     Review of patient's allergies indicates:  No Known Allergies  Past Medical History:   Diagnosis Date    Coronary artery disease     Hyperlipidemia     Hypertension      LOC: Patient name and date of birth verified. The patient is awake, alert and aware of environment with an appropriate affect, the patient is oriented x 3 and speaking appropriately.   APPEARANCE: Patient resting comfortably, patient is clean and well groomed, patient's clothing is properly fastened.  SKIN: The skin is warm and dry, color consistent with ethnicity, patient has normal skin turgor and moist mucus membranes, skin intact, no breakdown or bruising noted.  MUSCULOSKELETAL: Patient moving all extremities well, no obvious swelling or deformities noted.   RESPIRATORY: Respirations are spontaneous, patient has a normal effort and rate, no accessory muscle use noted. No SOB reported by patient at this time.   CARDIAC: Patient has tachycardia, no periphreal edema noted, capillary refill < 3 seconds. No CP reported by patient.   ABDOMEN: Soft and non tender to palpation, no distention noted. Bowel sounds present in all four quadrants. No abdominal pain reported by patient.   NEUROLOGIC: Eyes open spontaneously, behavior appropriate to situation, follows commands, facial expression symmetrical, bilateral hand grasp equal and even, purposeful motor response noted, normal sensation in all extremities when touched with a finger.

## 2019-04-05 NOTE — ED NOTES
Pt resting in stretcher side rails up x 2 no complaints at this time.  Will continue to monitor patient.

## 2019-04-05 NOTE — ED PROVIDER NOTES
"Encounter Date: 4/5/2019       History     Chief Complaint   Patient presents with    Transfer     Pt presents to ED via EMS as a transfer from [place] for an AAA. Pt VSS, AAOx4.     AAA     Patient is a 63-year-old male with a past medical history significant for HTN, CAD, hyperlipidemia, COPD, current smoker, CKD, and combined systolic and diastolic heart failure, who is an ER to ER transfer from [facility] for an emergent evaluation by vascular surgery. He went to the ER there this morning with a chief complaint of SOB and exertional dyspnea. He states that he is unable to walk more than 2 steps without becoming completely out of breath. In addition to SOB, he expressed secondary complaints of abdominal pain, constipation, and urine retention, which prompted a CT scan of his abdomen and revealed an abdominal aortic aneurysm.     The CT report states: "There is a 5 x 4.8 cm infrarenal abdominal aortic aneurysm. There is extensive plaque and thrombus throughout the abdominal aorta which extends into the iliac arteries. This aneurysm extends over a craniocaudad length of 7.6 cm and terminates at the aortic bifurcation.  There is apparent high-grade narrowing at the origin of the left common. There is fusiform dilatation of both the right and left common iliac arteries measuring 2.2 and 1.7 cm in diameter respectively."         Review of patient's allergies indicates:  No Known Allergies  Past Medical History:   Diagnosis Date    Coronary artery disease     Hyperlipidemia     Hypertension      History reviewed. No pertinent surgical history.  Family History   Problem Relation Age of Onset    No Known Problems Mother     No Known Problems Father      Social History     Tobacco Use    Smoking status: Current Every Day Smoker     Packs/day: 1.00     Years: 53.00     Pack years: 53.00     Types: Cigarettes    Smokeless tobacco: Never Used   Substance Use Topics    Alcohol use: Not on file    Drug " use: Not on file     Review of Systems   Constitutional: Negative for activity change, appetite change, chills, diaphoresis and fever.   HENT: Negative for congestion.    Eyes: Negative for pain and visual disturbance.   Respiratory: Negative for chest tightness and shortness of breath.    Cardiovascular: Positive for leg swelling. Negative for chest pain.   Gastrointestinal: Positive for abdominal pain and constipation. Negative for abdominal distention, diarrhea, nausea and vomiting.   Genitourinary: Positive for decreased urine volume and difficulty urinating. Negative for dysuria, flank pain, frequency, hematuria, penile pain, scrotal swelling and testicular pain.   Musculoskeletal: Positive for gait problem. Negative for arthralgias, myalgias and neck pain.   Skin: Negative for rash.   Neurological: Negative for dizziness, syncope, facial asymmetry, weakness, light-headedness, numbness and headaches.   Psychiatric/Behavioral: Negative for confusion.       Physical Exam     Initial Vitals [04/05/19 1619]   BP Pulse Resp Temp SpO2   (S) 127/79 76 (S) (!) 24 98 °F (36.7 °C) 99 %      MAP       --         Physical Exam    Nursing note and vitals reviewed.  Constitutional: He is not diaphoretic. No distress.   He is alert and interactive    HENT:   Head: Normocephalic.   Extensive dental decay. Patent airway.    Eyes: Conjunctivae are normal. Right eye exhibits no discharge. Left eye exhibits no discharge. No scleral icterus.   Neck: Neck supple. JVD present.   Cardiovascular: Intact distal pulses.   Tachycardia      Pulmonary/Chest:   Speech dyspnea. Mild tachypnea. Infrequent cough.    Abdominal: Soft. He exhibits no distension.   Mild LLQ tenderness.    Musculoskeletal: Normal range of motion.   2+ Pre-tibial and pedal edema bilaterally.    Neurological: He is alert and oriented to person, place, and time.   No facial droop. No dysarthria.    Skin: Skin is warm and dry.   Psychiatric: He has a normal mood and  affect.         ED Course   Procedures  Labs Reviewed   B-TYPE NATRIURETIC PEPTIDE - Abnormal; Notable for the following components:       Result Value    BNP 2,198 (*)     All other components within normal limits   ISTAT PROCEDURE - Abnormal; Notable for the following components:    POC PCO2 48.2 (*)     POC PO2 22 (*)     POC HCO3 30.8 (*)     POC SATURATED O2 37 (*)     POC TCO2 32 (*)     All other components within normal limits   CEA   TROPONIN I   TROPONIN I    Narrative:     ADD ON #[id number] PER DR. MARGARITA MARTÍNEZ @  04/05/2019  21:35           Imaging Results    None          Medical Decision Making:   History:   Old Medical Records: I decided to obtain old medical records.  Old Records Summarized: records from previous admission(s) and records from another hospital.  Initial Assessment:   Pt is an ER to ER transfer, sent her for an emergent vascular surgery evaluation due to an AAA. Pt presented with c/o severe exertional dyspnea, urine retention, and abdominal pain.   Differential Diagnosis:   Aneurysm requiring surgery, Aneurysm requiring monitoring, Arteriosclerosis, PAD, COPD, CHF, ACS, Pneumonia, Infection, BPH, Kidney stone, PE, Constipation, mesenteric ischemia, etc   Clinical Tests:   Lab Tests: Reviewed  Radiological Study: Reviewed  Medical Tests: Reviewed  ED Management:  I discussed the case in detail with the ER attending physician   Vascular surgery evaluated patient, states that emergent surgery is not indicated and plan for AAA is to re-assess in 3 months   I discussed the case with cardiology due to acute on chronic CHF and acute SIM; states that they will see pt and to admit to medicine   I discussed the , states inpatient status   I discussed the case in detail with medicine, will admit                Attending Attestation:     Physician Attestation Statement for NP/PA:   I have conducted a face to face encounter with this patient in addition to the NP/PA, due to Medical  Complexity    Other NP/PA Attestation Additions:    History of Present Illness: 63M with PMH COPD, CKD, HTN, CAD, s/dCHF, presenting as txf from OSH for vascular surgery of AAA. Pt initially presented to OSH today with c/o SIM, however while being worked up for SOB pt also c/o abdominal pain, constipation, and urinary retention, with CT showing 63-year-old male with a past medical history significant for HTN, CAD, hyperlipidemia, COPD, current smoker, CKD, and combined systolic and diastolic heart failure, who is an ER to ER transfer from [facility] for an emergent evaluation by vascular surgery. He went to the ER there this morning with a chief complaint of SOB and exertional dyspnea. He states that he is unable to walk more than 2 steps without becoming completely out of breath. In addition to SOB, he expressed secondary complaints of abdominal pain, constipation, and urine retention, which prompted a CT scan of his abdomen and revealed a 5 x 4.8 cm infrarenal abdominal aortic aneurysm with plaque and thrombus. Pt currently endorses left-sided abdominal pain, reports SOB has improved, denies back pain, CP, numbness/tingling or weakness in extremities.    Physical Exam: Alert and oriented, vital signs stable, globally weakened, breath sounds clear, 2+ pitting edema bilateral lower extremities, decreased bilateral pedal pulses, abdomen soft and nontender without pulsatile mass appreciated   Medical Decision Making: Pt evaluated by Vascular Surgery, they do not feel the infrarenal AAA requires emergent surgery. As pt initially presented for SOB/SIM and urinary retention, with BNP elevated to 2200, will admit for CHF exacerbation.     Attending Note:  Physician Attestation Statement: I have personally seen and examined this patient. As the supervising MD I agree with the above history. As the supervising MD I agree with the above PE. As the supervising MD I agree with the above treatment, course, plan, and  disposition.                       Clinical Impression:       ICD-10-CM ICD-9-CM   1. Acute on chronic congestive heart failure, unspecified heart failure type I50.9 428.0   2. Abdominal aortic aneurysm (AAA) without rupture I71.4 441.4   3. Abdominal pain, unspecified abdominal location R10.9 789.00   4. Acute retention of urine R33.8 788.29   5. Exertional dyspnea R06.09 786.09   6. Tobacco use Z72.0 305.1   7. Heart failure I50.9 428.9   8. AAA (abdominal aortic aneurysm) without rupture I71.4 441.4         Disposition:   Disposition: Admitted  Condition: Trang Coombs PA-C  04/05/19 1859       Jess Leon MD  04/08/19 2036

## 2019-04-05 NOTE — ED NOTES
Report received from Noa MCLAUGHLIN RN. Care assumed. Pt resting comfortably and independently repositioned in stretcher with bed locked in lowest position for safety. NAD. Respirations even and unlabored and visible chest rise noted. Patient offered bathroom assistance and denies need at this time. Pt instructed to call if assistance is needed.. No needs at this time. Will continue to monitor. Call light within reach. Family at bedside

## 2019-04-06 NOTE — PLAN OF CARE
Problem: Adult Inpatient Plan of Care  Goal: Plan of Care Review  Outcome: Ongoing (interventions implemented as appropriate)  Patient in bed is able to convey needs.placed on 2L nc due to difficulty breathing O2 sat 95% with oxygen. Ambulate with minimal assist. Mari catheter care provided tubing patent.

## 2019-04-06 NOTE — PROGRESS NOTES
"[facility] Hospital Medicine  Progress Note    Patient Name: Yogesh Lima  MRN: [id number]  Patient Class: IP- Inpatient   Admission Date: 4/5/2019  Length of Stay: 1 days  Attending Physician: Juwan Ly MD  Primary Care Provider: [facility] Medicine Team: [facility]OS[facility] MED G Juwan Shetty MD    Subjective:     Principal Problem:Acute on chronic combined systolic and diastolic congestive heart failure    HPI:  Mr. Yogesh Lima is a 63 y.o. male with CAD s/p CABG, chronic combined systolic and diastolic heart failure, PVD, and chronic respiratory failure 2/2 COPD who presents to [facility] as a transfer from [facility] for Vascular Surgery evaluation for an AAA.  He originally presented to the ED for evaluation of shortness of breathing.  He endorses acute on SOB that started the day prior to admission when he was lying down to go to sleep, as well as SIM.   He is unable to walk more then about 2 steps before becoming winded.  He denies any LE swelling, but does endorse some left sided chest pain that he describes as a punching sensation.  He claims to be compliant with all of his medications.  He endorses constipation, urinary retention and abdominal pain.  He reports that his stools are pencil thin.  He currently smokes about 10 cigarettes a day.     While at [place], he was found to be satting 93% on room air.  He was placed on oxygen with improvement in his saturations.  CT abdomen was performed which showed: "There is a 5 x 4.8 cm infrarenal abdominal aortic aneurysm. There is extensive plaque and thrombus throughout the abdominal aorta which extends into the iliac arteries. This aneurysm extends over a craniocaudad length of 7.6 cm and terminates at the aortic bifurcation.  There is apparent high-grade narrowing at the origin of the left common. There is fusiform dilatation of both the right and left common iliac arteries measuring 2.2 and 1.7 cm in " "diameter respectively." He was transferred to [facility] for Vascular Surgery evaluation.     Upon arrival to the ED, he was evaluated by Vascular Surgery who said no acute intervention and to follow up in clinic with a repeat ultrasound in 3 months.  He was admitted to Hospital Medicine for further management of his CHF exacerbation.    Hospital Course:  No notes on file    Interval History: No acute events overnight. Diuresing well with 2.5 L urine output  overnight. No family at bedside.     Review of Systems   Constitutional: Negative for chills and fever.   HENT: Negative for congestion.    Respiratory: Positive for shortness of breath. Negative for cough and wheezing.    Cardiovascular: Negative for chest pain and palpitations.   Gastrointestinal: Negative for abdominal distention and abdominal pain.   Musculoskeletal: Negative for arthralgias and back pain.   Neurological: Negative for dizziness and headaches.   Psychiatric/Behavioral: Negative.      Objective:     Vital Signs (Most Recent):  Temp: 98.3 °F (36.8 °C) (04/06/19 0837)  Pulse: 96 (04/06/19 0837)  Resp: 17 (04/06/19 0837)  BP: 112/80 (04/06/19 0837)  SpO2: 99 % (04/06/19 0837) Vital Signs (24h Range):  Temp:  [96.9 °F (36.1 °C)-98.3 °F (36.8 °C)] 98.3 °F (36.8 °C)  Pulse:  [] 96  Resp:  [14-29] 17  SpO2:  [92 %-100 %] 99 %  BP: ()/(62-82) 112/80     Weight: 62.6 kg (138 lb 0.1 oz)  Body mass index is 20.98 kg/m².    Intake/Output Summary (Last 24 hours) at 4/6/2019 1044  Last data filed at 4/6/2019 0600  Gross per 24 hour   Intake --   Output 2500 ml   Net -2500 ml      Physical Exam   Constitutional: He is oriented to person, place, and time. He appears well-developed and well-nourished. No distress. Nasal cannula in place.   HENT:   Head: Normocephalic and atraumatic.   Mouth/Throat: Abnormal dentition.   Eyes: Pupils are equal, round, and reactive to light. No scleral icterus.   Neck: Normal range of motion.   Cardiovascular: Normal rate, " "regular rhythm and normal heart sounds.   No murmur heard.  Pulmonary/Chest: Effort normal and breath sounds normal. No respiratory distress. He has no wheezes.   Abdominal: Soft. Bowel sounds are normal. He exhibits no distension. There is no tenderness.   Musculoskeletal: He exhibits no edema or deformity.   Neurological: He is alert and oriented to person, place, and time.   Skin: Skin is warm and dry. He is not diaphoretic. No erythema.   Psychiatric: He has a normal mood and affect. His behavior is normal. Judgment and thought content normal.       Significant Labs: All pertinent labs within the past 24 hours have been reviewed.    Significant Imaging: I have reviewed all pertinent imaging results/findings within the past 24 hours.    Assessment/Plan:      * Acute on chronic combined systolic and diastolic congestive heart failure  CHF Pathway initiated  Last 2D echo March 2019 with EF 45% and diastolic dysfunction  BNP pending and CXR with pulmonary edema  IV diuresis with Lasix 40mg IV BID and monitor response.  Goal diuresis 2-3L/day.  Home diuretic dose:  Lasix 40mg PO daily  Strict I&Os with daily weights.   Continue Aspirin 81mg PO daily  Continue Coreg 3.125mg PO BID  Continue Lisinopril 2.5mg PO daily      AAA (abdominal aortic aneurysm) without rupture  Plan as above.     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       Aneurysm of infrarenal abdominal aorta  CT Abdomen:  "There is a 5 x 4.8 cm infrarenal abdominal aortic aneurysm. There is extensive plaque and thrombus throughout the abdominal aorta which extends into the iliac arteries. This aneurysm extends over a craniocaudad length of 7.6 cm and terminates at the aortic bifurcation.  There is apparent high-grade narrowing at the origin of the left common. There is fusiform dilatation of both the right and left common iliac arteries measuring 2.2 and 1.7 cm in diameter respectively."  Vascular Surgery consulted - " No acute intervention.  F/u in 3 months in clinic with aortic ultrasound    Constipation  Cont bowel regimen       CAD (coronary artery disease)  Plan as above.       COPD (chronic obstructive pulmonary disease)  Plan as above.     BPH (benign prostatic hyperplasia)  Endorses urinary straining  Continue arreola  Continue Flomax 0.4mg PO daily            Thrombocytopenia  Chronic and stable  Monitor for bleeding    Acute on chronic respiratory failure with hypoxia and hypercapnia  Chronic from tobacco abuse  Satting 97% on 2L NC - goal sats 88-92%  Start Spiriva          CKD (chronic kidney disease) stage 3, GFR 30-59 ml/min  Lab Results   Component Value Date    CREATININE 1.3 04/06/2019     Sr Cr stable  Cont to monitor with daily labs   Avoid nephrotoxins.   Renally dose all medications   Monitor events that may lead to decreased renal perfusion (hypovolemia, hypotension, sepsis).   Monitor urine output  obstruction precipitates worsening in GFR.      Tobacco abuse  Encouraged cessation  Denies Nicotine patch            Peripheral vascular disease  Chronic and stable  Continue Aspirin 81mg PO daily  Continue Lipitor 80mg PO daily  Continue Plavix 75mg PO daily      History of MI (myocardial infarction)  Plan as above.       Mixed hyperlipidemia  Chronic and stable  Continue Lipitor 80mg PO daily      Hx of CABG  Plan as above.       Chronic ischemic heart disease, unspecified  CAD s/p CABG  History of MI  Chronic and stable but endorses some chest pain - likely 2/2 pleural effusion  Trend troponin x 3  Continue Aspirin 81mg PO daily  Continue Coreg 3.125mg PO BID  Continue Lisinopril 2.5mg PO daily  Continue Lipitor 80mg PO daily  Continue Plavix 75mg PO daily            VTE Risk Mitigation (From admission, onward)        Ordered     IP VTE LOW RISK PATIENT  Once      04/05/19 1850              Juwan Shetty MD  Department of Hospital Medicine   [facility]

## 2019-04-06 NOTE — HPI
63 y with CAD s/p CABG, CHF, and COPD and newly diagnosed 4.8 cm infrarenal AAA transferred from OSH with L sided abdominal pain x2 days. Pain is intermittent, moderate, and does not radiate. He denies back pain. He denies SOB. He reports he has never been diagnosed with an AAA before. He reports that he has been feeling constipated for several days and had difficulty voiding as well. A urinary catheter was placed at the OSH, with only slightly improvement in his symptoms.

## 2019-04-06 NOTE — CONSULTS
[facility]  Vascular Surgery  Consult Note    Inpatient consult to Vascular Surgery  Consult performed by: Greg Morse MD  Consult ordered by: Roby Coombs PA-C        Subjective:     Chief Complaint/Reason for Admission: AAA    History of Present Illness: 63 y with CAD s/p CABG, CHF, and COPD and newly diagnosed 4.8 cm infrarenal AAA transferred from OSH with L sided abdominal pain x2 days. Pain is intermittent, moderate, and does not radiate. He denies back pain. He denies SOB. He reports he has never been diagnosed with an AAA before. He reports that he has been feeling constipated for several days and had difficulty voiding as well. A urinary catheter was placed at the OSH, with only slightly improvement in his symptoms.      (Not in a hospital admission)    Review of patient's allergies indicates:  No Known Allergies    Past Medical History:   Diagnosis Date    Coronary artery disease     Hyperlipidemia     Hypertension      History reviewed. No pertinent surgical history.  Family History     Problem Relation (Age of Onset)    No Known Problems Mother, Father        Tobacco Use    Smoking status: Current Every Day Smoker     Packs/day: 1.00     Years: 53.00     Pack years: 53.00     Types: Cigarettes    Smokeless tobacco: Never Used   Substance and Sexual Activity    Alcohol use: Not on file    Drug use: Not on file    Sexual activity: Not on file     Review of Systems   All other systems reviewed and are negative.    Objective:     Vital Signs (Most Recent):  Temp: 98 °F (36.7 °C) (04/05/19 1619)  Pulse: 106 (04/05/19 2101)  Resp: (!) 29 (04/05/19 2057)  BP: 112/76 (04/05/19 2102)  SpO2: 95 % (04/05/19 2101) Vital Signs (24h Range):  Temp:  [97.7 °F (36.5 °C)-98 °F (36.7 °C)] 98 °F (36.7 °C)  Pulse:  [] 106  Resp:  [15-29] 29  SpO2:  [93 %-100 %] 95 %  BP: (112-141)/(66-90) 112/76        There is no height or weight on file to calculate BMI.    Physical Exam    Constitutional: He appears well-developed and well-nourished.   HENT:   Head: Normocephalic and atraumatic.   Eyes: Conjunctivae and EOM are normal.   Neck: Neck supple. No thyromegaly present.   Cardiovascular: Normal rate and regular rhythm.   Pulmonary/Chest: Effort normal. No respiratory distress.   Abdominal: Soft.   L sided TTP  Palpable pulsatile aortic mass  No TTP with deep palpation of aortic mass  No rebound tenderness  No back TTP   Musculoskeletal:   R fem 2+  L fem 2+       Significant Labs:  CBC:   Recent Labs   Lab 04/05/19  0806   WBC 3.89*   RBC 4.96   HGB 13.2*   HCT 40.4   *   MCV 82   MCH 26.6*   MCHC 32.7     CMP:   Recent Labs   Lab 04/05/19  0806   GLU 98   CALCIUM 9.2   ALBUMIN 3.7   PROT 7.6      K 4.7   CO2 25      BUN 21*   CREATININE 1.34   ALKPHOS 92   ALT 32   AST 38   BILITOT 0.4       Significant Diagnostics:  I have reviewed all pertinent imaging results/findings within the past 24 hours.   Infrarenal AAA measuring 4.8 cm. R GWYN 2.2 cm. L GWYN 1.6 cm. No periaortic stranding.    Assessment/Plan:     AAA (abdominal aortic aneurysm) without rupture  63 y with CAD s/p CABG, CHF, and COPD  presents with asyptomatic infrarenal 4.8 cm AAA. No back pain. No evidence of impending rupture on CT. No tenderness with palpation of AAA. Abdominal pain is unrelated to his AAA.  - stable for DC from vascular standpoint  - follow up with Dr. Shrestha in 3 months with aortic ultrasound  - abdominal pain work up per ED MD        Thank you for your consult. I will sign off. Please contact us if you have any additional questions.    Greg Morse MD  Vascular Surgery  [facility]

## 2019-04-06 NOTE — PLAN OF CARE
Problem: Adult Inpatient Plan of Care  Goal: Plan of Care Review  Outcome: Ongoing (interventions implemented as appropriate)     04/06/19 4628   Plan of Care Review   Plan of Care Reviewed With patient     Pt free of falls/trauma/injuries. Bed in low position, wheels locked, and call light within reach. Skin integrity remains unchanged. Mari care provided throughout shift. VSS and afebrile. No distress noted/reported at this time. Will continue to monitor.

## 2019-04-06 NOTE — ASSESSMENT & PLAN NOTE
CAD s/p CABG  History of MI  Chronic and stable but endorses some chest pain - likely 2/2 pleural effusion  Trend troponin x 3  Continue Aspirin 81mg PO daily  Continue Coreg 3.125mg PO BID  Continue Lisinopril 2.5mg PO daily  Continue Lipitor 80mg PO daily  Continue Plavix 75mg PO daily

## 2019-04-06 NOTE — ASSESSMENT & PLAN NOTE
"CT Abdomen:  "There is a 5 x 4.8 cm infrarenal abdominal aortic aneurysm. There is extensive plaque and thrombus throughout the abdominal aorta which extends into the iliac arteries. This aneurysm extends over a craniocaudad length of 7.6 cm and terminates at the aortic bifurcation.  There is apparent high-grade narrowing at the origin of the left common. There is fusiform dilatation of both the right and left common iliac arteries measuring 2.2 and 1.7 cm in diameter respectively."  Vascular Surgery consulted - No acute intervention.  F/u in 3 months in clinic with aortic ultrasound  "

## 2019-04-06 NOTE — SUBJECTIVE & OBJECTIVE
Interval History: No acute events overnight. Diuresing well with 2.5 L urine output  overnight. No family at bedside.     Review of Systems   Constitutional: Negative for chills and fever.   HENT: Negative for congestion.    Respiratory: Positive for shortness of breath. Negative for cough and wheezing.    Cardiovascular: Negative for chest pain and palpitations.   Gastrointestinal: Negative for abdominal distention and abdominal pain.   Musculoskeletal: Negative for arthralgias and back pain.   Neurological: Negative for dizziness and headaches.   Psychiatric/Behavioral: Negative.      Objective:     Vital Signs (Most Recent):  Temp: 98.3 °F (36.8 °C) (04/06/19 0837)  Pulse: 96 (04/06/19 0837)  Resp: 17 (04/06/19 0837)  BP: 112/80 (04/06/19 0837)  SpO2: 99 % (04/06/19 0837) Vital Signs (24h Range):  Temp:  [96.9 °F (36.1 °C)-98.3 °F (36.8 °C)] 98.3 °F (36.8 °C)  Pulse:  [] 96  Resp:  [14-29] 17  SpO2:  [92 %-100 %] 99 %  BP: ()/(62-82) 112/80     Weight: 62.6 kg (138 lb 0.1 oz)  Body mass index is 20.98 kg/m².    Intake/Output Summary (Last 24 hours) at 4/6/2019 1044  Last data filed at 4/6/2019 0600  Gross per 24 hour   Intake --   Output 2500 ml   Net -2500 ml      Physical Exam   Constitutional: He is oriented to person, place, and time. He appears well-developed and well-nourished. No distress. Nasal cannula in place.   HENT:   Head: Normocephalic and atraumatic.   Mouth/Throat: Abnormal dentition.   Eyes: Pupils are equal, round, and reactive to light. No scleral icterus.   Neck: Normal range of motion.   Cardiovascular: Normal rate, regular rhythm and normal heart sounds.   No murmur heard.  Pulmonary/Chest: Effort normal and breath sounds normal. No respiratory distress. He has no wheezes.   Abdominal: Soft. Bowel sounds are normal. He exhibits no distension. There is no tenderness.   Musculoskeletal: He exhibits no edema or deformity.   Neurological: He is alert and oriented to person, place,  and time.   Skin: Skin is warm and dry. He is not diaphoretic. No erythema.   Psychiatric: He has a normal mood and affect. His behavior is normal. Judgment and thought content normal.       Significant Labs: All pertinent labs within the past 24 hours have been reviewed.    Significant Imaging: I have reviewed all pertinent imaging results/findings within the past 24 hours.

## 2019-04-06 NOTE — ED NOTES
Telemetry Verification   Patient placed on Telemetry Box  Verified with War Room  Box # [id number]   Monitor Tech    Rate 105   Rhythm Sinus Tach

## 2019-04-06 NOTE — ED NOTES
Clarified with Dr. Mariela Fleming about giving pt lisinopril and carvedilol with pt's /78. Dr. Fleming verifies to give to pt at this time

## 2019-04-06 NOTE — ASSESSMENT & PLAN NOTE
Chronic and stable  Continue Aspirin 81mg PO daily  Continue Lipitor 80mg PO daily  Continue Plavix 75mg PO daily

## 2019-04-06 NOTE — HPI
"Mr. Yogesh Lima is a 63 y.o. male with CAD s/p CABG, chronic combined systolic and diastolic heart failure, PVD, and chronic respiratory failure 2/2 COPD who presents to [facility] [place] a [place] from [facility] for Vascular Surgery evaluation for an AAA.  He originally presented to the ED for evaluation of shortness of breathing.  He endorses acute on SOB that started the day prior to admission when he was lying down to go to sleep, as well as SIM.   He is unable to walk more then about 2 steps before becoming winded.  He denies any LE swelling, but does endorse some left sided chest pain that he describes as a punching sensation.  He claims to be compliant with all of his medications.  He endorses constipation, urinary retention and abdominal pain.  He reports that his stools are pencil thin.  He currently smokes about 10 cigarettes a day.     While at [place], he was found to be satting 93% on room air.  He was placed on oxygen with improvement in his saturations.  CT abdomen was performed which showed: "There is a 5 x 4.8 cm infrarenal abdominal aortic aneurysm. There is extensive plaque and thrombus throughout the abdominal aorta which extends into the iliac arteries. This aneurysm extends over a craniocaudad length of 7.6 cm and terminates at the aortic bifurcation.  There is apparent high-grade narrowing at the origin of the left common. There is fusiform dilatation of both the right and left common iliac arteries measuring 2.2 and 1.7 cm in diameter respectively." He was transferred to [facility] for Vascular Surgery evaluation.     Upon arrival to the ED, he was evaluated by Vascular Surgery who said no acute intervention and to follow up in clinic with a repeat ultrasound in 3 months.  He was admitted to Hospital Medicine for further management of his CHF exacerbation.  "

## 2019-04-06 NOTE — ASSESSMENT & PLAN NOTE
63 y with CAD s/p CABG, CHF, and COPD presents with asyptomatic infrarenal 4.8 cm AAA. No back pain. No evidence of impending rupture on CT. No tenderness with palpation of AAA. Abdominal pain is unrelated to his AAA.  - stable for DC from vascular standpoint  - follow up with Dr. Shrestha in 3 months with aortic ultrasound  - abdominal pain work up per ED MD

## 2019-04-06 NOTE — ASSESSMENT & PLAN NOTE
Lab Results   Component Value Date    CREATININE 1.3 04/06/2019     Sr Cr stable  Cont to monitor with daily labs   Avoid nephrotoxins.   Renally dose all medications   Monitor events that may lead to decreased renal perfusion (hypovolemia, hypotension, sepsis).   Monitor urine output  obstruction precipitates worsening in GFR.

## 2019-04-06 NOTE — H&P
"Hospital Medicine  History and Physical      Patient Name: Yogesh Lima  MRN:  [id number]  Hospital Medicine Team: [facility]OSP MED G Mariela Fleming MD  Date of Admission:  4/5/2019     Principal Problem:  Acute on chronic combined systolic and diastolic congestive heart failure   Primary Care Physician: [facility]       History of Present Illness:    Mr. Yogesh Lima is a 63 y.o. male with CAD s/p CABG, chronic combined systolic and diastolic heart failure, PVD, and chronic respiratory failure 2/2 COPD who presents to [facility] as a transfer from [facility] for Vascular Surgery evaluation for an AAA.  He originally presented to the ED for evaluation of shortness of breathing.  He endorses acute on SOB that started the day prior to admission when he was lying down to go to sleep, as well as SIM.   He is unable to walk more then about 2 steps before becoming winded.  He denies any LE swelling, but does endorse some left sided chest pain that he describes as a punching sensation.  He claims to be compliant with all of his medications.  He endorses constipation, urinary retention and abdominal pain.  He reports that his stools are pencil thin.  He currently smokes about 10 cigarettes a day.    While at [place], he was found to be satting 93% on room air.  He was placed on oxygen with improvement in his saturations.  CT abdomen was performed which showed: "There is a 5 x 4.8 cm infrarenal abdominal aortic aneurysm. There is extensive plaque and thrombus throughout the abdominal aorta which extends into the iliac arteries. This aneurysm extends over a craniocaudad length of 7.6 cm and terminates at the aortic bifurcation.  There is apparent high-grade narrowing at the origin of the left common. There is fusiform dilatation of both the right and left common iliac arteries measuring 2.2 and 1.7 cm in diameter respectively." He was transferred to [facility] for Vascular Surgery evaluation.    Upon " arrival to the ED, he was evaluated by Vascular Surgery who said no acute intervention and to follow up in clinic with a repeat ultrasound in 3 months.  He was admitted to Hospital Medicine for further management of his CHF exacerbation.    Review of Systems:  Constitutional: Negative for chills, fatigue, fever.   HENT: Negative for sore throat, trouble swallowing.    Eyes: Negative for photophobia, visual disturbance.   Respiratory: + shortness of breath.    Cardiovascular: + chest pain  Gastrointestinal: + abdominal pain, constipation  Endocrine: Negative for cold intolerance, heat intolerance.   Genitourinary: Negative for dysuria, frequency.   Musculoskeletal: Negative for arthralgias, myalgias.   Skin: Negative for rash, wound, erythema   Neurological: Negative for dizziness, syncope, weakness, light-headedness.   Psychiatric/Behavioral: Negative for confusion, hallucinations, anxiety  All other systems reviewed and are negative.      Past Medical History: Patient has a past medical history of Coronary artery disease, Hyperlipidemia, and Hypertension.      Past Surgical History: Patient has no past surgical history on file.      Social History: Patient reports that he has been smoking cigarettes.  He has a 53.00 pack-year smoking history. He has never used smokeless tobacco.      Family History: Patient's family history includes No Known Problems in his father and mother.      Medications: Scheduled Meds:   aspirin  81 mg Oral Daily    atorvastatin  80 mg Oral Daily    carvedilol  3.125 mg Oral BID    clopidogrel  75 mg Oral Daily    [START ON 4/6/2019] furosemide  40 mg Intravenous BID    lisinopril  2.5 mg Oral Daily    mirtazapine  15 mg Oral QHS    tamsulosin  0.4 mg Oral Daily     Continuous Infusions:  PRN Meds:.sodium chloride 0.9%      Allergies: Patient has No Known Allergies.      Physical Exam:    Temp:  [97.7 °F (36.5 °C)-98 °F (36.7 °C)]   Pulse:  []   Resp:  [15-26]   BP:  (117-141)/(68-90)   SpO2:  [93 %-100 %]     Constitutional: Appears chronically ill appearing and older then stated age  Head: Normocephalic and atraumatic.   Mouth/Throat: Poor dentition  Eyes: EOM are normal. Pupils are equal, round, and reactive to light. No scleral icterus.   Neck: Normal range of motion. Neck supple.   Cardiovascular: Tachycardia.  Regular heart rhythm.  No murmur  Pulmonary/Chest: Increased work of breathing.  Dyspnic.  Occasional rales  Abdominal: Soft. Bowel sounds are normal.  No distension.  TTP in LLQ  Musculoskeletal: Normal range of motion. No edema.   Neurological: Alert and oriented to person, place, and time.   Skin: Skin is warm and dry.   Psychiatric: Normal mood and affect. Behavior is normal.         Intake/Output Summary (Last 24 hours) at 4/5/2019 2022  Last data filed at 4/5/2019 2013  Gross per 24 hour   Intake --   Output 650 ml   Net -650 ml     Recent Labs   Lab 04/05/19  0806   WBC 3.89*   HGB 13.2*   HCT 40.4   *     Recent Labs   Lab 04/05/19  0806      K 4.7      CO2 25   BUN 21*   CREATININE 1.34   GLU 98   CALCIUM 9.2     Recent Labs   Lab 04/05/19  0806   ALKPHOS 92   ALT 32   AST 38   ALBUMIN 3.7   PROT 7.6   BILITOT 0.4      No results for input(s): POCTGLUCOSE in the last 168 hours.  Recent Labs     04/05/19  0806   TROPONINI 0.031       No results for input(s): LACTATE in the last 72 hours.       Assessment and Plan:    Mr. Yogesh Lima is a 63 y.o. male who presented to [facility] on 4/5/2019 with AAA.    Acute on Chronic Combined Systolic and Diastolic Heart Failure  Acute on Chronic Respiratory Failure with Hypoxia and Hypercapnia  · CHF Pathway initiated  · Last 2D echo March 2019 with EF 45% and diastolic dysfunction  · BNP pending and CXR with pulmonary edema  · IV diuresis with Lasix 40mg IV BID and monitor response.  Goal diuresis 2-3L/day.  Home diuretic dose:  Lasix 40mg PO daily  · Strict I&Os with daily weights.   · Continue Aspirin  "81mg PO daily  · Continue Coreg 3.125mg PO BID  · Continue Lisinopril 2.5mg PO daily    Chronic Ischemic Heart Disease  CAD s/p CABG  History of MI  · Chronic and stable but endorses some chest pain - likely 2/2 pleural effusion  · Trend troponin x 3  · Continue Aspirin 81mg PO daily  · Continue Coreg 3.125mg PO BID  · Continue Lisinopril 2.5mg PO daily  · Continue Lipitor 80mg PO daily  · Continue Plavix 75mg PO daily    Mixed HLD  · Chronic and stable  · Continue Lipitor 80mg PO daily    PVD   · Chronic and stable  · Continue Aspirin 81mg PO daily  · Continue Lipitor 80mg PO daily  · Continue Plavix 75mg PO daily    Tobacco Abuse   · Encouraged cessation  · Denies Nicotine patch    CKD Stage 3  · Chronic and stable  · Monitor with diuresis    Thrombocytopenia  · Chronic and stable  · Monitor for bleeding    BPH  · Endorses urinary straining  · Continue arreola  · Continue Flomax 0.4mg PO daily    COPD  Acute on Chronic Respiratory Failure with Hypoxia and Hypercapnia  · Chronic from tobacco abuse  · Satting 97% on 2L NC - goal sats 88-92%  · Start Spiriva    Constipation  · Reports pencil thin stools and constipation  · Start Miralax and Senna  · Check CEA  · Might need cscope outpatient    Aneurysm of Infrarenal Abdominal Aorta  · CT Abdomen:  "There is a 5 x 4.8 cm infrarenal abdominal aortic aneurysm. There is extensive plaque and thrombus throughout the abdominal aorta which extends into the iliac arteries. This aneurysm extends over a craniocaudad length of 7.6 cm and terminates at the aortic bifurcation.  There is apparent high-grade narrowing at the origin of the left common. There is fusiform dilatation of both the right and left common iliac arteries measuring 2.2 and 1.7 cm in diameter respectively."  · Vascular Surgery consulted - No acute intervention.  F/u in 3 months in clinic with aortic ultrasound    Benign Hypertensive Heart and Kidney Disease with HF and CKD  · As above    Diet:  Low Sodium, 1.5L " fluid restriction  GI PPx:  Not indicated  DVT PPx:  Lovenox  Goals of Care:  Full    High Risk Conditions:   Patient has a condition that poses threat to life and bodily function: Severe Respiratory Distress       Disposition:  Pending diuresis  Discharge Needs:  F/u with Vascular Surgery Dr. Shrestha in 3 months with aortic US      Mariela Fleming MD  [facility]  Cell:  [phone]  Spectra:  [phone]  Pager:  [phone]

## 2019-04-06 NOTE — ASSESSMENT & PLAN NOTE
CHF Pathway initiated  Last 2D echo March 2019 with EF 45% and diastolic dysfunction  BNP pending and CXR with pulmonary edema  IV diuresis with Lasix 40mg IV BID and monitor response.  Goal diuresis 2-3L/day.  Home diuretic dose:  Lasix 40mg PO daily  Strict I&Os with daily weights.   Continue Aspirin 81mg PO daily  Continue Coreg 3.125mg PO BID  Continue Lisinopril 2.5mg PO daily

## 2019-04-06 NOTE — SUBJECTIVE & OBJECTIVE
(Not in a hospital admission)    Review of patient's allergies indicates:  No Known Allergies    Past Medical History:   Diagnosis Date    Coronary artery disease     Hyperlipidemia     Hypertension      History reviewed. No pertinent surgical history.  Family History     Problem Relation (Age of Onset)    No Known Problems Mother, Father        Tobacco Use    Smoking status: Current Every Day Smoker     Packs/day: 1.00     Years: 53.00     Pack years: 53.00     Types: Cigarettes    Smokeless tobacco: Never Used   Substance and Sexual Activity    Alcohol use: Not on file    Drug use: Not on file    Sexual activity: Not on file     Review of Systems   All other systems reviewed and are negative.    Objective:     Vital Signs (Most Recent):  Temp: 98 °F (36.7 °C) (04/05/19 1619)  Pulse: 106 (04/05/19 2101)  Resp: (!) 29 (04/05/19 2057)  BP: 112/76 (04/05/19 2102)  SpO2: 95 % (04/05/19 2101) Vital Signs (24h Range):  Temp:  [97.7 °F (36.5 °C)-98 °F (36.7 °C)] 98 °F (36.7 °C)  Pulse:  [] 106  Resp:  [15-29] 29  SpO2:  [93 %-100 %] 95 %  BP: (112-141)/(66-90) 112/76        There is no height or weight on file to calculate BMI.    Physical Exam   Constitutional: He appears well-developed and well-nourished.   HENT:   Head: Normocephalic and atraumatic.   Eyes: Conjunctivae and EOM are normal.   Neck: Neck supple. No thyromegaly present.   Cardiovascular: Normal rate and regular rhythm.   Pulmonary/Chest: Effort normal. No respiratory distress.   Abdominal: Soft.   L sided TTP  Palpable pulsatile aortic mass  No TTP with deep palpation of aortic mass  No rebound tenderness  No back TTP   Musculoskeletal:   R fem 2+  L fem 2+       Significant Labs:  CBC:   Recent Labs   Lab 04/05/19  0806   WBC 3.89*   RBC 4.96   HGB 13.2*   HCT 40.4   *   MCV 82   MCH 26.6*   MCHC 32.7     CMP:   Recent Labs   Lab 04/05/19  0806   GLU 98   CALCIUM 9.2   ALBUMIN 3.7   PROT 7.6      K 4.7   CO2 25      BUN  21*   CREATININE 1.34   ALKPHOS 92   ALT 32   AST 38   BILITOT 0.4       Significant Diagnostics:  I have reviewed all pertinent imaging results/findings within the past 24 hours.   Infrarenal AAA measuring 4.8 cm. R GWYN 2.2 cm. L GWYN 1.6 cm. No periaortic stranding.

## 2019-04-07 NOTE — ASSESSMENT & PLAN NOTE
CHF Pathway initiated  Last 2D echo March 2019 with EF 45% and diastolic dysfunction  BNP pending and CXR with pulmonary edema  IV diuresis with Lasix 40mg IV BID with good response; will continue.  Home diuretic dose:  Lasix 40mg PO daily  Strict I&Os with daily weights.   Continue Aspirin 81mg PO daily  Continue Coreg 3.125mg PO BID  Continue Lisinopril 2.5mg PO daily

## 2019-04-07 NOTE — SUBJECTIVE & OBJECTIVE
Interval History: No acute events overnight. Diuresing well but still feels weak. Wanting to ambulate.     Review of Systems   Constitutional: Positive for fatigue. Negative for chills and fever.   HENT: Negative for congestion.    Respiratory: Positive for shortness of breath. Negative for cough and wheezing.    Cardiovascular: Negative for chest pain and palpitations.   Gastrointestinal: Negative for abdominal distention and abdominal pain.   Musculoskeletal: Negative for arthralgias and back pain.   Neurological: Negative for dizziness and headaches.   Psychiatric/Behavioral: Negative.      Objective:     Vital Signs (Most Recent):  Temp: 98 °F (36.7 °C) (04/07/19 1156)  Pulse: 94 (04/07/19 1300)  Resp: 17 (04/07/19 1300)  BP: 119/89 (04/07/19 1300)  SpO2: 96 % (04/07/19 1300) Vital Signs (24h Range):  Temp:  [97.7 °F (36.5 °C)-98.4 °F (36.9 °C)] 98 °F (36.7 °C)  Pulse:  [] 94  Resp:  [15-20] 17  SpO2:  [95 %-98 %] 96 %  BP: ()/(51-98) 119/89     Weight: 62.6 kg (138 lb 0.1 oz)  Body mass index is 20.98 kg/m².    Intake/Output Summary (Last 24 hours) at 4/7/2019 1509  Last data filed at 4/7/2019 1300  Gross per 24 hour   Intake 1545 ml   Output 3525 ml   Net -1980 ml      Physical Exam   Constitutional: He is oriented to person, place, and time. He appears well-developed and well-nourished. No distress. Nasal cannula in place.   HENT:   Head: Normocephalic and atraumatic.   Mouth/Throat: Abnormal dentition.   Eyes: Pupils are equal, round, and reactive to light. No scleral icterus.   Neck: Normal range of motion.   Cardiovascular: Normal rate, regular rhythm and normal heart sounds.   No murmur heard.  Pulmonary/Chest: Effort normal and breath sounds normal. No respiratory distress. He has no wheezes.   Diminished basilar breath sounds.    Abdominal: Soft. Bowel sounds are normal. He exhibits no distension. There is no tenderness.   Musculoskeletal: He exhibits no edema or deformity.   Neurological:  He is alert and oriented to person, place, and time.   Skin: He is not diaphoretic. No erythema.   Psychiatric: He has a normal mood and affect. His behavior is normal. Judgment and thought content normal.       Significant Labs: All pertinent labs within the past 24 hours have been reviewed.    Significant Imaging: I have reviewed all pertinent imaging results/findings within the past 24 hours.

## 2019-04-07 NOTE — ASSESSMENT & PLAN NOTE
Lab Results   Component Value Date    CREATININE 1.5 (H) 04/07/2019     Sr Cr stable  Cont to monitor with daily labs   Avoid nephrotoxins.   Renally dose all medications   Monitor events that may lead to decreased renal perfusion (hypovolemia, hypotension, sepsis).   Monitor urine output  obstruction precipitates worsening in GFR.

## 2019-04-07 NOTE — CONSULTS
[facility]  Adult Nutrition  Education Note    Diet Education: Low Sodium  Time Spent: 15 min  Learners: Pt      Nutrition Education provided with handouts: Low Sodium Nutrition Therapy      Comments: Pt states that he does not cook at home and usually just eats canned foods without any additional seasoning/cooking. Provided and explained handout detailing common high sodium foods and strategies for reducing sodium intake including label reading, choosing reduced sodium foods, and cooking tips. Pt voiced understanding. All questions and concerns answered.      Follow-Up: 1x weekly    Please re-consult as needed.

## 2019-04-07 NOTE — NURSING
AAOX2.  Patient has no c/o pain. Vital signs stable. Patient remained on bedrest.. Skin intact with slight bruising noted to bilateral lower legs.  Tele SR-ST. Strict I&O's.   Daily weight.   IV site right FA 20g cdi; saline locked.., Patient tolerated meds well and swallows whole,  1500 Fluid restriction. Patient needs assistance when ambulating. Mari cdi draining pink tinged urine.  Patient on 0.5 liters of oxygen;sats within normal limits. No edema noted.   No accuchecks.  Bed in lowest position, call light within reach, bedside table near, bed in lowest position, and bed locked.  Will continue to monitor.

## 2019-04-07 NOTE — PROGRESS NOTES
"[facility] Hospital Medicine  Progress Note    Patient Name: Yogesh Lima  MRN: [id number]  Patient Class: IP- Inpatient   Admission Date: 4/5/2019  Length of Stay: 2 days  Attending Physician: Tim Ly MD  Primary Care Provider: [facility] Medicine Team: [facility] HOSP MED G Tim Ly MD    Subjective:     Principal Problem:Acute on chronic combined systolic and diastolic congestive heart failure    HPI:  Mr. Yogesh Lima is a 63 y.o. male with CAD s/p CABG, chronic combined systolic and diastolic heart failure, PVD, and chronic respiratory failure 2/2 COPD who presents to [facility] as a transfer from [facility] for Vascular Surgery evaluation for an AAA.  He originally presented to the ED for evaluation of shortness of breathing.  He endorses acute on SOB that started the day prior to admission when he was lying down to go to sleep, as well as SIM.   He is unable to walk more then about 2 steps before becoming winded.  He denies any LE swelling, but does endorse some left sided chest pain that he describes as a punching sensation.  He claims to be compliant with all of his medications.  He endorses constipation, urinary retention and abdominal pain.  He reports that his stools are pencil thin.  He currently smokes about 10 cigarettes a day.     While at [place], he was found to be satting 93% on room air.  He was placed on oxygen with improvement in his saturations.  CT abdomen was performed which showed: "There is a 5 x 4.8 cm infrarenal abdominal aortic aneurysm. There is extensive plaque and thrombus throughout the abdominal aorta which extends into the iliac arteries. This aneurysm extends over a craniocaudad length of 7.6 cm and terminates at the aortic bifurcation.  There is apparent high-grade narrowing at the origin of the left common. There is fusiform dilatation of both the right and left common iliac arteries measuring 2.2 and 1.7 cm " "in diameter respectively." He was transferred to [facility] for Vascular Surgery evaluation.     Upon arrival to the ED, he was evaluated by Vascular Surgery who said no acute intervention and to follow up in clinic with a repeat ultrasound in 3 months.  He was admitted to Hospital Medicine for further management of his CHF exacerbation.    Hospital Course:  No notes on file    Interval History: No acute events overnight. Diuresing well but still feels weak. Wanting to ambulate.     Review of Systems   Constitutional: Positive for fatigue. Negative for chills and fever.   HENT: Negative for congestion.    Respiratory: Positive for shortness of breath. Negative for cough and wheezing.    Cardiovascular: Negative for chest pain and palpitations.   Gastrointestinal: Negative for abdominal distention and abdominal pain.   Musculoskeletal: Negative for arthralgias and back pain.   Neurological: Negative for dizziness and headaches.   Psychiatric/Behavioral: Negative.      Objective:     Vital Signs (Most Recent):  Temp: 98 °F (36.7 °C) (04/07/19 1156)  Pulse: 94 (04/07/19 1300)  Resp: 17 (04/07/19 1300)  BP: 119/89 (04/07/19 1300)  SpO2: 96 % (04/07/19 1300) Vital Signs (24h Range):  Temp:  [97.7 °F (36.5 °C)-98.4 °F (36.9 °C)] 98 °F (36.7 °C)  Pulse:  [] 94  Resp:  [15-20] 17  SpO2:  [95 %-98 %] 96 %  BP: ()/(51-98) 119/89     Weight: 62.6 kg (138 lb 0.1 oz)  Body mass index is 20.98 kg/m².    Intake/Output Summary (Last 24 hours) at 4/7/2019 1509  Last data filed at 4/7/2019 1300  Gross per 24 hour   Intake 1545 ml   Output 3525 ml   Net -1980 ml      Physical Exam   Constitutional: He is oriented to person, place, and time. He appears well-developed and well-nourished. No distress. Nasal cannula in place.   HENT:   Head: Normocephalic and atraumatic.   Mouth/Throat: Abnormal dentition.   Eyes: Pupils are equal, round, and reactive to light. No scleral icterus.   Neck: Normal range of motion.   Cardiovascular: " "Normal rate, regular rhythm and normal heart sounds.   No murmur heard.  Pulmonary/Chest: Effort normal and breath sounds normal. No respiratory distress. He has no wheezes.   Diminished basilar breath sounds.    Abdominal: Soft. Bowel sounds are normal. He exhibits no distension. There is no tenderness.   Musculoskeletal: He exhibits no edema or deformity.   Neurological: He is alert and oriented to person, place, and time.   Skin: He is not diaphoretic. No erythema.   Psychiatric: He has a normal mood and affect. His behavior is normal. Judgment and thought content normal.       Significant Labs: All pertinent labs within the past 24 hours have been reviewed.    Significant Imaging: I have reviewed all pertinent imaging results/findings within the past 24 hours.    Assessment/Plan:      * Acute on chronic combined systolic and diastolic congestive heart failure  CHF Pathway initiated  Last 2D echo March 2019 with EF 45% and diastolic dysfunction  BNP pending and CXR with pulmonary edema  IV diuresis with Lasix 40mg IV BID with good response; will continue.  Home diuretic dose:  Lasix 40mg PO daily  Strict I&Os with daily weights.   Continue Aspirin 81mg PO daily  Continue Coreg 3.125mg PO BID  Continue Lisinopril 2.5mg PO daily      AAA (abdominal aortic aneurysm) without rupture  Plan as above.     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       Aneurysm of infrarenal abdominal aorta  CT Abdomen:  "There is a 5 x 4.8 cm infrarenal abdominal aortic aneurysm. There is extensive plaque and thrombus throughout the abdominal aorta which extends into the iliac arteries. This aneurysm extends over a craniocaudad length of 7.6 cm and terminates at the aortic bifurcation.  There is apparent high-grade narrowing at the origin of the left common. There is fusiform dilatation of both the right and left common iliac arteries measuring 2.2 and 1.7 cm in diameter respectively."  Vascular " Surgery consulted - No acute intervention.  F/u in 3 months in clinic with aortic ultrasound    Constipation  Senna and miralax       CAD (coronary artery disease)  Plan as above.       COPD (chronic obstructive pulmonary disease)  Plan as above.     BPH (benign prostatic hyperplasia)  Endorses urinary straining  Continue arreola  Continue Flomax 0.4mg PO daily            Thrombocytopenia  Chronic and stable  Monitor for bleeding    Acute on chronic respiratory failure with hypoxia and hypercapnia  Chronic from tobacco abuse  Satting 97% on 2L NC - goal sats 88-92%  Started on Spiriva          CKD (chronic kidney disease) stage 3, GFR 30-59 ml/min  Lab Results   Component Value Date    CREATININE 1.5 (H) 04/07/2019     Sr Cr stable  Cont to monitor with daily labs   Avoid nephrotoxins.   Renally dose all medications   Monitor events that may lead to decreased renal perfusion (hypovolemia, hypotension, sepsis).   Monitor urine output  obstruction precipitates worsening in GFR.      Tobacco abuse  Encouraged cessation  Denies Nicotine patch            Peripheral vascular disease  Chronic and stable  Continue Aspirin 81mg PO daily  Continue Lipitor 80mg PO daily  Continue Plavix 75mg PO daily      History of MI (myocardial infarction)  Plan as above.       Mixed hyperlipidemia  Chronic and stable  Continue Lipitor 80mg PO daily      Hx of CABG  Per CAD       Chronic ischemic heart disease, unspecified  CAD s/p CABG  History of MI  Chronic and stable but endorses some chest pain - likely 2/2 pleural effusion  Trend troponin x 3  Continue Aspirin 81mg PO daily  Continue Coreg 3.125mg PO BID  Continue Lisinopril 2.5mg PO daily  Continue Lipitor 80mg PO daily  Continue Plavix 75mg PO daily            VTE Risk Mitigation (From admission, onward)        Ordered     IP VTE LOW RISK PATIENT  Once      04/05/19 3489              Tim Ly MD  Department of Hospital Medicine   [facility]

## 2019-04-08 NOTE — ASSESSMENT & PLAN NOTE
Endorses urinary straining  Continue arreola --> voiding trial today   Continue Flomax 0.4mg PO daily

## 2019-04-08 NOTE — PROGRESS NOTES
Removed arreola catheter per MD order. Pulled back 8ml of clear fluid. Patient tolerated well. Patient voided once arreola was removed. Will continue to monitor. Patient report seeing red once he urinated.

## 2019-04-08 NOTE — ASSESSMENT & PLAN NOTE
CHF Pathway initiated  Last 2D echo March 2019 with EF 45% and diastolic dysfunction  BNP pending and CXR with pulmonary edema  IV diuresis with Lasix 40mg IV BID with good response. Diuresed 6L since admit. Now becoming volume contracted and symptomatically improved. Will transition back to home dose of 40 mg daily on 4/9. DC IV lasix.  Strict I&Os with daily weights.   Continue Aspirin 81mg PO daily  Continue Coreg 3.125mg PO BID  Continue Lisinopril 2.5mg PO daily

## 2019-04-08 NOTE — ASSESSMENT & PLAN NOTE
Lab Results   Component Value Date    CREATININE 1.6 (H) 04/08/2019     Sr Cr stable at baseline  Cont to monitor with daily labs   Avoid nephrotoxins.   Renally dose all medications   Monitor events that may lead to decreased renal perfusion (hypovolemia, hypotension, sepsis).   Monitor urine output  obstruction precipitates worsening in GFR.

## 2019-04-08 NOTE — PROGRESS NOTES
"[facility] Hospital Medicine  Progress Note    Patient Name: Yogesh Lima  MRN: [id number]  Patient Class: IP- Inpatient   Admission Date: 4/5/2019  Length of Stay: 3 days  Attending Physician: Tim Ly MD  Primary Care Provider: [facility] Medicine Team: [facility] HOSP MED G Tim Ly MD    Subjective:     Principal Problem:Acute on chronic combined systolic and diastolic congestive heart failure    HPI:  Mr. Yogesh Lima is a 63 y.o. male with CAD s/p CABG, chronic combined systolic and diastolic heart failure, PVD, and chronic respiratory failure 2/2 COPD who presents to [facility] as a transfer from [facility] for Vascular Surgery evaluation for an AAA.  He originally presented to the ED for evaluation of shortness of breathing.  He endorses acute on SOB that started the day prior to admission when he was lying down to go to sleep, as well as SIM.   He is unable to walk more then about 2 steps before becoming winded.  He denies any LE swelling, but does endorse some left sided chest pain that he describes as a punching sensation.  He claims to be compliant with all of his medications.  He endorses constipation, urinary retention and abdominal pain.  He reports that his stools are pencil thin.  He currently smokes about 10 cigarettes a day.     While at [place], he was found to be satting 93% on room air.  He was placed on oxygen with improvement in his saturations.  CT abdomen was performed which showed: "There is a 5 x 4.8 cm infrarenal abdominal aortic aneurysm. There is extensive plaque and thrombus throughout the abdominal aorta which extends into the iliac arteries. This aneurysm extends over a craniocaudad length of 7.6 cm and terminates at the aortic bifurcation.  There is apparent high-grade narrowing at the origin of the left common. There is fusiform dilatation of both the right and left common iliac arteries measuring 2.2 and 1.7 cm " "in diameter respectively." He was transferred to [facility] for Vascular Surgery evaluation.     Upon arrival to the ED, he was evaluated by Vascular Surgery who said no acute intervention and to follow up in clinic with a repeat ultrasound in 3 months.  He was admitted to Hospital Medicine for further management of his CHF exacerbation.    Hospital Course:  No notes on file    Interval History: No acute events overnight. Diuresing well; 1L over the past 24 hrs. Total thus far about 6L. Ambulated with therapy this am and feels better.     Review of Systems   Constitutional: Negative for chills, fatigue and fever.   HENT: Negative for congestion.    Respiratory: Positive for shortness of breath (improved ). Negative for cough and wheezing.    Cardiovascular: Negative for chest pain and palpitations.   Gastrointestinal: Negative for abdominal distention and abdominal pain.   Musculoskeletal: Negative for arthralgias and back pain.   Neurological: Negative for dizziness and headaches.   Psychiatric/Behavioral: Negative.      Objective:     Vital Signs (Most Recent):  Temp: 97.7 °F (36.5 °C) (04/08/19 0401)  Pulse: 103 (04/08/19 1045)  Resp: 19 (04/08/19 1045)  BP: 94/66 (04/08/19 1045)  SpO2: 98 % (04/08/19 1045) Vital Signs (24h Range):  Temp:  [97.7 °F (36.5 °C)-97.8 °F (36.6 °C)] 97.7 °F (36.5 °C)  Pulse:  [] 103  Resp:  [16-20] 19  SpO2:  [96 %-100 %] 98 %  BP: ()/(61-89) 94/66     Weight: 61.6 kg (135 lb 12.9 oz)  Body mass index is 20.65 kg/m².    Intake/Output Summary (Last 24 hours) at 4/8/2019 1216  Last data filed at 4/8/2019 0500  Gross per 24 hour   Intake 1180 ml   Output 1775 ml   Net -595 ml      Physical Exam   Constitutional: He is oriented to person, place, and time. No distress. Nasal cannula in place.   Ambulating in the andrew with therapy   HENT:   Head: Normocephalic and atraumatic.   Mouth/Throat: Abnormal dentition.   Eyes: Pupils are equal, round, and reactive to light. No scleral icterus. " "  Neck: Normal range of motion.   Cardiovascular: Normal rate, regular rhythm and normal heart sounds.   No murmur heard.  Pulmonary/Chest: Effort normal and breath sounds normal. No respiratory distress. He has no wheezes.   Diminished basilar breath sounds. No significant crackles.    Abdominal: Soft. Bowel sounds are normal. He exhibits no distension. There is no tenderness.   Musculoskeletal: He exhibits no edema or deformity.   Neurological: He is alert and oriented to person, place, and time.   Skin: He is not diaphoretic. No erythema.   Psychiatric: He has a normal mood and affect. His behavior is normal. Judgment and thought content normal.       Significant Labs: All pertinent labs within the past 24 hours have been reviewed.    Significant Imaging: I have reviewed all pertinent imaging results/findings within the past 24 hours.    Assessment/Plan:      * Acute on chronic combined systolic and diastolic congestive heart failure  CHF Pathway initiated  Last 2D echo March 2019 with EF 45% and diastolic dysfunction  BNP pending and CXR with pulmonary edema  IV diuresis with Lasix 40mg IV BID with good response. Diuresed 6L since admit. Now becoming volume contracted and symptomatically improved. Will transition back to home dose of 40 mg daily on 4/9. DC IV lasix.  Strict I&Os with daily weights.   Continue Aspirin 81mg PO daily  Continue Coreg 3.125mg PO BID  Continue Lisinopril 2.5mg PO daily      AAA (abdominal aortic aneurysm) without rupture  Plan as above.     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       Aneurysm of infrarenal abdominal aorta  CT Abdomen:  "There is a 5 x 4.8 cm infrarenal abdominal aortic aneurysm. There is extensive plaque and thrombus throughout the abdominal aorta which extends into the iliac arteries. This aneurysm extends over a craniocaudad length of 7.6 cm and terminates at the aortic bifurcation.  There is apparent high-grade narrowing at " "the origin of the left common. There is fusiform dilatation of both the right and left common iliac arteries measuring 2.2 and 1.7 cm in diameter respectively."  Vascular Surgery consulted - No acute intervention.  F/u in 3 months in clinic with aortic ultrasound    Constipation  Senna and miralax       CAD (coronary artery disease)  Plan as above.       COPD (chronic obstructive pulmonary disease)  Plan as above.     BPH (benign prostatic hyperplasia)  Endorses urinary straining  Continue arreola --> voiding trial today   Continue Flomax 0.4mg PO daily            Thrombocytopenia  Chronic and stable  Monitor for bleeding    Acute on chronic respiratory failure with hypoxia and hypercapnia  Chronic from tobacco abuse  Satting 97% on 2L NC - goal sats 88-92%  Started on Spiriva          CKD (chronic kidney disease) stage 3, GFR 30-59 ml/min  Lab Results   Component Value Date    CREATININE 1.6 (H) 04/08/2019     Sr Cr stable at baseline  Cont to monitor with daily labs   Avoid nephrotoxins.   Renally dose all medications   Monitor events that may lead to decreased renal perfusion (hypovolemia, hypotension, sepsis).   Monitor urine output  obstruction precipitates worsening in GFR.      Tobacco abuse  Encouraged cessation  Denies Nicotine patch            Peripheral vascular disease  Chronic and stable  Continue Aspirin 81mg PO daily  Continue Lipitor 80mg PO daily  Continue Plavix 75mg PO daily      History of MI (myocardial infarction)  Plan as above.       Mixed hyperlipidemia  Chronic and stable  Continue Lipitor 80mg PO daily      Hx of CABG  Per CAD       Chronic ischemic heart disease, unspecified  CAD s/p CABG  History of MI  Chronic and stable but endorses some chest pain - likely 2/2 pleural effusion  Trend troponin x 3  Continue Aspirin 81mg PO daily  Continue Coreg 3.125mg PO BID  Continue Lisinopril 2.5mg PO daily  Continue Lipitor 80mg PO daily  Continue Plavix 75mg PO daily            VTE Risk Mitigation " (From admission, onward)        Ordered     IP VTE LOW RISK PATIENT  Once      04/05/19 6370              Tim Ly MD  Department of Hospital Medicine   [facility]

## 2019-04-08 NOTE — SUBJECTIVE & OBJECTIVE
Interval History: No acute events overnight. Diuresing well; 1L over the past 24 hrs. Total thus far about 6L. Ambulated with therapy this am and feels better.     Review of Systems   Constitutional: Negative for chills, fatigue and fever.   HENT: Negative for congestion.    Respiratory: Positive for shortness of breath (improved ). Negative for cough and wheezing.    Cardiovascular: Negative for chest pain and palpitations.   Gastrointestinal: Negative for abdominal distention and abdominal pain.   Musculoskeletal: Negative for arthralgias and back pain.   Neurological: Negative for dizziness and headaches.   Psychiatric/Behavioral: Negative.      Objective:     Vital Signs (Most Recent):  Temp: 97.7 °F (36.5 °C) (04/08/19 0401)  Pulse: 103 (04/08/19 1045)  Resp: 19 (04/08/19 1045)  BP: 94/66 (04/08/19 1045)  SpO2: 98 % (04/08/19 1045) Vital Signs (24h Range):  Temp:  [97.7 °F (36.5 °C)-97.8 °F (36.6 °C)] 97.7 °F (36.5 °C)  Pulse:  [] 103  Resp:  [16-20] 19  SpO2:  [96 %-100 %] 98 %  BP: ()/(61-89) 94/66     Weight: 61.6 kg (135 lb 12.9 oz)  Body mass index is 20.65 kg/m².    Intake/Output Summary (Last 24 hours) at 4/8/2019 1216  Last data filed at 4/8/2019 0500  Gross per 24 hour   Intake 1180 ml   Output 1775 ml   Net -595 ml      Physical Exam   Constitutional: He is oriented to person, place, and time. No distress. Nasal cannula in place.   Ambulating in the [place]rew with therapy   HENT:   Head: Normocephalic and atraumatic.   Mouth/Throat: Abnormal dentition.   Eyes: Pupils are equal, round, and reactive to light. No scleral icterus.   Neck: Normal range of motion.   Cardiovascular: Normal rate, regular rhythm and normal heart sounds.   No murmur heard.  Pulmonary/Chest: Effort normal and breath sounds normal. No respiratory distress. He has no wheezes.   Diminished basilar breath sounds. No significant crackles.    Abdominal: Soft. Bowel sounds are normal. He exhibits no distension. There is no  tenderness.   Musculoskeletal: He exhibits no edema or deformity.   Neurological: He is alert and oriented to person, place, and time.   Skin: He is not diaphoretic. No erythema.   Psychiatric: He has a normal mood and affect. His behavior is normal. Judgment and thought content normal.       Significant Labs: All pertinent labs within the past 24 hours have been reviewed.    Significant Imaging: I have reviewed all pertinent imaging results/findings within the past 24 hours.

## 2019-04-08 NOTE — PLAN OF CARE
Problem: Adult Inpatient Plan of Care  Goal: Plan of Care Review  Outcome: Ongoing (interventions implemented as appropriate)     04/08/19 0560   Plan of Care Review   Plan of Care Reviewed With patient   Pt remains free from falls and injuries during shift. Oriented x 4. Vital signs stable. Oxygen wnl on room air. Able to voice needs, none at this time. Denied pain. No signs of acute distress noted at this time. Bed in lowest position, wheels locked, and call light in reach. Will continue to monitor. Safety maintained.

## 2019-04-09 NOTE — PLAN OF CARE
Problem: Adult Inpatient Plan of Care  Goal: Plan of Care Review  Outcome: Ongoing (interventions implemented as appropriate)     04/09/19 0510   Plan of Care Review   Plan of Care Reviewed With patient   Pt remains free from falls and injuries during shift. Oriented x 4. Vital signs stable. Oxygen wnl on room air. Able to voice needs, none at this time. Denied pain. Constantly takes telemetry box off, refused to let this nurse apply telemetry on him. Sandra wesley notified of pt refusing tele. No signs of acute distress noted at this time. Bed in lowest position, wheels locked, and call light in reach. Will continue to monitor. Safety maintained.

## 2019-04-09 NOTE — PLAN OF CARE
Problem: Physical Therapy Goal  Goal: Physical Therapy Goal  Pt does not require additional acute PT services at this time d/t baseline c functional mobility. Pt amb 100 ft c no AD (I).    Pt educated on asking medical staff for PT consult if changes in functional status occurs. - v/u        Outcome: Outcome(s) achieved Date Met: 04/09/19  Juan Jose and D/C from acute PT services    Jaleel Roman, PT,DPT  4/9/2019

## 2019-04-09 NOTE — PT/OT/SLP EVAL
"Physical Therapy Evaluation and Discharge Note    Patient Name:  Yogesh Lima   MRN:  [id number]    Recommendations:     Discharge Recommendations:  home   Discharge Equipment Recommendations: none   Barriers to discharge: None    Assessment:     Yogesh Lima is a 63 y.o. male admitted with a medical diagnosis of Acute on chronic combined systolic and diastolic congestive heart failure. .  At this time, patient is functioning at their prior level of function and does not require further acute PT services.     Recent Surgery: * No surgery found *      Plan:     During this hospitalization, patient does not require further acute PT services.  Please re-consult if situation changes.      Subjective     Chief Complaint: constipation and difficulty urinating  Patient/Family Comments/goals: "I'm all backed up."  Pain/Comfort:  · Pain Rating 1: 0/10    Patients cultural, spiritual, Jainism conflicts given the current situation: no    Living Environment:  Pt lives alone in [address].  PTA driving, performing community services, no falls.  Prior to admission, patients level of function was independent.  Equipment used at home: none.  DME owned (not currently used): none.  Upon discharge, patient will have assistance from self.    Objective:     Communicated with RN and OT prior to session.  Patient found HOB elevated with peripheral IV, telemetry upon PT entry to room.    General Precautions: Standard,     Orthopedic Precautions:N/A   Braces: N/A     Exams:  · Cognitive Exam:  Patient is oriented to Person, Place, Time and Situation  · RLE ROM: WFL  · RLE Strength: WFL  · LLE ROM: WFL  · LLE Strength: WFL    Functional Mobility:  · Bed Mobility:     · Rolling Right: independence  · Scooting: independence  · Supine to Sit: independence  · Transfers:  Sit to Stand:  independence with no AD  · Gait: 100ft c no AD (I)  · Balance: standing (I)    AM-PAC 6 CLICK MOBILITY  Total Score:23       Therapeutic Activities and " Exercises:   Pt educated on: PT role/POC; safety c mobility; benefits of OOB activities; performing therex; d/c recs - v/u      AM-PAC 6 CLICK MOBILITY  Total Score:23     Patient left sitting EOB with all lines intact, call button in reach and RN notified.    GOALS:   Multidisciplinary Problems     Physical Therapy Goals     Not on file          Multidisciplinary Problems (Resolved)        Problem: Physical Therapy Goal    Goal Priority Disciplines Outcome Goal Variances Interventions   Physical Therapy Goal   (Resolved)     PT, PT/OT Outcome(s) achieved     Description:  Pt does not require additional acute PT services at this time d/t baseline c functional mobility. Pt amb 100 ft c no AD (I).    Pt educated on asking medical staff for PT consult if changes in functional status occurs. - v/u                          History:     Past Medical History:   Diagnosis Date    Coronary artery disease     Hyperlipidemia     Hypertension        History reviewed. No pertinent surgical history.    Time Tracking:     PT Received On: 04/09/19  PT Start Time: 1020     PT Stop Time: 1030  PT Total Time (min): 10 min     Billable Minutes: Evaluation 10 min      Jaleel Roman PT  04/09/2019

## 2019-04-09 NOTE — PLAN OF CARE
Problem: Occupational Therapy Goal  Goal: Occupational Therapy Goal  Outcome: Outcome(s) achieved Date Met: 04/09/19  Brittanieal complete. Pt tolerated session well. D/C from OT.

## 2019-04-09 NOTE — PT/OT/SLP PROGRESS
Occupational Therapy      Patient Name:  Yogesh Lima   MRN:  [id number]    Patient not seen today secondary to Unavailable (Comment). Upon attempts x 2, patient with MD's for bedside procedure.    Will follow-up per schedule as able.    Cristy Villela, OT  4/8/2019

## 2019-04-10 NOTE — PROGRESS NOTES
"[facility] Hospital Medicine  Progress Note    Patient Name: Yogesh Lima  MRN: [id number]  Patient Class: IP- Inpatient   Admission Date: 4/5/2019  Length of Stay: 4 days  Attending Physician: Tim Ly MD  Primary Care Provider: [facility] Medicine Team: [facility] HOSP MED G Tim Ly MD    Subjective:     Principal Problem:Acute on chronic combined systolic and diastolic congestive heart failure    HPI:  Mr. Yogesh Lima is a 63 y.o. male with CAD s/p CABG, chronic combined systolic and diastolic heart failure, PVD, and chronic respiratory failure 2/2 COPD who presents to [facility] as a transfer from [facility] for Vascular Surgery evaluation for an AAA.  He originally presented to the ED for evaluation of shortness of breathing.  He endorses acute on SOB that started the day prior to admission when he was lying down to go to sleep, as well as SIM.   He is unable to walk more then about 2 steps before becoming winded.  He denies any LE swelling, but does endorse some left sided chest pain that he describes as a punching sensation.  He claims to be compliant with all of his medications.  He endorses constipation, urinary retention and abdominal pain.  He reports that his stools are pencil thin.  He currently smokes about 10 cigarettes a day.     While at [place], he was found to be satting 93% on room air.  He was placed on oxygen with improvement in his saturations.  CT abdomen was performed which showed: "There is a 5 x 4.8 cm infrarenal abdominal aortic aneurysm. There is extensive plaque and thrombus throughout the abdominal aorta which extends into the iliac arteries. This aneurysm extends over a craniocaudad length of 7.6 cm and terminates at the aortic bifurcation.  There is apparent high-grade narrowing at the origin of the left common. There is fusiform dilatation of both the right and left common iliac arteries measuring 2.2 and 1.7 cm " "in diameter respectively." He was transferred to [facility] for Vascular Surgery evaluation.     Upon arrival to the ED, he was evaluated by Vascular Surgery who said no acute intervention and to follow up in clinic with a repeat ultrasound in 3 months.  He was admitted to Hospital Medicine for further management of his CHF exacerbation.    Hospital Course:  No notes on file    Interval History: Mari discontinued for voiding trial but patient reports inability to fully empty bladder. Still constipated. Otherwise SOB has improved.     Review of Systems   Constitutional: Negative for chills, fatigue and fever.   HENT: Negative for congestion.    Respiratory: Negative for cough, shortness of breath and wheezing.    Cardiovascular: Negative for chest pain and palpitations.   Gastrointestinal: Positive for constipation. Negative for abdominal distention and abdominal pain.   Genitourinary: Positive for difficulty urinating.   Musculoskeletal: Negative for arthralgias and back pain.   Neurological: Negative for dizziness and headaches.   Psychiatric/Behavioral: Negative.      Objective:     Vital Signs (Most Recent):  Temp: 97.9 °F (36.6 °C) (04/09/19 1600)  Pulse: 89 (04/09/19 1600)  Resp: 16 (04/09/19 1600)  BP: 108/63 (04/09/19 1600)  SpO2: 98 % (04/09/19 1600) Vital Signs (24h Range):  Temp:  [97.6 °F (36.4 °C)-98.1 °F (36.7 °C)] 97.9 °F (36.6 °C)  Pulse:  [] 89  Resp:  [16-20] 16  SpO2:  [97 %-99 %] 98 %  BP: (108-114)/(57-73) 108/63     Weight: 61.4 kg (135 lb 5.8 oz)  Body mass index is 20.58 kg/m².    Intake/Output Summary (Last 24 hours) at 4/9/2019 2123  Last data filed at 4/9/2019 1800  Gross per 24 hour   Intake 1270 ml   Output --   Net 1270 ml      Physical Exam   Constitutional: He is oriented to person, place, and time. No distress. Nasal cannula in place.   HENT:   Head: Normocephalic and atraumatic.   Mouth/Throat: Abnormal dentition.   Eyes: Pupils are equal, round, and reactive to light. No scleral " "icterus.   Neck: Normal range of motion.   Cardiovascular: Normal rate, regular rhythm and normal heart sounds.   No murmur heard.  Pulmonary/Chest: Effort normal and breath sounds normal. No respiratory distress. He has no wheezes.   Diminished basilar breath sounds. No significant crackles.    Abdominal: Soft. Bowel sounds are normal. He exhibits no distension. There is no tenderness.   Musculoskeletal: He exhibits no edema or deformity.   Neurological: He is alert and oriented to person, place, and time.   Skin: He is not diaphoretic. No erythema.   Psychiatric: He has a normal mood and affect. His behavior is normal. Judgment and thought content normal.       Significant Labs: All pertinent labs within the past 24 hours have been reviewed.    Significant Imaging: I have reviewed all pertinent imaging results/findings within the past 24 hours.    Assessment/Plan:      * Acute on chronic combined systolic and diastolic congestive heart failure  CHF Pathway initiated  Last 2D echo March 2019 with EF 45% and diastolic dysfunction  BNP pending and CXR with pulmonary edema  IV diuresis with Lasix 40mg IV BID with good response. Diuresed 6L since admit. Now becoming volume contracted and symptomatically improved. Switched to home dose lasix 0f 40 mg daily and received dose today but will hold as creatinine is at the upper limit of normal range for him.   Strict I&Os with daily weights.   Continue Aspirin 81mg PO daily  Continue Coreg 3.125mg PO BID  Continue Lisinopril 2.5mg PO daily --> hold due to elevated creatinine       AAA (abdominal aortic aneurysm) without rupture  Plan as above.     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       Aneurysm of infrarenal abdominal aorta  CT Abdomen:  "There is a 5 x 4.8 cm infrarenal abdominal aortic aneurysm. There is extensive plaque and thrombus throughout the abdominal aorta which extends into the iliac arteries. This aneurysm extends over " "a craniocaudad length of 7.6 cm and terminates at the aortic bifurcation.  There is apparent high-grade narrowing at the origin of the left common. There is fusiform dilatation of both the right and left common iliac arteries measuring 2.2 and 1.7 cm in diameter respectively."  Vascular Surgery consulted - No acute intervention.  F/u in 3 months in clinic with aortic ultrasound    Constipation  Senna and miralax prescribed but has not had a bm yet  Trial of scheduled lactulose       CAD (coronary artery disease)  Plan as above.       COPD (chronic obstructive pulmonary disease)  Plan as above.     BPH (benign prostatic hyperplasia)  Endorses urinary straining  Voiding trial; arreola dc'd; nursing to do post void residuals as patient still reporting difficulty emptying bladder   Continue Flomax 0.4mg PO daily            Thrombocytopenia  Chronic and stable  Monitor for bleeding    Acute on chronic respiratory failure with hypoxia and hypercapnia  Chronic from tobacco abuse  Satting 97% on 2L NC - goal sats 88-92%  Started on Spiriva          CKD (chronic kidney disease) stage 3, GFR 30-59 ml/min  Lab Results   Component Value Date    CREATININE 1.7 (H) 04/09/2019     Sr Cr at upper limit of normal  Patient seems volume contracted on labs  Hold lasix and low dose lisinopril for now   Cont to monitor with daily labs   Avoid nephrotoxins.   Renally dose all medications   Monitor events that may lead to decreased renal perfusion (hypovolemia, hypotension, sepsis).   Monitor urine output  obstruction precipitates worsening in GFR.      Tobacco abuse  Encouraged cessation  Denies Nicotine patch            Peripheral vascular disease  Chronic and stable  Continue Aspirin 81mg PO daily  Continue Lipitor 80mg PO daily  Continue Plavix 75mg PO daily      History of MI (myocardial infarction)  Plan as above.       Mixed hyperlipidemia  Chronic and stable  Continue Lipitor 80mg PO daily      Hx of CABG  Per CAD       Chronic " ischemic heart disease, unspecified  CAD s/p CABG  History of MI  Chronic and stable but endorses some chest pain - likely 2/2 pleural effusion  Trend troponin x 3  Continue Aspirin 81mg PO daily  Continue Coreg 3.125mg PO BID  Continue Lisinopril 2.5mg PO daily  Continue Lipitor 80mg PO daily  Continue Plavix 75mg PO daily            VTE Risk Mitigation (From admission, onward)        Ordered     heparin (porcine) injection 5,000 Units  Every 8 hours      04/08/19 1220     IP VTE LOW RISK PATIENT  Once      04/05/19 1850              Tim Ly MD  Department of Hospital Medicine   [facility]

## 2019-04-10 NOTE — ASSESSMENT & PLAN NOTE
CHF Pathway initiated  Last 2D echo March 2019 with EF 45% and diastolic dysfunction  BNP pending and CXR with pulmonary edema  IV diuresis with Lasix 40mg IV BID with good response. Diuresed 6L since admit. Now becoming volume contracted and symptomatically improved. Switched to home dose lasix 0f 40 mg daily and received dose today but will hold as creatinine is at the upper limit of normal range for him.   Strict I&Os with daily weights.   Continue Aspirin 81mg PO daily  Continue Coreg 3.125mg PO BID  Continue Lisinopril 2.5mg PO daily --> hold due to elevated creatinine

## 2019-04-10 NOTE — SUBJECTIVE & OBJECTIVE
Interval History: Mari discontinued for voiding trial but patient reports inability to fully empty bladder. Still constipated. Otherwise SOB has improved.     Review of Systems   Constitutional: Negative for chills, fatigue and fever.   HENT: Negative for congestion.    Respiratory: Negative for cough, shortness of breath and wheezing.    Cardiovascular: Negative for chest pain and palpitations.   Gastrointestinal: Positive for constipation. Negative for abdominal distention and abdominal pain.   Genitourinary: Positive for difficulty urinating.   Musculoskeletal: Negative for arthralgias and back pain.   Neurological: Negative for dizziness and headaches.   Psychiatric/Behavioral: Negative.      Objective:     Vital Signs (Most Recent):  Temp: 97.9 °F (36.6 °C) (04/09/19 1600)  Pulse: 89 (04/09/19 1600)  Resp: 16 (04/09/19 1600)  BP: 108/63 (04/09/19 1600)  SpO2: 98 % (04/09/19 1600) Vital Signs (24h Range):  Temp:  [97.6 °F (36.4 °C)-98.1 °F (36.7 °C)] 97.9 °F (36.6 °C)  Pulse:  [] 89  Resp:  [16-20] 16  SpO2:  [97 %-99 %] 98 %  BP: (108-114)/(57-73) 108/63     Weight: 61.4 kg (135 lb 5.8 oz)  Body mass index is 20.58 kg/m².    Intake/Output Summary (Last 24 hours) at 4/9/2019 2123  Last data filed at 4/9/2019 1800  Gross per 24 hour   Intake 1270 ml   Output --   Net 1270 ml      Physical Exam   Constitutional: He is oriented to person, place, and time. No distress. Nasal cannula in place.   HENT:   Head: Normocephalic and atraumatic.   Mouth/Throat: Abnormal dentition.   Eyes: Pupils are equal, round, and reactive to light. No scleral icterus.   Neck: Normal range of motion.   Cardiovascular: Normal rate, regular rhythm and normal heart sounds.   No murmur heard.  Pulmonary/Chest: Effort normal and breath sounds normal. No respiratory distress. He has no wheezes.   Diminished basilar breath sounds. No significant crackles.    Abdominal: Soft. Bowel sounds are normal. He exhibits no distension. There is no  tenderness.   Musculoskeletal: He exhibits no edema or deformity.   Neurological: He is alert and oriented to person, place, and time.   Skin: He is not diaphoretic. No erythema.   Psychiatric: He has a normal mood and affect. His behavior is normal. Judgment and thought content normal.       Significant Labs: All pertinent labs within the past 24 hours have been reviewed.    Significant Imaging: I have reviewed all pertinent imaging results/findings within the past 24 hours.

## 2019-04-10 NOTE — ASSESSMENT & PLAN NOTE
Senna and miralax prescribed but has not had a bm yet  Trial of scheduled lactulose   Last BM yesterday  Resolved.

## 2019-04-10 NOTE — PLAN OF CARE
Problem: Adult Inpatient Plan of Care  Goal: Plan of Care Review  Outcome: Ongoing (interventions implemented as appropriate)  No acute events throughout night. Pt. able to express needs. . Safety maintained.   Bed in low position, call  light in reach.    Will continue to monitor.

## 2019-04-10 NOTE — PROGRESS NOTES
"[facility] Hospital Medicine  Progress Note    Patient Name: Yogesh Lima  MRN: [id number]  Patient Class: IP- Inpatient   Admission Date: 4/5/2019  Length of Stay: 5 days  Attending Physician: Juwan Ly MD  Primary Care Provider: [facility] Medicine Team: [facility] HOSP MED  Juwan Shetty MD    Subjective:     Principal Problem:Acute on chronic combined systolic and diastolic congestive heart failure    HPI:  Mr. Yogesh Lima is a 63 y.o. male with CAD s/p CABG, chronic combined systolic and diastolic heart failure, PVD, and chronic respiratory failure 2/2 COPD who presents to [facility] as a transfer from [facility] for Vascular Surgery evaluation for an AAA.  He originally presented to the ED for evaluation of shortness of breathing.  He endorses acute on SOB that started the day prior to admission when he was lying down to go to sleep, as well as SIM.   He is unable to walk more then about 2 steps before becoming winded.  He denies any LE swelling, but does endorse some left sided chest pain that he describes as a punching sensation.  He claims to be compliant with all of his medications.  He endorses constipation, urinary retention and abdominal pain.  He reports that his stools are pencil thin.  He currently smokes about 10 cigarettes a day.     While at [facility], he was found to be satting 93% on room air.  He was placed on oxygen with improvement in his saturations.  CT abdomen was performed which showed: "There is a 5 x 4.8 cm infrarenal abdominal aortic aneurysm. There is extensive plaque and thrombus throughout the abdominal aorta which extends into the iliac arteries. This aneurysm extends over a craniocaudad length of 7.6 cm and terminates at the aortic bifurcation.  There is apparent high-grade narrowing at the origin of the left common. There is fusiform dilatation of both the right and left common iliac arteries measuring 2.2 and 1.7 cm in " "diameter respectively." He was transferred to [facility] for Vascular Surgery evaluation.     Upon arrival to the ED, he was evaluated by Vascular Surgery who said no acute intervention and to follow up in clinic with a repeat ultrasound in 3 months.  He was admitted to Hospital Medicine for further management of his CHF exacerbation.    Hospital Course:  No notes on file    Interval History: No acute events overnight. Sr creatine slightly elevated. Lasix on hold. Mari discontinued for voiding trial but patient reports inability to fully empty bladder. Otherwise SOB has improved. Constipation resolved.     Review of Systems   Constitutional: Negative for chills, fatigue and fever.   HENT: Negative for congestion.    Respiratory: Negative for cough, shortness of breath and wheezing.    Cardiovascular: Negative for chest pain and palpitations.   Gastrointestinal: Positive for constipation. Negative for abdominal distention and abdominal pain.   Genitourinary: Positive for difficulty urinating.   Musculoskeletal: Negative for arthralgias and back pain.   Neurological: Negative for dizziness and headaches.   Psychiatric/Behavioral: Negative.      Objective:     Vital Signs (Most Recent):  Temp: 97.7 °F (36.5 °C) (04/10/19 0800)  Pulse: 90 (04/10/19 1000)  Resp: 16 (04/10/19 1000)  BP: 107/60 (04/10/19 0800)  SpO2: 95 % (04/10/19 0800) Vital Signs (24h Range):  Temp:  [97.7 °F (36.5 °C)-98.6 °F (37 °C)] 97.7 °F (36.5 °C)  Pulse:  [] 90  Resp:  [16] 16  SpO2:  [95 %-99 %] 95 %  BP: (102-108)/(55-72) 107/60     Weight: 61 kg (134 lb 7.7 oz)  Body mass index is 20.45 kg/m².    Intake/Output Summary (Last 24 hours) at 4/10/2019 1119  Last data filed at 4/10/2019 1000  Gross per 24 hour   Intake 990 ml   Output 750 ml   Net 240 ml      Physical Exam   Constitutional: He is oriented to person, place, and time. No distress. Nasal cannula in place.   HENT:   Head: Normocephalic and atraumatic.   Mouth/Throat: Abnormal dentition. " "  Eyes: Pupils are equal, round, and reactive to light. No scleral icterus.   Neck: Normal range of motion.   Cardiovascular: Normal rate, regular rhythm and normal heart sounds.   No murmur heard.  Pulmonary/Chest: Effort normal and breath sounds normal. No respiratory distress. He has no wheezes.   Diminished basilar breath sounds. No significant crackles.    Abdominal: Soft. Bowel sounds are normal. He exhibits no distension. There is no tenderness.   Musculoskeletal: He exhibits no edema or deformity.   Neurological: He is alert and oriented to person, place, and time.   Skin: He is not diaphoretic. No erythema.   Psychiatric: He has a normal mood and affect. His behavior is normal. Judgment and thought content normal.       Significant Labs: All pertinent labs within the past 24 hours have been reviewed.    Significant Imaging: I have reviewed all pertinent imaging results/findings within the past 24 hours.    Assessment/Plan:      * Acute on chronic combined systolic and diastolic congestive heart failure  CHF Pathway initiated  Last 2D echo March 2019 with EF 45% and diastolic dysfunction  BNP pending and CXR with pulmonary edema  IV diuresis with Lasix 40mg IV BID with good response. Diuresed 6L since admit. Now becoming volume contracted and symptomatically improved. Switched to home dose lasix of 40 mg daily but will hold as creatinine is at the upper limit of normal range for him.   Strict I&Os with daily weights.   Continue Aspirin 81mg PO daily  Continue Coreg 3.125mg PO BID  Continue Lisinopril 2.5mg PO daily --> hold due to elevated creatinine     AAA (abdominal aortic aneurysm) without rupture  Plan as above.     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       Aneurysm of infrarenal abdominal aorta  CT Abdomen:  "There is a 5 x 4.8 cm infrarenal abdominal aortic aneurysm. There is extensive plaque and thrombus throughout the abdominal aorta which extends into the " "iliac arteries. This aneurysm extends over a craniocaudad length of 7.6 cm and terminates at the aortic bifurcation.  There is apparent high-grade narrowing at the origin of the left common. There is fusiform dilatation of both the right and left common iliac arteries measuring 2.2 and 1.7 cm in diameter respectively."  Vascular Surgery consulted - No acute intervention.  F/u in 3 months in clinic with aortic ultrasound    Constipation  Senna and miralax prescribed but has not had a bm yet  Trial of scheduled lactulose   Last BM yesterday  Resolved.       CAD (coronary artery disease)  Plan as above.       COPD (chronic obstructive pulmonary disease)  Plan as above.     BPH (benign prostatic hyperplasia)  Endorses urinary straining  Continue Flomax 0.4mg PO daily            Thrombocytopenia  Chronic and stable  Monitor for bleeding    Acute on chronic respiratory failure with hypoxia and hypercapnia  Chronic from tobacco abuse  Satting 97% on 2L NC - goal sats 88-92%  Started on Spiriva          CKD (chronic kidney disease) stage 3, GFR 30-59 ml/min  Lab Results   Component Value Date    CREATININE 1.8 (H) 04/10/2019     Sr Cr at upper limit of normal  Patient seems volume contracted on labs  Hold lasix and low dose lisinopril for now   Cont to monitor with daily labs   Avoid nephrotoxins.   Renally dose all medications   Monitor events that may lead to decreased renal perfusion (hypovolemia, hypotension, sepsis).   Monitor urine output  obstruction precipitates worsening in GFR.    Tobacco abuse  Encouraged cessation  Denies Nicotine patch            Peripheral vascular disease  Chronic and stable  Continue Aspirin 81mg PO daily  Continue Lipitor 80mg PO daily  Continue Plavix 75mg PO daily      History of MI (myocardial infarction)  Plan as above.       Mixed hyperlipidemia  Chronic and stable  Continue Lipitor 80mg PO daily      Hx of CABG  Per CAD       Chronic ischemic heart disease, unspecified  CAD s/p " CABG  History of MI  Chronic and stable but endorses some chest pain - likely 2/2 pleural effusion  Trend troponin x 3  Continue Aspirin 81mg PO daily  Continue Coreg 3.125mg PO BID  Continue Lisinopril 2.5mg PO daily  Continue Lipitor 80mg PO daily  Continue Plavix 75mg PO daily            VTE Risk Mitigation (From admission, onward)        Ordered     heparin (porcine) injection 5,000 Units  Every 8 hours      04/08/19 1220     IP VTE LOW RISK PATIENT  Once      04/05/19 1850              Juwan Shetty MD  Department of Hospital Medicine   [facility]

## 2019-04-10 NOTE — ASSESSMENT & PLAN NOTE
Lab Results   Component Value Date    CREATININE 1.7 (H) 04/09/2019     Sr Cr at upper limit of normal  Patient seems volume contracted on labs  Hold lasix and low dose lisinopril for now   Cont to monitor with daily labs   Avoid nephrotoxins.   Renally dose all medications   Monitor events that may lead to decreased renal perfusion (hypovolemia, hypotension, sepsis).   Monitor urine output  obstruction precipitates worsening in GFR.

## 2019-04-10 NOTE — ASSESSMENT & PLAN NOTE
CHF Pathway initiated  Last 2D echo March 2019 with EF 45% and diastolic dysfunction  BNP pending and CXR with pulmonary edema  IV diuresis with Lasix 40mg IV BID with good response. Diuresed 6L since admit. Now becoming volume contracted and symptomatically improved. Switched to home dose lasix of 40 mg daily but will hold as creatinine is at the upper limit of normal range for him.   Strict I&Os with daily weights.   Continue Aspirin 81mg PO daily  Continue Coreg 3.125mg PO BID  Continue Lisinopril 2.5mg PO daily --> hold due to elevated creatinine

## 2019-04-10 NOTE — SUBJECTIVE & OBJECTIVE
Interval History: No acute events overnight. Sr creatine slightly elevated. Lasix on hold. Mari discontinued for voiding trial but patient reports inability to fully empty bladder. Otherwise SOB has improved. Constipation resolved.     Review of Systems   Constitutional: Negative for chills, fatigue and fever.   HENT: Negative for congestion.    Respiratory: Negative for cough, shortness of breath and wheezing.    Cardiovascular: Negative for chest pain and palpitations.   Gastrointestinal: Positive for constipation. Negative for abdominal distention and abdominal pain.   Genitourinary: Positive for difficulty urinating.   Musculoskeletal: Negative for arthralgias and back pain.   Neurological: Negative for dizziness and headaches.   Psychiatric/Behavioral: Negative.      Objective:     Vital Signs (Most Recent):  Temp: 97.7 °F (36.5 °C) (04/10/19 0800)  Pulse: 90 (04/10/19 1000)  Resp: 16 (04/10/19 1000)  BP: 107/60 (04/10/19 0800)  SpO2: 95 % (04/10/19 0800) Vital Signs (24h Range):  Temp:  [97.7 °F (36.5 °C)-98.6 °F (37 °C)] 97.7 °F (36.5 °C)  Pulse:  [] 90  Resp:  [16] 16  SpO2:  [95 %-99 %] 95 %  BP: (102-108)/(55-72) 107/60     Weight: 61 kg (134 lb 7.7 oz)  Body mass index is 20.45 kg/m².    Intake/Output Summary (Last 24 hours) at 4/10/2019 1119  Last data filed at 4/10/2019 1000  Gross per 24 hour   Intake 990 ml   Output 750 ml   Net 240 ml      Physical Exam   Constitutional: He is oriented to person, place, and time. No distress. Nasal cannula in place.   HENT:   Head: Normocephalic and atraumatic.   Mouth/Throat: Abnormal dentition.   Eyes: Pupils are equal, round, and reactive to light. No scleral icterus.   Neck: Normal range of motion.   Cardiovascular: Normal rate, regular rhythm and normal heart sounds.   No murmur heard.  Pulmonary/Chest: Effort normal and breath sounds normal. No respiratory distress. He has no wheezes.   Diminished basilar breath sounds. No significant crackles.     Abdominal: Soft. Bowel sounds are normal. He exhibits no distension. There is no tenderness.   Musculoskeletal: He exhibits no edema or deformity.   Neurological: He is alert and oriented to person, place, and time.   Skin: He is not diaphoretic. No erythema.   Psychiatric: He has a normal mood and affect. His behavior is normal. Judgment and thought content normal.       Significant Labs: All pertinent labs within the past 24 hours have been reviewed.    Significant Imaging: I have reviewed all pertinent imaging results/findings within the past 24 hours.

## 2019-04-10 NOTE — ASSESSMENT & PLAN NOTE
Endorses urinary straining  Voiding trial; maxwell dc'd; nursing to do post void residuals as patient still reporting difficulty emptying bladder   Continue Flomax 0.4mg PO daily

## 2019-04-10 NOTE — PROGRESS NOTES
Individual Follow-Up Form    4/9/2019    Clinical Status of Patient: Inpatient    Length of Service: 15 minutes        Comments: Spoke with patient today regarding [facility]. Patient is not currently wearing a nicotine patch and denies any urges or cravings at this time. Patient states smoking 10 cigs/day. Patient states no interest in the program and would not like to be enrolled in the Smoking Cessation trust. Discussed the effects of smoking and benefits of quitting with patient, he verbalized understanding. Contact information was provided.       Diagnosis: F17.200

## 2019-04-10 NOTE — ASSESSMENT & PLAN NOTE
Lab Results   Component Value Date    CREATININE 1.8 (H) 04/10/2019     Sr Cr at upper limit of normal  Patient seems volume contracted on labs  Hold lasix and low dose lisinopril for now   Cont to monitor with daily labs   Avoid nephrotoxins.   Renally dose all medications   Monitor events that may lead to decreased renal perfusion (hypovolemia, hypotension, sepsis).   Monitor urine output  obstruction precipitates worsening in GFR.

## 2019-04-11 NOTE — PROGRESS NOTES
Update: JAYLA Barbour, is calling to set up a transportation for pt. Anel, will notify nurse to let nurse know when transport coming to get pt.

## 2019-04-11 NOTE — ASSESSMENT & PLAN NOTE
CHF Pathway initiated  Last 2D echo March 2019 with EF 45% and diastolic dysfunction  BNP pending and CXR with pulmonary edema  IV diuresis with Lasix 40mg IV BID with good response. Diuresed 6L since admit. Now becoming volume contracted and symptomatically improved. Switched to home dose lasix of 40 mg daily .  Strict I&Os with daily weights.   Continue Aspirin 81mg PO daily  Continue Coreg 3.125mg PO BID  Continue Lisinopril 2.5mg PO daily -->

## 2019-04-11 NOTE — ASSESSMENT & PLAN NOTE
Lab Results   Component Value Date    CREATININE 1.4 04/11/2019     Sr Cr at baseline  Patient seems volume contracted on labs  Hold lasix and low dose lisinopril for now   Cont to monitor with daily labs   Avoid nephrotoxins.   Renally dose all medications   Monitor events that may lead to decreased renal perfusion (hypovolemia, hypotension, sepsis).   Monitor urine output  obstruction precipitates worsening in GFR.

## 2019-04-11 NOTE — DISCHARGE SUMMARY
"[facility] Hospital Medicine  Discharge Summary      Patient Name: Yogesh Lima  MRN: [id number]  Admission Date: 4/5/2019  Hospital Length of Stay: 6 days  Discharge Date and Time:  04/11/2019 8:29 AM  Attending Physician: Juwan Ly MD   Discharging Provider: Juwan Shetty MD  Primary Care Provider: [facility] Medicine Team: [facility]OSP MED  Juwan Shetty MD    HPI:   Mr. Yogesh Lima is a 63 y.o. male with CAD s/p CABG, chronic combined systolic and diastolic heart failure, PVD, and chronic respiratory failure 2/2 COPD who presents to [facility] as a transfer from [facility] for Vascular Surgery evaluation for an AAA.  He originally presented to the ED for evaluation of shortness of breathing.  He endorses acute on SOB that started the day prior to admission when he was lying down to go to sleep, as well as SIM.   He is unable to walk more then about 2 steps before becoming winded.  He denies any LE swelling, but does endorse some left sided chest pain that he describes as a punching sensation.  He claims to be compliant with all of his medications.  He endorses constipation, urinary retention and abdominal pain.  He reports that his stools are pencil thin.  He currently smokes about 10 cigarettes a day.     While at [facility], he was found to be satting 93% on room air.  He was placed on oxygen with improvement in his saturations.  CT abdomen was performed which showed: "There is a 5 x 4.8 cm infrarenal abdominal aortic aneurysm. There is extensive plaque and thrombus throughout the abdominal aorta which extends into the iliac arteries. This aneurysm extends over a craniocaudad length of 7.6 cm and terminates at the aortic bifurcation.  There is apparent high-grade narrowing at the origin of the left common. There is fusiform dilatation of both the right and left common iliac arteries measuring 2.2 and 1.7 cm in diameter respectively." He was " "transferred to [facility] for Vascular Surgery evaluation.     Upon arrival to the ED, he was evaluated by Vascular Surgery who said no acute intervention and to follow up in clinic with a repeat ultrasound in 3 months.  He was admitted to Hospital Medicine for further management of his CHF exacerbation.    * No surgery found *      Hospital Course:   No notes on file     Consults:   Consults (From admission, onward)        Status Ordering Provider     Inpatient consult to Registered Dietitian/Nutritionist  Once     Provider:  (Not yet assigned)    Completed MARGARITA MARTÍNEZ     Inpatient consult to Social Work/Case Management  Once     Provider:  (Not yet assigned)    Acknowledged MARGARITA MARTÍNEZ     Inpatient consult to Vascular Surgery  Once     Provider:  (Not yet assigned)    Completed NATA JUAREZ          * Acute on chronic combined systolic and diastolic congestive heart failure  CHF Pathway initiated  Last 2D echo March 2019 with EF 45% and diastolic dysfunction  BNP pending and CXR with pulmonary edema  IV diuresis with Lasix 40mg IV BID with good response. Diuresed 6L since admit. Now becoming volume contracted and symptomatically improved. Switched to home dose lasix of 40 mg daily .  Strict I&Os with daily weights.   Continue Aspirin 81mg PO daily  Continue Coreg 3.125mg PO BID  Continue Lisinopril 2.5mg PO daily -->    AAA (abdominal aortic aneurysm) without rupture  Plan as above.     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       Aneurysm of infrarenal abdominal aorta  CT Abdomen:  "There is a 5 x 4.8 cm infrarenal abdominal aortic aneurysm. There is extensive plaque and thrombus throughout the abdominal aorta which extends into the iliac arteries. This aneurysm extends over a craniocaudad length of 7.6 cm and terminates at the aortic bifurcation.  There is apparent high-grade narrowing at the origin of the left common. There is fusiform dilatation of both the " "right and left common iliac arteries measuring 2.2 and 1.7 cm in diameter respectively."  Vascular Surgery consulted - No acute intervention.  F/u in 3 months in clinic with aortic ultrasound    Constipation  Senna and miralax prescribed but has not had a bm yet  Trial of scheduled lactulose   Last BM yesterday  Resolved.       CAD (coronary artery disease)  Plan as above.       COPD (chronic obstructive pulmonary disease)  Plan as above.     BPH (benign prostatic hyperplasia)  Endorses urinary straining  Continue Flomax 0.4mg PO daily            Thrombocytopenia  Chronic and stable  Monitor for bleeding    Acute on chronic respiratory failure with hypoxia and hypercapnia  Chronic from tobacco abuse  Satting 97% on 2L NC - goal sats 88-92%  Started on Spiriva          CKD (chronic kidney disease) stage 3, GFR 30-59 ml/min  Lab Results   Component Value Date    CREATININE 1.4 04/11/2019     Sr Cr at baseline  Patient seems volume contracted on labs  Hold lasix and low dose lisinopril for now   Cont to monitor with daily labs   Avoid nephrotoxins.   Renally dose all medications   Monitor events that may lead to decreased renal perfusion (hypovolemia, hypotension, sepsis).   Monitor urine output  obstruction precipitates worsening in GFR.    Tobacco abuse  Encouraged cessation  Denies Nicotine patch            Peripheral vascular disease  Chronic and stable  Continue Aspirin 81mg PO daily  Continue Lipitor 80mg PO daily  Continue Plavix 75mg PO daily      History of MI (myocardial infarction)  Plan as above.       Mixed hyperlipidemia  Chronic and stable  Continue Lipitor 80mg PO daily      Hx of CABG  Per CAD       Chronic ischemic heart disease, unspecified  CAD s/p CABG  History of MI  Chronic and stable but endorses some chest pain - likely 2/2 pleural effusion  Trend troponin x 3  Continue Aspirin 81mg PO daily  Continue Coreg 3.125mg PO BID  Continue Lisinopril 2.5mg PO daily  Continue Lipitor 80mg PO " daily  Continue Plavix 75mg PO daily            Final Active Diagnoses:    Diagnosis Date Noted POA    PRINCIPAL PROBLEM:  Acute on chronic combined systolic and diastolic congestive heart failure [I50.43] 03/26/2019 Yes    Acute on chronic respiratory failure with hypoxia and hypercapnia [J96.21, J96.22] 04/05/2019 Yes    Thrombocytopenia [D69.6] 04/05/2019 Yes    BPH (benign prostatic hyperplasia) [N40.0] 04/05/2019 Yes    COPD (chronic obstructive pulmonary disease) [J44.9] 04/05/2019 Yes    CAD (coronary artery disease) [I25.10] 04/05/2019 Yes    Constipation [K59.00] 04/05/2019 Yes    Aneurysm of infrarenal abdominal aorta [I71.4] 04/05/2019 Yes    Benign hypertensive heart and kidney disease 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 CKD [I13.0] 04/05/2019 Yes    AAA (abdominal aortic aneurysm) without rupture [I71.4] 04/05/2019 Yes    CKD (chronic kidney disease) stage 3, GFR 30-59 ml/min [N18.3] 03/08/2019 Yes    Tobacco abuse [Z72.0]  Yes    History of MI (myocardial infarction) [I25.2] 03/07/2019 Not Applicable    Peripheral vascular disease [I73.9] 03/07/2019 Yes    Chronic ischemic heart disease, unspecified [I25.9] 07/25/2012 Yes    Mixed hyperlipidemia [E78.2] 07/25/2012 Yes    Hx of CABG [Z95.1] 07/25/2012 Not Applicable      Problems Resolved During this Admission:       Discharged Condition: good    Disposition: Home or Self Care    Follow Up:  Follow-up Information     Erwin Shrestha MD In 3 months.    Specialty:  Vascular Surgery  Why:  with AAA ultrasound  Contact information:  [facility] [address]  [phone]             [facility] In 1 week.    Why:  For follow up and review of hosptial course   Contact information:  [address]  [place] [facility] [address] [phone]                 Patient Instructions:      Ambulatory referral to Cardiology   Referral Priority: Routine Referral Type: Consultation   Referral Reason: Specialty Services Required    Requested Specialty: Cardiology   Number of Visits Requested: 1     Ambulatory referral to Nephrology   Referral Priority: Routine Referral Type: Consultation   Referral Reason: Specialty Services Required   Requested Specialty: Nephrology   Number of Visits Requested: 1     Ambulatory consult to Vascular Surgery   Referral Priority: Routine Referral Type: Surgical   Referral Reason: Specialty Services Required   Requested Specialty: Vascular Surgery   Number of Visits Requested: 1     Ambulatory referral to Urology   Referral Priority: Routine Referral Type: Consultation   Referral Reason: Specialty Services Required   Requested Specialty: Urology   Number of Visits Requested: 1     Diet Cardiac     Notify your health care provider if you experience any of the following:  difficulty breathing or increased cough     Activity as tolerated       Significant Diagnostic Studies: Labs:   BMP:   Recent Labs   Lab 04/10/19  0311 04/11/19  0704   GLU 85 76    134*   K 4.2 4.9    104   CO2 26 24   BUN 26* 17   CREATININE 1.8* 1.4   CALCIUM 9.2 9.4   MG 2.0  --     and CBC   Recent Labs   Lab 04/10/19  0311   WBC 4.13   HGB 12.3*   HCT 39.4*   *       Pending Diagnostic Studies:     None         Medications:  Reconciled Home Medications:      Medication List      START taking these medications    tiotropium 18 mcg inhalation capsule  Commonly known as:  SPIRIVA  Inhale 1 capsule (18 mcg total) into the lungs once daily. Controller        CONTINUE taking these medications    aspirin 81 MG EC tablet  Commonly known as:  ECOTRIN  Take 1 tablet (81 mg total) by mouth once daily.     atorvastatin 80 MG tablet  Commonly known as:  LIPITOR  Take 1 tablet (80 mg total) by mouth once daily.     carvedilol 3.125 MG tablet  Commonly known as:  COREG  Take 1 tablet (3.125 mg total) by mouth 2 (two) times daily.     clopidogrel 75 mg tablet  Commonly known as:  PLAVIX  Take 1 tablet (75 mg total) by mouth once  daily.     furosemide 40 MG tablet  Commonly known as:  LASIX  Take 1 tablet (40 mg total) by mouth once daily.     lisinopril 2.5 MG tablet  Commonly known as:  PRINIVIL,ZESTRIL  Take 1 tablet (2.5 mg total) by mouth once daily.     mirtazapine 15 MG tablet  Commonly known as:  REMERON  Take 15 mg by mouth every evening.     tamsulosin 0.4 mg Cap  Commonly known as:  FLOMAX  Take 1 capsule (0.4 mg total) by mouth once daily.            Indwelling Lines/Drains at time of discharge:   Lines/Drains/Airways          None          Time spent on the discharge of patient: 30 minutes  Patient was seen and examined on the date of discharge and determined to be suitable for discharge.         Juwan Shetty MD  Department of Hospital Medicine  [facility]

## 2019-04-11 NOTE — PLAN OF CARE
Problem: Adult Inpatient Plan of Care  Goal: Patient-Specific Goal (Individualization)  Outcome: Ongoing (interventions implemented as appropriate)  Patient remained in stable condition through shift. Remained free of falls and other injuries. Able to reposition self independently. Declined any pain or discomfort. Bed in locked and lowest position, call light in reach, all questions answered, declines any further needs at this time. Will continue to monitor.

## 2019-04-11 NOTE — PLAN OF CARE
CM called [facility] [phone] to set up transport home.  Patient to be picked up between 30 minutes and 3 hours.  Confirmation number is [id number].

## 2019-04-11 NOTE — PROGRESS NOTES
Pt is getting discharge but does not have a ride home yet. Pt stated that he will call his cousin, Dennie, to see if she can drive him home. Pt will call nurse later to notify nurse if he can get a ride home or not.

## 2019-04-12 NOTE — PLAN OF CARE
Plan is [facility]    Follow up appointment made with Dr. Erlin Templeton MD at [facility].       04/12/19 0800   Final Note   Assessment Type Final Discharge Note   Anticipated Discharge Disposition Home   Right Care Referral Info   Post Acute Recommendation No Care

## 2019-07-08 PROBLEM — I21.4 NSTEMI (NON-ST ELEVATED MYOCARDIAL INFARCTION): Status: ACTIVE | Noted: 2019-01-01

## 2019-07-08 NOTE — CARE UPDATE
Patient arrived to SICU [id number] intubated with a 7.5 ET tube secured at 23 cm at the gum line. Placed patient on ventilator at documented settings with no adverse reactions. Will continue to monitor

## 2019-07-08 NOTE — H&P
[facility]  Critical Care - Surgery  History & Physical    Patient Name: Yogesh Lima  MRN: [id number]  Admission Date: 7/7/2019  Code Status: Prior  Attending Physician: Hilton Hanson MD   Primary Care Provider: [facility]   Principal Problem: AAA (abdominal aortic aneurysm) without rupture    Subjective:     HPI:  63-year-old male with history of HTN, HLD, current smoker, CAD s/p multi-vessel CABG (1993), heart failure, COPD, and known AAA (discovered April 2019)  who presented as transfer from [facility] given symptomatic enlarging AAA. He presented to OSH this evening given abdominal pain of several days, worsening in nature, worst in LLQ and radiating to his back. He also was complaining of bilateral lower extremity edema, shortness of breath and dyspnea on exertion. BNP was 16,200. CT abdomen was performed given abdominal pain and history of AAA and his aneurysm was noted to be enlarged to 6.1cm from previous diameter of 4.8cm. Given this he was transferred to [facility] for emergent vascular surgery intervention. He's now s/p EVAR and remains intubated off pressors.     Hospital/ICU Course:  No notes on file    Follow-up For: Procedure(s) (LRB):  REPAIR-ANEURYSM-ABDOMINAL AORTIC-ENDOVASCULAR (AAA) (Bilateral)    Post-Operative Day: 1 Day Post-Op     Past Medical History:   Diagnosis Date    Coronary artery disease     Hyperlipidemia     Hypertension        History reviewed. No pertinent surgical history.    Review of patient's allergies indicates:  No Known Allergies    Family History     Problem Relation (Age of Onset)    No Known Problems Mother, Father        Tobacco Use    Smoking status: Current Every Day Smoker     Packs/day: 1.00     Years: 53.00     Pack years: 53.00     Types: Cigarettes    Smokeless tobacco: Never Used   Substance and Sexual Activity    Alcohol use: Not on file    Drug use: Not on file    Sexual activity: Not on file      Review of Systems    Unable to perform ROS: Intubated     Objective:     Vital Signs (Most Recent):  Temp: 97.6 °F (36.4 °C) (07/07/19 2330)  Pulse: 102 (07/07/19 2010)  Resp: 17 (07/07/19 2010)  BP: 127/81 (07/07/19 2010)  SpO2: 100 % (07/07/19 2318) Vital Signs (24h Range):  Temp:  [97.3 °F (36.3 °C)-97.6 °F (36.4 °C)] 97.6 °F (36.4 °C)  Pulse:  [101-146] 102  Resp:  [17-30] 17  SpO2:  [91 %-100 %] 100 %  BP: (113-157)/() 127/81     Weight: 62.7 kg (138 lb 3.7 oz)  Body mass index is 22.31 kg/m².      Intake/Output Summary (Last 24 hours) at 7/8/2019 0013  Last data filed at 7/7/2019 2300  Gross per 24 hour   Intake --   Output 1200 ml   Net -1200 ml     Physical Exam   Constitutional: He appears well-developed and well-nourished.   HENT:   Head: Normocephalic and atraumatic.   Eyes: Conjunctivae are normal.   Cardiovascular: Normal rate.   Pulses:       Dorsalis pedis pulses are 1+ on the right side, and 1+ on the left side.        Posterior tibial pulses are 1+ on the right side, and 1+ on the left side.   Pulmonary/Chest:   Mechanically ventilated.    Abdominal: Soft. There is no tenderness.   Neurological: He is alert.   Skin: Skin is warm and dry.   Psychiatric: He has a normal mood and affect. His behavior is normal.   Nursing note and vitals reviewed.    Vents:  Vent Mode: A/C (07/07/19 2318)  Ventilator Initiated: Yes (07/07/19 2318)  Set Rate: 16 bmp (07/07/19 2318)  Vt Set: 380 mL (07/07/19 2318)  PEEP/CPAP: 6 cmH20 (07/07/19 2318)  Oxygen Concentration (%): 51 (07/07/19 2330)  Peak Airway Pressure: 16 cmH2O (07/07/19 2318)  Plateau Pressure: 0 cmH20 (07/07/19 2318)  Total Ve: 6.23 mL (07/07/19 2318)    Lines/Drains/Airways     Drain                 Urethral Catheter 07/07/19 1610 Non-latex 16 Fr. less than 1 day          Airway                 Airway - Non-Surgical 07/07/19 2029 Endotracheal Tube less than 1 day          Peripheral Intravenous Line                 Peripheral IV - Single Lumen 07/07/19 1500 18 G Left  Forearm less than 1 day         Peripheral IV - Single Lumen 07/1955 18 G Right Antecubital less than 1 day              Significant Labs:    CBC/Anemia Profile:  Recent Labs   Lab 07/07/19  1509 07/07/19  2326   WBC 6.36 5.64  5.64   HGB 12.9* 11.3*  11.3*   HCT 39.3* 35.7*  35.7*   * 95*  95*   MCV 80* 82  82   RDW 16.6* 16.4*  16.4*        Chemistries:  Recent Labs   Lab 07/07/19  1509 07/07/19  2326    139  139   K 4.5 3.9  3.9    103  103   CO2 24 24  24   BUN 28* 26*  26*   CREATININE 1.58* 1.4  1.4   CALCIUM 9.2 8.6*  8.6*   ALBUMIN 3.7 2.6*  2.6*   PROT 7.8 6.1  6.1   BILITOT 0.7 0.6  0.6   ALKPHOS 81 72  72   ALT 30 21  21   AST 35 18  18   MG  --  1.9   PHOS  --  4.2     Significant Imaging: I have reviewed all pertinent imaging results/findings within the past 24 hours.    Assessment/Plan:     Aneurysm of infrarenal abdominal aorta  63-year-old male with acute on chronic CHF exacerbation found to have a AAA enlarged to greater than 6cm from previous now s/p EVAR.     Neuro:  -Propofol for sedation  -fentanyl for analgesia, pushes.     Cards:  Currently with CHF exacerbation.   -keep fluids going tonight  -will likely need to diurese after acute resuscitation is over  -continue statin, carvedilol, and ASA  -echo tomorrow  -keep systolics less than 140 and HR normal    Resp:  -keep intubated tonight on VC+ with 380 tidal volumes based on ideal body weight. Currently on minimal vent settings despite having CHF exacerbation.   -plan to extubate likely tomorrow  -daily cxr while intubated.     FENGI  -NPO while intubated  -replace lytes as needed  -GI ppx    Heme:  CBC stable post op. Doing well.   -trend H/H  -DVT ppx    Renal:  Baseline CKD with admitting creatinine of 1.4.   -trend renal function  -continue fluids at 50 overnight  -may need to start diuresis tomorrow as kidneys tolerate    Endo:  Blood sugars stable. No history of diabetes.   -monitor blood sugar.      ID:  -Continue prophylactic abx for 2 doses post op  -trend white count    Dispo: Continue ICU care while intubated.           Critical care was time spent personally by me on the following activities: development of treatment plan with patient or surrogate and bedside caregivers, discussions with consultants, evaluation of patient's response to treatment, examination of patient, ordering and performing treatments and interventions, ordering and review of laboratory studies, ordering and review of radiographic studies, pulse oximetry, re-evaluation of patient's condition.  This critical care time did not overlap with that of any other provider or involve time for any procedures.     Amol Ennis MD  Critical Care - Surgery  [facility]

## 2019-07-08 NOTE — CONSULTS
[facility]  Cardiology  Consult Note    Patient Name: Yogesh Lima  MRN: [id number]  Admission Date: 7/7/2019  Hospital Length of Stay: 1 days  Code Status: Prior   Attending Provider: Dr. Trisha Peterson  Consulting Provider: Elvis Muñoz MD  Primary Care Physician: [facility]  Principal Problem:AAA (abdominal aortic aneurysm) without rupture    Patient information was obtained from past medical records and ER records.     Inpatient consult to Cardiology  Consult performed by: Elvis Muñoz MD  Consult ordered by: Simona Lawson MD        Subjective:     Chief Complaint:  Troponin rise, acute decompensated heart failure     HPI:   63 M historyof CAD s/p CABG 1993, HFrEF (presumed 2/2 ICM last EF 42%), PVD, COPD, AAA presenting with abdominal pain s/p EVAR with course complicated by hypotension, tachycardia and EF down from baseline to 25%. Patient previously admitted for decompensated CHF in 4/2019. Found at that time to have infrarenal AAA for which surgical intervention deferred. Underwent medical mgmt of dCHF, discharged on Lasix 40 PO qd, low dose lisinopril and coreg 3.125 bid with unclear adherence, no follow up with cardiology noted. Patient now readmitted with abdominal pain, found to have expanding AAA, now s/p EVAR. Received 500cc LR bolus and NS infusion 50/hr perioperatively and esmolol gtt prior to surgery presumably to reduce sheer stress. Now noted this AM to have hypotension to MAPS 60 and Tachycardia to 110s. H/H has been stable, no fevers, no change in O2 requirement. Patient remains intubated and unable to provide further info, no family immediately available.    Past Medical History:   Diagnosis Date    Coronary artery disease     Hyperlipidemia     Hypertension        Past Surgical History:   Procedure Laterality Date    REPAIR-ANEURYSM-ABDOMINAL AORTIC-ENDOVASCULAR (AAA) Bilateral 7/7/2019    Performed by Hilton Hanson MD at  [facility] OR 2ND FLR       Review of patient's allergies indicates:  No Known Allergies    Current Facility-Administered Medications on File Prior to Encounter   Medication    [COMPLETED] iohexol (OMNIPAQUE 350) injection 75 mL    [DISCONTINUED] esmolol 2000 mg in sodium chloride 0.9% 100 mL (20 mg/mL)    [DISCONTINUED] esmolol injection     Current Outpatient Medications on File Prior to Encounter   Medication Sig    aspirin (ECOTRIN) 81 MG EC tablet Take 1 tablet (81 mg total) by mouth once daily.    atorvastatin (LIPITOR) 80 MG tablet Take 1 tablet (80 mg total) by mouth once daily.    carvedilol (COREG) 3.125 MG tablet Take 1 tablet (3.125 mg total) by mouth 2 (two) times daily.    clopidogrel (PLAVIX) 75 mg tablet Take 1 tablet (75 mg total) by mouth once daily.    furosemide (LASIX) 40 MG tablet Take 1 tablet (40 mg total) by mouth once daily.    lisinopril (PRINIVIL,ZESTRIL) 2.5 MG tablet Take 1 tablet (2.5 mg total) by mouth once daily.    mirtazapine (REMERON) 15 MG tablet Take 15 mg by mouth every evening.      polyethylene glycol (GLYCOLAX) 17 gram PwPk Take 17 g by mouth 2 (two) times daily as needed (constipation).    tamsulosin (FLOMAX) 0.4 mg Cap Take 1 capsule (0.4 mg total) by mouth once daily.    tiotropium (SPIRIVA) 18 mcg inhalation capsule Inhale 1 capsule (18 mcg total) into the lungs once daily. Controller     Family History     Problem Relation (Age of Onset)    No Known Problems Mother, Father        Tobacco Use    Smoking status: Current Every Day Smoker     Packs/day: 1.00     Years: 53.00     Pack years: 53.00     Types: Cigarettes    Smokeless tobacco: Never Used   Substance and Sexual Activity    Alcohol use: Not on file    Drug use: Not on file    Sexual activity: Not on file     Review of Systems   Unable to perform ROS: intubated     Objective:     Vital Signs (Most Recent):  Temp: (!) 101.4 °F (38.6 °C) (07/08/19 1630)  Pulse: 97 (07/08/19 1715)  Resp: 17 (07/08/19  1715)  BP: (!) 115/57 (07/08/19 1700)  SpO2: 98 % (07/08/19 1715) Vital Signs (24h Range):  Temp:  [97.3 °F (36.3 °C)-101.4 °F (38.6 °C)] 101.4 °F (38.6 °C)  Pulse:  [] 97  Resp:  [10-30] 17  SpO2:  [93 %-100 %] 98 %  BP: ()/(47-92) 115/57  Arterial Line BP: ()/(41-75) 122/66     Weight: 62.6 kg (138 lb)  Body mass index is 21.61 kg/m².    SpO2: 98 %  O2 Device (Oxygen Therapy): ventilator      Intake/Output Summary (Last 24 hours) at 7/8/2019 1813  Last data filed at 7/8/2019 1800  Gross per 24 hour   Intake 2657 ml   Output 4409 ml   Net -1752 ml       Lines/Drains/Airways     Drain                 Urethral Catheter 07/07/19 1610 Non-latex 16 Fr. 1 day         NG/OG Tube 07/08/19 0126 Center mouth less than 1 day          Airway                 Airway - Non-Surgical 07/07/19 2029 Endotracheal Tube less than 1 day          Arterial Line                 Arterial Line 07/08/19 0125 Left Radial less than 1 day          Peripheral Intravenous Line                 Peripheral IV - Single Lumen 07/07/19 1500 18 G Left Forearm 1 day         Peripheral IV - Single Lumen 07/1955 18 G Right Antecubital less than 1 day         Peripheral IV - Single Lumen 07/08/19 0029 20 G Right Forearm less than 1 day                Physical Exam   Constitutional:   dissheveled   HENT:   Head: Normocephalic and atraumatic.   Neck: No JVD present.   JVD at ~8cm   Cardiovascular: Normal rate and regular rhythm. Exam reveals no gallop and no friction rub.   No murmur heard.  Pulmonary/Chest: No stridor.   Abdominal: Soft. Bowel sounds are normal. He exhibits no distension and no mass. There is no tenderness. There is no rebound and no guarding.   Lymphadenopathy:     He has no cervical adenopathy.   Skin: Skin is warm and dry.       Significant Labs:   BMP:   Recent Labs   Lab 07/07/19  2326 07/08/19  0400 07/08/19  1143   GLU 91  91 81 69*     139 141 141   K 3.9  3.9 4.0 3.2*     103 104 102   CO2 24   24 26 29   BUN 26*  26* 24* 22   CREATININE 1.4  1.4 1.3 1.4   CALCIUM 8.6*  8.6* 8.2* 8.7   MG 1.9 2.3 2.0   , CBC   Recent Labs   Lab 07/07/19  1509 07/07/19  2326 07/08/19  0400   WBC 6.36 5.64  5.64 5.73   HGB 12.9* 11.3*  11.3* 10.5*   HCT 39.3* 35.7*  35.7* 33.1*   * 95*  95* 90*   , Lipid Panel No results for input(s): CHOL, HDL, LDLCALC, TRIG, CHOLHDL in the last 48 hours. and Troponin   Recent Labs   Lab 07/07/19  1509 07/08/19  1143   TROPONINI 0.022 0.093*       Significant Imaging: Echocardiogram:   Transthoracic echo (TTE) complete (Cupid Only):   Results for orders placed or performed during the hospital encounter of 07/07/19   Transthoracic echo (TTE) 2D with Color Flow   Result Value Ref Range    Ascending aorta 3.07 cm    STJ 3.14 cm    AV mean gradient 2 mmHg    Ao peak aaliyah 0.90 m/s    Ao VTI 12.22 cm    IVS 1.01 0.6 - 1.1 cm    LA size 4.50 cm    Left Atrium Major Axis 5.88 cm    Left Atrium Minor Axis 5.21 cm    LVIDD 5.76 3.5 - 6.0 cm    LVIDS 4.88 (A) 2.1 - 4.0 cm    LVOT diameter 2.22 cm    LVOT peak VTI 10.53 cm    PW 0.62 0.6 - 1.1 cm    RA Major Axis 5.72 cm    RA Width 3.28 cm    RVDD 2.62 cm    Sinus 3.18 cm    TAPSE 1.33 cm    TDI LATERAL 0.17 m/s    TDI SEPTAL 0.09 m/s    LA WIDTH 4.05 cm    LV Diastolic Volume 164.01 mL    LV Systolic Volume 111.91 mL    LVOT peak aaliyah 0.74 m/s    FS 15 %    LA volume 85.59 cm3    LV mass 177.38 g    Left Ventricle Relative Wall Thickness 0.22 cm    AV valve area 3.33 cm2    AV Velocity Ratio 0.82     AV index (prosthetic) 0.86     Mean e' 0.13 m/s    LVOT area 3.9 cm2    LVOT stroke volume 40.74 cm3    AV peak gradient 3 mmHg    LV Systolic Volume Index 64.8 mL/m2    LV Diastolic Volume Index 94.97 mL/m2    LA Volume Index 49.6 mL/m2    LV Mass Index 103 g/m2    BSA 1.72 m2    Narrative    · Severely decreased left ventricular systolic function with evidence of   basal inferior akinesis. The estimated ejection fraction is 25%.  · Mildly  to moderately reduced right ventricular systolic function.  · Left ventricular diastolic dysfunction.  · Severe left atrial enlargement.  · Mild mitral regurgitation.  · Mechanically ventilated; cannot use inferior caval vein diameter to   estimate central venous pressure.       and EKG: NSR w PVCs, new TWI in anterolateral leads    Assessment and Plan:     64 y/o M with history of CAD s/p CABG 1993, HFrEF (presumed 2/2 ICM last EF 42%), PVD, COPD, AAA presenting with abdominal pain s/p EVAR with course complicated by hypotension (possible shock) in setting of decompensated systolic heart failure and NSTEMI.    NSTEMI  Unclear etiology, possible 2/2 to demand given hypotension, though ischemia itself may be driving his hypotension. EKG changes (anterolateral TWIs), trop elevation noted.    - s/p , ordered for ASA 81 qd  - continue high intenity statin  - heparin gtt ordered, will continue at least 48 hrs (discussed with surgery)  - holding on BB for now given hypotension, will consider in AM  - Will consider for LHC when more stable. May also have to consider in setting of worsening hemodynamic instability of workup suggestive of cardiogenic shock    Acute on chronic combined systolic and diastolic congestive heart failure  Presumed 2/2 ICM in setting of CAD, prior CABG. Possibly exacerbated by stress of surgery, +/- IVF and beta blockade in anticipation of AAA repair. Unknown functional status at baseline    RECS  - ACS protocol ordered as above  - Would continue diuresis with Lasix IV 40mg bid (ordered)  - Strict I/Os  - please obtain bid lytes, replete K>4.5, Mg>2.5 in setting of NSVT  - Will DC coreg and hold on additional goal directed medical therapy given decompensation and possible shock (unable to verify etiology without CVPs, mVO2)  - Would wean dobutamine as tolerated        VTE Risk Mitigation (From admission, onward)        Ordered     heparin 25,000 units in dextrose 5% (100 units/ml) IV bolus  from bag INITIAL BOLUS (max bolus 4000 units)  Once      07/08/19 1811     heparin 25,000 units in dextrose 5% 250 mL (100 units/mL) infusion LOW INTENSITY nomogram - [facility]  Continuous      07/08/19 1811     heparin 25,000 units in dextrose 5% (100 units/ml) IV bolus from bag - ADDITIONAL PRN BOLUS - 60 units/kg (max bolus 4000 units)  As needed (PRN)      07/08/19 1811     heparin 25,000 units in dextrose 5% (100 units/ml) IV bolus from bag - ADDITIONAL PRN BOLUS - 30 units/kg (max bolus 4000 units)  As needed (PRN)      07/08/19 1811     heparin (porcine) injection 5,000 Units  Every 8 hours      07/07/19 2330     IP VTE HIGH RISK PATIENT  Once      07/07/19 2330          Thank you for your consult. I will follow-up with patient. Please contact us if you have any additional questions.     Patient seen and discussed with attending, Dr. Peterson.    Elvis Muñoz MD  Cardiology   [facility]

## 2019-07-08 NOTE — PLAN OF CARE
Problem: Adult Inpatient Plan of Care  Goal: Plan of Care Review  Outcome: Ongoing (interventions implemented as appropriate)  POC reviewed with patient who does follow commands but gets agitated when off of sedation. Urine output has been adequate. Electrolytes replaced. Skin breakdown noted on right side of buttocks, foam placed. Pain being controlled with PRN pain medication. Pt remained free from falls throughout the shift VSS. No distress noted, will continue to monitor.

## 2019-07-08 NOTE — PROGRESS NOTES
Pharmacokinetic Initial Assessment: IV Vancomycin    Assessment/Plan:    Initiate intravenous vancomycin with loading dose of 1250   mg once followed by a maintenance dose of vancomycin 1000mg IV every 24 hours  Desired empiric serum trough concentration is 10 to 20 mcg/mL.  Draw vancomycin trough level 30 min prior to third dose on 7/10 at approximately 1600  Pharmacy will continue to follow and monitor vancomycin.      Please contact pharmacy at extension [phone]   with any questions regarding this assessment.     Thank you for the consult,   Homer Peterson     Patient brief summary:  Yogesh Lima is a 63 y.o. male initiated on antimicrobial therapy with IV Vancomycin for treatment of suspected lower respiratory infection    Drug Allergies:   Review of patient's allergies indicates:  No Known Allergies    Actual Body Weight:   62.6kg      Renal Function:   Estimated Creatinine Clearance: 47.8 mL/min (based on SCr of 1.4 mg/dL).,     Dialysis Method (if applicable):      CBC (last 72 hours):  Recent Labs   Lab Result Units 07/07/19  1509 07/07/19  2326 07/08/19  0400   WBC K/uL 6.36 5.64  5.64 5.73   Hemoglobin g/dL 12.9* 11.3*  11.3* 10.5*   Hematocrit % 39.3* 35.7*  35.7* 33.1*   Platelets K/uL 138* 95*  95* 90*   Gran% % 67.3 65.3  65.3 66.6   Lymph% % 16.7* 19.7  19.7 16.8*   Mono% % 15.7* 12.1  12.1 15.2*   Eosinophil% % 0.3 0.4  0.4 0.2   Basophil% % 0.5 0.7  0.7 0.5   Differential Method  Automated Automated  Automated Automated       Metabolic Panel (last 72 hours):  Recent Labs   Lab Result Units 07/07/19  1509 07/07/19  1612 07/07/19  2326 07/08/19  0400 07/08/19  1143   Sodium mmol/L 143  --  139  139 141 141   Potassium mmol/L 4.5  --  3.9  3.9 4.0 3.2*   Chloride mmol/L 107  --  103  103 104 102   CO2 mmol/L 24  --  24  24 26 29   Glucose mg/dL 111*  --  91  91 81 69*   Glucose, UA   --  Negative  --   --   --    BUN, Bld mg/dL 28*  --  26*  26* 24* 22   Creatinine mg/dL 1.58*  --  1.4   1.4 1.3 1.4   Albumin g/dL 3.7  --  2.6*  2.6* 2.4*  --    Total Bilirubin mg/dL 0.7  --  0.6  0.6 0.3  --    Alkaline Phosphatase U/L 81  --  72  72 65  --    AST U/L 35  --  18  18 13  --    ALT U/L 30  --  21  21 17  --    Magnesium mg/dL  --   --  1.9 2.3 2.0   Phosphorus mg/dL  --   --  4.2 4.0 3.6       Drug levels (last 3 results):  No results for input(s): VANCOMYCINRA, VANCOMYCINPE, VANCOMYCINTR in the last 72 hours.    Microbiologic Results:  Microbiology Results (last 7 days)     Procedure Component Value Units Date/Time    Blood culture [[id number]] Collected:  07/08/19 1715    Order Status:  Sent Specimen:  Blood from Peripheral, Antecubital, Left Updated:  07/08/19 1721    Blood culture [[id number]]     Order Status:  No result Specimen:  Blood     Culture, VAP (BAL) [[id number]]     Order Status:  No result Specimen:  Bronchial Alveolar Lavage

## 2019-07-08 NOTE — ASSESSMENT & PLAN NOTE
64 y/o M with history of CAD s/p CABG 1993, HFrEF (presumed 2/2 ICM last EF 42%), PVD, COPD, AAA presenting with abdominal pain s/p EVAR with course complicated by hypotension (possible shock) in setting of decompensated systolic heart failure and NSTEMI.      Presumed 2/2 ICM in setting of CAD, prior CABG. Unknown functional status at baseline    RECS  - Would continue diuresis with Lasix IV 40mg bid (ordered)  - Strict I/Os  - please obtain bid lytes, replete K>4.5, Mg>2.5 in setting of NSVT  - Will DC coreg and hold on additional goal directed medical therapy given decompensation and possible shock (unable to verify etiology without CVPs, mVO2)  - Would wean dobutamine as tolerated

## 2019-07-08 NOTE — HPI
62yo M with medical history significant for HTN, HLD, current smoker, CAD s/p multi-vessel CABG (1993), heart failure, COPD, and known AAA (discovered April 2019)  who presented as transfer from OSH given symptomatic enlarging AAA. He presented to OSH this evening given abdominal pain of several days, worsening in nature, worst in LLQ and radiating to his back. He also was complaining of bilateral lower extremity edema, shortness of breath and dyspnea on exertion. BNP was 16,200. CT abdomen was performed given abdominal pain and history of AAA and his aneurysm was noted to be enlarged to 6.1cm from previous diameter of 4.8cm. Given this he was transferred to [facility] [place] emergent vascular surgery intervention.

## 2019-07-08 NOTE — PROGRESS NOTES
20 beat run of vtach on tele. MD notified and to bedside. Labs sent, EKG ordered. Will continue to monitor.

## 2019-07-08 NOTE — ASSESSMENT & PLAN NOTE
64yo M with multiple medical co-morbidities who presented as transfer from OSH given symptomatic, enlarging AAA    - Patient seen and examined, labs and imaging reviewed, discussed with staff  - Given AAA with significant enlargement since April 2019 and diameter >6cm on CT as well as symptoms of abdominal pain, he requires emergent intervention. We will attempt to proceed with endovascular intervention, but may need to convert to open repair.   - Findings discussed with patient, informed of the severity of his diagnosis as well as the risks and benefits of the procedure, he is willing to proceed, consents signed  - Type & screen sent, blood on hold  - Will need ICU bed following procedure

## 2019-07-08 NOTE — ED TRIAGE NOTES
Pt. Presents to ED today with ems as transfer from [place] for vascular consult secondary to increasing AAA. Esmolol gtt currently infusing at 125mcg/kg/min. Denies cp, sob, n/v/d, other complaints.

## 2019-07-08 NOTE — BRIEF OP NOTE
Brief Operative Note  Date: 07/07/2019    Pre-op Diagnosis:  AAA (abdominal aortic aneurysm) without rupture [I71.4]; Symptomatic 6.4 cm AAA, rapid growth from 5.0 cm in April 2019 to symptomatic 6.4cm July 2019    Post-op Diagnosis:  Same    Procedure(s):  1) Emergent percutaneous EVAR Medtronic Endurant II 32 x 16 x 166 mm from R CFA, 16 x 20 x 156mm  2) Extension into R EIA with Medtronic limb 16 x 10 x 156mm  3) Percutaneous femoral access x2; perclose sutures x3 R CFA; x2 L CFA    Surgeon: Hilton Hanson MD FACS    Assistant: PO Loredo MD; ELENA Ng    Anesthesia: General    Findings/Key Components:  Successful treatment of AAA; large type II paired lumbar endoleaks noted and a second lumbar as well    2+ femoral pulses  Monophasic pedal doppler signals (as baseline)    Contrast used: 56 ml  Flouro time: 21.2  Radiation: 584.4 mGy    ASA, statin  Check popliteal u/s; ABIs; carotid u/s post-op  SICU - will remain extubated given acute CHF presentation    EBL: 50 ml      Specimens (From admission, onward)    None        I attest to being present for the procedure and performing the case.  Hilton Hanson MD FACS

## 2019-07-08 NOTE — HPI
63 M historyof CAD s/p CABG 1993, HFrEF (presumed 2/2 ICM last EF 42%), PVD, COPD, AAA presenting with abdominal pain s/p EVAR with course complicated by hypotension, tachycardia and EF down from baseline to 25%. Patient previously admitted for decompensated CHF in 4/2019. Found at that time to have infrarenal AAA for which surgical intervention deferred. Underwent medical mgmt of dCHF, discharged on Lasix 40 PO qd, low dose lisinopril and coreg 3.125 bid with unclear adherence, no follow up with cardiology noted. Patient now readmitted with abdominal pain, found to have expanding AAA, now s/p EVAR. Received 500cc LR bolus and NS infusion 50/hr perioperatively and esmolol gtt prior to surgery presumably to reduce sheer stress. Now noted this AM to have hypotension to MAPS 60 and Tachycardia to 110s. H/H has been stable, no fevers, no change in O2 requirement. Patient remains intubated and unable to provide further info, no family immediately available.

## 2019-07-08 NOTE — TRANSFER OF CARE
Anesthesia Transfer of Care Note    Patient: Yogesh Janie    Procedure(s) Performed: Procedure(s) (LRB):  REPAIR-ANEURYSM-ABDOMINAL AORTIC-ENDOVASCULAR (AAA) (Bilateral)    Patient location: ICU    Anesthesia Type: general    Transport from OR: Transported from OR intubated on 100% O2 by AMBU with adequate controlled ventilation. Continuos invasive BP monitoring in transport. Continuous SpO2 monitoring in transport. Continuous ECG monitoring in transport. Upon arrival to PACU/ICU, patient attached to ventilator and auscultated to confirm bilateral breath sounds and adequate TV    Post pain: adequate analgesia    Post assessment: no apparent anesthetic complications and tolerated procedure well    Post vital signs: stable    Level of consciousness: sedated    Nausea/Vomiting: no nausea/vomiting    Complications: none    Transfer of care protocol was followed      Last vitals:   Vepc=277/73  Hr=80  Dor7=031%  Resp=16 on vent

## 2019-07-08 NOTE — PROGRESS NOTES
No left foot DP pulse noted.  Dr. Ng and Dr. Solitario notified.  No new orders at this time.  Will continue to monitor.

## 2019-07-08 NOTE — ASSESSMENT & PLAN NOTE
62yo M with multiple medical co-morbidities who presented as transfer from OSH given symptomatic, enlarging AAA now s/p emergent EVAR on 7/7    Neuro: Wean sedation this AM for extubation, prn pain control  Resp: Plan for extubation later this morning following SBT and ECHO, plan for duonebs pre-extubation and then q4 in setting of known COPD, daily CXR while intubated, ABGs prn, continuous pulse ox  CV: Obtain TTE this AM given known history of CHF and significantly elevated BNP on admit, review home BP meds as may need re-started once off sedation; daily ASA, statin; SBP < 140, HR < 100 - not currently requiring any vasoactive meds; will obtain VAS imaging today - arterial duplex US, carotid US  FEN/GI: NPO for now in anticipation of extubation later today, if were to remain intubated ok for TFs, ok to advance diet per SICU team following extubation, replete lytes prn  Renal: 40 lasix x 1 this AM, monitor response, likely will need additional dose later today as is on home lasix and came in overloaded, monitor BUN/Cr, maintain arreola for accurate I/Os for now - has previously had issues with retention  Heme/ID: H/H stable, no leukocytosis, continue to monitor, radha-op Ancef  Endo: POCT blood glucose, no current issues, continue to monitor  Msk: No issues, continue to monitor    Ppx: SQH  Dispo: Continue ICU care, wean to extubate, will need PT/OT

## 2019-07-08 NOTE — ED NOTES
Pt. Taken with gen surg and RN on cardiac and o2 monitor to 2nd floor surgery. Report and care to CRNA and team. No distress noted.

## 2019-07-08 NOTE — PROGRESS NOTES
[facility]  Vascular Surgery  Progress Note    Patient Name: Yogesh Lima  MRN: [id number]  Admission Date: 7/7/2019  Primary Care Provider: [facility]    Subjective:     Interval History: Admitted to ICU following emergent EVAR overnight, remained intubated following OR. Did well on minimal vent settings with propofol and fentanyl for sedation. Adequate UOP noted overnight.     Post-Op Info:  Procedure(s) (LRB):  REPAIR-ANEURYSM-ABDOMINAL AORTIC-ENDOVASCULAR (AAA) (Bilateral)   1 Day Post-Op     Medications Prior to Admission   Medication Sig Dispense Refill Last Dose    aspirin (ECOTRIN) 81 MG EC tablet Take 1 tablet (81 mg total) by mouth once daily. 30 tablet 1 Past Week    atorvastatin (LIPITOR) 80 MG tablet Take 1 tablet (80 mg total) by mouth once daily. 30 tablet 1 Past Week    carvedilol (COREG) 3.125 MG tablet Take 1 tablet (3.125 mg total) by mouth 2 (two) times daily. 60 tablet 1 7/7/2019    clopidogrel (PLAVIX) 75 mg tablet Take 1 tablet (75 mg total) by mouth once daily. 30 tablet 1 Past Week    furosemide (LASIX) 40 MG tablet Take 1 tablet (40 mg total) by mouth once daily. 30 tablet 1 Past Week    lisinopril (PRINIVIL,ZESTRIL) 2.5 MG tablet Take 1 tablet (2.5 mg total) by mouth once daily. 30 tablet 1 Past Week    mirtazapine (REMERON) 15 MG tablet Take 15 mg by mouth every evening.     Past Week    polyethylene glycol (GLYCOLAX) 17 gram PwPk Take 17 g by mouth 2 (two) times daily as needed (constipation). 30 each 0 Past Week    tamsulosin (FLOMAX) 0.4 mg Cap Take 1 capsule (0.4 mg total) by mouth once daily. 30 capsule 1 7/7/2019    tiotropium (SPIRIVA) 18 mcg inhalation capsule Inhale 1 capsule (18 mcg total) into the lungs once daily. Controller 30 capsule 11 Past Week       Review of patient's allergies indicates:  No Known Allergies    Past Medical History:   Diagnosis Date    Coronary artery disease     Hyperlipidemia     Hypertension       Past Surgical History:   Procedure Laterality Date    REPAIR-ANEURYSM-ABDOMINAL AORTIC-ENDOVASCULAR (AAA) Bilateral 7/7/2019    Performed by Hilton Hanson MD at [facility] OR [address] [place] [address]     Family History     Problem Relation (Age of Onset)    No Known Problems Mother, Father        Tobacco Use    Smoking status: Current Every Day Smoker     Packs/day: 1.00     Years: 53.00     Pack years: 53.00     Types: Cigarettes    Smokeless tobacco: Never Used   Substance and Sexual Activity    Alcohol use: Not on file    Drug use: Not on file    Sexual activity: Not on file          Objective:     Vital Signs (Most Recent):  Temp: 98.3 °F (36.8 °C) (07/08/19 0700)  Pulse: (!) 114 (07/08/19 0800)  Resp: 15 (07/08/19 0800)  BP: 110/76 (07/08/19 0730)  SpO2: 95 % (07/08/19 0800) Vital Signs (24h Range):  Temp:  [97.3 °F (36.3 °C)-98.3 °F (36.8 °C)] 98.3 °F (36.8 °C)  Pulse:  [] 114  Resp:  [10-30] 15  SpO2:  [91 %-100 %] 95 %  BP: (110-157)/() 110/76  Arterial Line BP: (106-135)/(53-75) 135/63     Date 07/08/19 0700 - 07/09/19 0659   Shift 5160-3194 0390-1771 4785-3774 24 Hour Total   INTAKE   Shift Total(mL/kg)       OUTPUT   Urine(mL/kg/hr) 950   950   Drains 150   150   Shift Total(mL/kg) 1100(17.5)   1100(17.5)   Weight (kg) 62.7 62.7 62.7 62.7       Physical Exam   Constitutional: He appears cachectic.   Disheveled    HENT:   Head: Normocephalic and atraumatic.   Nose: Nose normal.   Eyes: Pupils are equal, round, and reactive to light. Conjunctivae and EOM are normal. No scleral icterus.   Neck: Normal range of motion. No thyromegaly present.   Cardiovascular: Normal rate and regular rhythm. Exam reveals no gallop and no friction rub.   No murmur heard.  Pulses:       Radial pulses are 2+ on the right side, and 2+ on the left side.        Femoral pulses are 2+ on the right side, and 2+ on the left side.  PTs dopplerable in bilateral legs  DP with weak monophasic signal in right leg, non-dopplerable in left    Pulmonary/Chest:   Vent Mode: A/C  Oxygen Concentration (%):  (49-60) 49  Resp Rate Total:  (16 br/min-26 br/min) 21 br/min  Vt Set:  (380 mL) 380 mL  PEEP/CPAP:  (5 cmH20-6 cmH20) 5 cmH20  Mean Airway Pressure:  (7 cmH20-9.6 cmH20) 7.1 cmH20   Abdominal: Soft. He exhibits no distension. Mass:    There is no tenderness.   Genitourinary:   Genitourinary Comments: Bilateral groin sites c/d/i; no evidence of hematoma    Musculoskeletal: Normal range of motion.   Lymphadenopathy:     He has no cervical adenopathy.   Neurological:   sedated   Skin: Skin is warm and dry. No rash noted.       Significant Labs:  CBC:   Recent Labs   Lab 07/08/19  0400   WBC 5.73   RBC 4.10*   HGB 10.5*   HCT 33.1*   PLT 90*   MCV 81*   MCH 25.6*   MCHC 31.7*     CMP:   Recent Labs   Lab 07/08/19  0400   GLU 81   CALCIUM 8.2*   ALBUMIN 2.4*   PROT 5.5*      K 4.0   CO2 26      BUN 24*   CREATININE 1.3   ALKPHOS 65   ALT 17   AST 13   BILITOT 0.3       Significant Diagnostics:  I have reviewed all pertinent imaging results/findings within the past 24 hours.    Assessment/Plan:     Aneurysm of infrarenal abdominal aorta  62yo M with multiple medical co-morbidities who presented as transfer from OSH given symptomatic, enlarging AAA now s/p emergent EVAR on 7/7    Neuro: Wean sedation this AM for extubation, prn pain control  Resp: Plan for extubation later this morning following SBT and ECHO, plan for duonebs pre-extubation and then q4 in setting of known COPD, daily CXR while intubated, ABGs prn, continuous pulse ox  CV: Obtain TTE this AM given known history of CHF and significantly elevated BNP on admit, review home BP meds as may need re-started once off sedation; daily ASA, statin; SBP < 140, HR < 100 - not currently requiring any vasoactive meds; will obtain VAS imaging today - arterial duplex US, carotid US  FEN/GI: NPO for now in anticipation of extubation later today, if were to remain intubated ok for TFs, ok to advance  diet per SICU team following extubation, replete lytes prn  Renal: 40 lasix x 1 this AM, monitor response, likely will need additional dose later today as is on home lasix and came in overloaded, monitor BUN/Cr, maintain arreola for accurate I/Os for now - has previously had issues with retention  Heme/ID: H/H stable, no leukocytosis, continue to monitor, radha-op Ancef  Endo: POCT blood glucose, no current issues, continue to monitor  Msk: No issues, continue to monitor    Ppx: SQH  Dispo: Continue ICU care, wean to extubate, will need PT/OT          Carmen Ng MD  Vascular Surgery  [facility]    Vascular Attending  Presented with Symptomatic 6.4 cm AAA, rapid growth from 5.0 cm in April 2019 to symptomatic 6.4cm July 2019  POD#1 s/p   1) Emergent percutaneous EVAR Medtronic Endurant II 32 x 16 x 166 mm from R CFA, 16 x 20 x 156mm  2) Extension into R EIA with Medtronic limb 16 x 10 x 156mm  3) Percutaneous femoral access x2; perclose sutures x3 R CFA; x2 L CFA     Findings/Key Components:  Successful treatment of AAA; large type II paired lumbar endoleaks noted and a second lumbar as well     ASA, statin  Check popliteal u/s; ABIs; carotid u/s post-op  Has CRI - Cr 1.4 this AM; used 60 ml contrast  W/u thrombocytopenia (? Splenomegaly from EtOH or other etiology)    Hilton Hanson MD FACS  Vascular/Endovascular Surgery

## 2019-07-08 NOTE — ASSESSMENT & PLAN NOTE
63-year-old male with acute on chronic CHF exacerbation found to have a AAA enlarged to greater than 6cm from previous now s/p EVAR.     Neuro:  -Propofol for sedation  -fentanyl for analgesia, pushes.     Cards:  Currently with CHF exacerbation.   -keep fluids going tonight  -will likely need to diurese after acute resuscitation is over  -continue statin, carvedilol, and ASA  -echo tomorrow  -keep systolics less than 140 and HR normal    Resp:  -keep intubated tonight on VC+ with 380 tidal volumes based on ideal body weight. Currently on minimal vent settings despite having CHF exacerbation.   -plan to extubate likely tomorrow  -daily cxr while intubated.     FENGI  -NPO while intubated  -replace lytes as needed  -GI ppx    Heme:  CBC stable post op. Doing well.   -trend H/H  -DVT ppx    Renal:  Baseline CKD with admitting creatinine of 1.4.   -trend renal function  -continue fluids at 50 overnight  -may need to start diuresis tomorrow as kidneys tolerate    Endo:  Blood sugars stable. No history of diabetes.   -monitor blood sugar.     ID:  -Continue prophylactic abx for 2 doses post op  -trend white count    Dispo: Continue ICU care while intubated.

## 2019-07-08 NOTE — SUBJECTIVE & OBJECTIVE
Medications:  Continuous Infusions:   esmolol Stopped (07/07/19 2033)    fentanyl 150 mcg/hr (07/08/19 0800)    lactated ringers 50 mL/hr at 07/08/19 0800    propofol 50 mcg/kg/min (07/08/19 0800)     Scheduled Meds:   albuterol-ipratropium  3 mL Nebulization Q4H    aspirin  325 mg Oral Daily    atorvastatin  80 mg Oral Daily    carvedilol  3.125 mg Oral BID    ceFAZolin (ANCEF) IVPB  2 g Intravenous Q8H    famotidine  20 mg Oral BID    heparin (porcine)  5,000 Units Subcutaneous Q8H    mupirocin  1 g Nasal BID    tamsulosin  0.4 mg Oral Daily     PRN Meds:HYDROcodone-acetaminophen     Objective:     Vital Signs (Most Recent):  Temp: 98.3 °F (36.8 °C) (07/08/19 0700)  Pulse: (!) 114 (07/08/19 0800)  Resp: 15 (07/08/19 0800)  BP: 110/76 (07/08/19 0730)  SpO2: 95 % (07/08/19 0800) Vital Signs (24h Range):  Temp:  [97.3 °F (36.3 °C)-98.3 °F (36.8 °C)] 98.3 °F (36.8 °C)  Pulse:  [] 114  Resp:  [10-30] 15  SpO2:  [91 %-100 %] 95 %  BP: (110-157)/() 110/76  Arterial Line BP: (106-135)/(53-75) 135/63     Date 07/08/19 0700 - 07/09/19 0659   Shift 7344-9961 9614-4266 0108-4219 24 Hour Total   INTAKE   Shift Total(mL/kg)       OUTPUT   Urine(mL/kg/hr) 950   950   Drains 150   150   Shift Total(mL/kg) 1100(17.5)   1100(17.5)   Weight (kg) 62.7 62.7 62.7 62.7       Physical Exam   Constitutional: He appears cachectic.   Disheveled    HENT:   Head: Normocephalic and atraumatic.   Nose: Nose normal.   Eyes: Pupils are equal, round, and reactive to light. Conjunctivae and EOM are normal. No scleral icterus.   Neck: Normal range of motion. No thyromegaly present.   Cardiovascular: Normal rate and regular rhythm. Exam reveals no gallop and no friction rub.   No murmur heard.  Pulses:       Radial pulses are 2+ on the right side, and 2+ on the left side.        Femoral pulses are 2+ on the right side, and 2+ on the left side.  PTs dopplerable in bilateral legs  DP with weak monophasic signal in right leg,  non-dopplerable in left   Pulmonary/Chest:   Vent Mode: A/C  Oxygen Concentration (%):  (39-97) 40  Resp Rate Total:  (16 br/min-26 br/min) 18 br/min  Vt Set:  (380 mL) 380 mL  PEEP/CPAP:  (5 cmH20) 5 cmH20  Mean Airway Pressure:  (6.8 cmH20-8.8 cmH20) 8.8 cmH20   Abdominal: Soft. He exhibits no distension. Mass:    There is no tenderness.   Genitourinary:   Genitourinary Comments: Bilateral groin sites c/d/i; no evidence of hematoma    Musculoskeletal: Normal range of motion.   Lymphadenopathy:     He has no cervical adenopathy.   Neurological:   sedated   Skin: Skin is warm and dry. No rash noted.       Significant Labs:  CBC:   Recent Labs   Lab 07/09/19  0251   WBC 16.14*   RBC 4.67   HGB 12.0*   HCT 37.0*   PLT 92*   MCV 79*   MCH 25.7*   MCHC 32.4     CMP:   Recent Labs   Lab 07/09/19  0355   GLU 84   CALCIUM 8.6*   ALBUMIN 2.4*   PROT 6.4      K 4.3   CO2 26   CL 99   BUN 21   CREATININE 1.4   ALKPHOS 68   ALT 7*   AST 23   BILITOT 0.9       Significant Diagnostics:  I have reviewed all pertinent imaging results/findings within the past 24 hours.

## 2019-07-08 NOTE — ED PROVIDER NOTES
Source of History:  Patient    Chief complaint:  [facility] Transfer (Patient transferred from [facility] for Vascular Surgery consult, eval of AAA. )      HPI:  Yogesh Lima is a 63 y.o. male presenting with AAA diagnosed at [facility].  Plan OR.      ROS: As per HPI and below:  General: No fever.  No chills.  Eyes: No visual changes.  Head: No headache.    Integument: No rashes or lesions.  Chest: Notes shortness of breath.  Cardiovascular: No chest pain.  Abdomen: Notes abdominal pain.  No nausea or vomiting.  Urinary: Notes difficulty urinating.  Neurologic: No focal weakness.  No numbness.  Hematologic: No easy bruising.  Endocrine: No excessive thirst.      Review of patient's allergies indicates:  No Known Allergies    Current Facility-Administered Medications on File Prior to Encounter   Medication Dose Route Frequency Provider Last Rate Last Dose    [COMPLETED] furosemide injection 80 mg  80 mg Intravenous ED 1 Time Jacek Chavez MD   80 mg at 07/07/19 1511    [COMPLETED] iohexol (OMNIPAQUE 350) injection 75 mL  75 mL Intravenous ONCE PRN Jacek Chavez MD   75 mL at 07/07/19 1852    [DISCONTINUED] esmolol 2000 mg in sodium chloride 0.9% 100 mL (20 mg/mL)  125 mcg/kg/min Intravenous Continuous Jacek Chavez MD 23.5 mL/hr at 07/07/19 1900 125 mcg/kg/min at 07/07/19 1900    [DISCONTINUED] esmolol injection  125 mcg/kg/min Intravenous Continuous Jacek Chavez MD         Current Outpatient Medications on File Prior to Encounter   Medication Sig Dispense Refill    aspirin (ECOTRIN) 81 MG EC tablet Take 1 tablet (81 mg total) by mouth once daily. 30 tablet 1    atorvastatin (LIPITOR) 80 MG tablet Take 1 tablet (80 mg total) by mouth once daily. 30 tablet 1    carvedilol (COREG) 3.125 MG tablet Take 1 tablet (3.125 mg total) by mouth 2 (two) times daily. 60 tablet 1    clopidogrel (PLAVIX) 75 mg tablet Take 1 tablet (75 mg total) by mouth once daily. 30 tablet 1    furosemide (LASIX) 40 MG  tablet Take 1 tablet (40 mg total) by mouth once daily. 30 tablet 1    lisinopril (PRINIVIL,ZESTRIL) 2.5 MG tablet Take 1 tablet (2.5 mg total) by mouth once daily. 30 tablet 1    mirtazapine (REMERON) 15 MG tablet Take 15 mg by mouth every evening.        polyethylene glycol (GLYCOLAX) 17 gram PwPk Take 17 g by mouth 2 (two) times daily as needed (constipation). 30 each 0    tamsulosin (FLOMAX) 0.4 mg Cap Take 1 capsule (0.4 mg total) by mouth once daily. 30 capsule 1    tiotropium (SPIRIVA) 18 mcg inhalation capsule Inhale 1 capsule (18 mcg total) into the lungs once daily. Controller 30 capsule 11       PMH:  As per HPI and below:  Past Medical History:   Diagnosis Date    Coronary artery disease     Hyperlipidemia     Hypertension      No past surgical history on file.    Social History     Socioeconomic History    Marital status: Single     Spouse name: Not on file    Number of children: Not on file    Years of education: Not on file    Highest education level: Not on file   Occupational History    Not on file   Social Needs    Financial resource strain: Not on file    Food insecurity:     Worry: Not on file     Inability: Not on file    Transportation needs:     Medical: Not on file     Non-medical: Not on file   Tobacco Use    Smoking status: Current Every Day Smoker     Packs/day: 1.00     Years: 53.00     Pack years: 53.00     Types: Cigarettes    Smokeless tobacco: Never Used   Substance and Sexual Activity    Alcohol use: Not on file    Drug use: Not on file    Sexual activity: Not on file   Lifestyle    Physical activity:     Days per week: Not on file     Minutes per session: Not on file    Stress: Not on file   Relationships    Social connections:     Talks on phone: Not on file     Gets together: Not on file     Attends Taoist service: Not on file     Active member of club or organization: Not on file     Attends meetings of clubs or organizations: Not on file      Relationship status: Not on file   Other Topics Concern    Not on file   Social History Narrative    Not on file       Family History   Problem Relation Age of Onset    No Known Problems Mother     No Known Problems Father        Physical Exam:    There were no vitals filed for this visit.  Appearance: No acute distress.  Skin: No rashes seen.  Good turgor.  No abrasions.  No ecchymoses.  Eyes: No conjunctival injection.  ENT: Oropharynx clear.    Chest: Clear to auscultation bilaterally.  Good air movement.  No wheezes.  No rhonchi.  Cardiovascular: Tachycardic.  No murmurs. No gallops. No rubs.  Abdomen: Soft.  Not distended.  Minimally tender in the infraumbilical region.  No guarding.  No rebound.  Musculoskeletal: Good range of motion all joints.  No deformities.    Neurologic: Motor intact.  Sensation intact.  Mental Status:  Alert and oriented x 3.  Appropriate, conversant.      Initial Impression:  AAA, enlarging    Laboratory Studies:  Labs Reviewed - No data to display    Chart reviewed.    Imaging Results    None         Medications Given:  Medications   lidocaine (PF) 10 mg/ml (1%) injection 10 mg (has no administration in time range)   ceFAZolin injection 2 g (has no administration in time range)   mupirocin 2 % ointment (has no administration in time range)   vancomycin in dextrose 5 % 1 gram/250 mL IVPB 1,000 mg (has no administration in time range)   esmolol 2000 mg in sodium chloride 0.9% 100 mL (20 mg/mL) (has no administration in time range)       Discussed with: Vascular Surgery    MDM:    63 y.o. male with AAA.  To OR.      Diagnostic Impression:    1. AAA (abdominal aortic aneurysm) without rupture               Josias Tang MD  07/07/19 2006

## 2019-07-08 NOTE — SUBJECTIVE & OBJECTIVE
Follow-up For: Procedure(s) (LRB):  REPAIR-ANEURYSM-ABDOMINAL AORTIC-ENDOVASCULAR (AAA) (Bilateral)    Post-Operative Day: 1 Day Post-Op     Past Medical History:   Diagnosis Date    Coronary artery disease     Hyperlipidemia     Hypertension        History reviewed. No pertinent surgical history.    Review of patient's allergies indicates:  No Known Allergies    Family History     Problem Relation (Age of Onset)    No Known Problems Mother, Father        Tobacco Use    Smoking status: Current Every Day Smoker     Packs/day: 1.00     Years: 53.00     Pack years: 53.00     Types: Cigarettes    Smokeless tobacco: Never Used   Substance and Sexual Activity    Alcohol use: Not on file    Drug use: Not on file    Sexual activity: Not on file      Review of Systems   Unable to perform ROS: Intubated     Objective:     Vital Signs (Most Recent):  Temp: 97.6 °F (36.4 °C) (07/07/19 2330)  Pulse: 102 (07/07/19 2010)  Resp: 17 (07/07/19 2010)  BP: 127/81 (07/07/19 2010)  SpO2: 100 % (07/07/19 2318) Vital Signs (24h Range):  Temp:  [97.3 °F (36.3 °C)-97.6 °F (36.4 °C)] 97.6 °F (36.4 °C)  Pulse:  [101-146] 102  Resp:  [17-30] 17  SpO2:  [91 %-100 %] 100 %  BP: (113-157)/() 127/81     Weight: 62.7 kg (138 lb 3.7 oz)  Body mass index is 22.31 kg/m².      Intake/Output Summary (Last 24 hours) at 7/8/2019 0013  Last data filed at 7/7/2019 2300  Gross per 24 hour   Intake --   Output 1200 ml   Net -1200 ml     Physical Exam   Constitutional: He appears well-developed and well-nourished.   HENT:   Head: Normocephalic and atraumatic.   Eyes: Conjunctivae are normal.   Cardiovascular: Normal rate.   Pulses:       Dorsalis pedis pulses are 1+ on the right side, and 1+ on the left side.        Posterior tibial pulses are 1+ on the right side, and 1+ on the left side.   Pulmonary/Chest:   Mechanically ventilated.    Abdominal: Soft. There is no tenderness.   Neurological: He is alert.   Skin: Skin is warm and dry.    Psychiatric: He has a normal mood and affect. His behavior is normal.   Nursing note and vitals reviewed.    Vents:  Vent Mode: A/C (07/07/19 2318)  Ventilator Initiated: Yes (07/07/19 2318)  Set Rate: 16 bmp (07/07/19 2318)  Vt Set: 380 mL (07/07/19 2318)  PEEP/CPAP: 6 cmH20 (07/07/19 2318)  Oxygen Concentration (%): 51 (07/07/19 2330)  Peak Airway Pressure: 16 cmH2O (07/07/19 2318)  Plateau Pressure: 0 cmH20 (07/07/19 2318)  Total Ve: 6.23 mL (07/07/19 2318)    Lines/Drains/Airways     Drain                 Urethral Catheter 07/07/19 1610 Non-latex 16 Fr. less than 1 day          Airway                 Airway - Non-Surgical 07/07/19 2029 Endotracheal Tube less than 1 day          Peripheral Intravenous Line                 Peripheral IV - Single Lumen 07/07/19 1500 18 G Left Forearm less than 1 day         Peripheral IV - Single Lumen 07/1955 18 G Right Antecubital less than 1 day              Significant Labs:    CBC/Anemia Profile:  Recent Labs   Lab 07/07/19  1509 07/07/19 2326   WBC 6.36 5.64  5.64   HGB 12.9* 11.3*  11.3*   HCT 39.3* 35.7*  35.7*   * 95*  95*   MCV 80* 82  82   RDW 16.6* 16.4*  16.4*        Chemistries:  Recent Labs   Lab 07/07/19  1509 07/07/19 2326    139  139   K 4.5 3.9  3.9    103  103   CO2 24 24  24   BUN 28* 26*  26*   CREATININE 1.58* 1.4  1.4   CALCIUM 9.2 8.6*  8.6*   ALBUMIN 3.7 2.6*  2.6*   PROT 7.8 6.1  6.1   BILITOT 0.7 0.6  0.6   ALKPHOS 81 72  72   ALT 30 21  21   AST 35 18  18   MG  --  1.9   PHOS  --  4.2     Significant Imaging: I have reviewed all pertinent imaging results/findings within the past 24 hours.

## 2019-07-08 NOTE — OP NOTE
7/7/2019    Pre-op Diagnosis:  AAA (abdominal aortic aneurysm) without rupture [I71.4]; Symptomatic 6.4 cm AAA, rapid growth from 5.0 cm in April 2019 to symptomatic 6.4cm July 2019     Post-op Diagnosis:  Same     Procedure(s):  1) Emergent percutaneous EVAR Medtronic Endurant II 32 x 16 x 166 mm from R CFA, L iliac extension 16 x 20 x 156mm  2) Extension into R EIA with Medtronic limb 16 x 10 x 156mm  3) Percutaneous femoral access x2; perclose sutures x3 R CFA; x2 L CFA     Surgeon: Hilton Hanson MD FACS     Assistant: PO Loredo MD; ELENA Ng     Anesthesia: General     Findings/Key Components:  Successful treatment of AAA; large type II paired lumbar endoleaks noted and a second lumbar as well     2+ femoral pulses  Monophasic pedal doppler signals (as baseline)     Contrast used: 56 ml  Flouro time: 21.2  Radiation: 584.4 mGy     ASA, statin  Check popliteal u/s; ABIs; carotid u/s post-op  SICU - will remain extubated given acute CHF presentation     EBL: 50 ml    Implants:   Implant Name Type Inv. Item Serial No.  Lot No. LRB No. Used   Endurant II stent graft system   [id number]    1   Endurant II Stent Graft System   [id number]   Left 1   Endurant II Stent graft system    [id number]   Right 1     Complications:  None; patient tolerated the procedure well.           Disposition: PACU.           Condition: stable    Procedure Details:  The patient was seen in the Holding Room. The risks, benefits, complications, treatment options, and expected outcomes were discussed with the patient. The patient concurred with the proposed plan, giving informed consent.  The site of surgery properly noted/marked.   Patient was brought to the Hybrid operating room and positioned supine on the table.  general anesthesia was administered by anesthesia team. Patient's  Abdomen and bilateral groins were prepped and draped in usual sterile fashion using Ioban. The patient received the appropriate preoperative antibiotics. A  Time Out was held and the above information confirmed.     The bilateral femoral arteries were assessed with ultrasound and found to be patent and suitable for endovascular intervention and images were obtained for permanent record. The right common femoral artery was entered under direct ultrasound guidance with a 21-G needle, Mandril wire and 4-Fr  micropuncture sheath. The Mandril wire was then exchanged, under fluoroscopic guidance, for an 0.035-in glide wire which was placed into the distal aorta. The micropuncture dilator was then exchanged over the wire for a 6-[place] sheath. Access into the  right common femoral artery was obtained in similar fashion and Sheathograms were performed which demonstrated access in the bilateral CFA. A small incision was made with a #11 blade scalpel at the right CFA access site adjacent to the sheath; this step was repeated on the left side. Next we placed 2 preclosure devices at 3 o'clock and 9 o'clock in each of the bilateral common femoral arteries over an 0.035 J wire. The patient was systemically  heparinized and the ACT was maintained appropriately at 250. Next, 10F sheaths were placed over the wire bilaterally. An Amplatz wire was placed through the right common femoral artery sheath into the thoracic aorta under fluoroscopic guidance and the distal extent of the wire on the table was marked to help maintain appropriate wire placement. A pigtail catheter was placed through the left CFA sheath over the glide wire under fluoroscopy and positioned at the L1 vertebrae and an angiogram was performed to note the origin of the renal arteries. In accordance with our preoperative CTA the  Right renal artery was occluded and left renal artery was noted to be slightly anterior.  Appropriate angulation was assumed to better visualized the origin of the left renal artery and its position marked for graft deployment. The Right 10F sheath was removed over the Amplatz wire and the main  body of the Endurant device (32 x 16 x 166 mm) was placed over the wire under fluoroscopic guidance and into the aorta at the level of the left renal artery as demonstrated on the angiogram. Parallax was eliminated. The main body of the graft was deployed under image guidance at the marked left renal artery. Precise deployment was confirmed. Suprarenal stent was then released. The contralateral gate was fully deployed with it positioned in the anterior position.The left sided pigtail catheter was exchanged over the wire for a angled glide catheter which was used to access the contralateral gate of the graft. Once access into the gate was appreciated the angled glide catheter was exchanged for a pigtail catheter over the wire and positioning within the main body of the graft was confirmed with 360 degree rotation maneuver of the pigtail within the endograft. The angled glide wire was reintroduced into the pigtail catheter and the pigtail was exchanged over the wire for the glide catheter. The angled glide wire was then exchanged for the amplatz wire which was placed through the endograft and into the thoracic aorta under fluoroscopy. At this time the left CFA 10F sheath was removed over the wire and the contralateral limb (16 x 20 x 156 mm) was placed over the Amplatz wire and into the endograft under image guidance. The limb was positioned with the proximal aspect at the level of the main body graft divider and the distal aspect proximal to the left common iliac artery bifurcation. The limb graft was then deployed under image guidance and noted to be in appropriate position. The left limb device was removed over the wire and exchanged for a 12 [place] sheath. After ensuring appropriate stent placement, the remainder of the proximal main body fixation was device was deployed, followed by completion deployment of the ipsilateral limb. The main body device tip capture was performed and the device was slowly withdrawn  through the graft, under fluoroscopy, and exchanged over the wire for a right CFA 16 [place] sheath. Pigtail marker catheter was placed and right iliac bifurcation was marked. Plan was to extend into the right EIA. Medtronic Endurant limb ([id number]) was deployed with >3cm distal seal zone in healthier portion of EIA.   Then Kissing GWYN balloon angioplasty was performed with [id number] [place] balloon with excellent result.    Next, a Coda balloon was placed over the Amplatz wire, into the main body of the graft, and inflated serialy under fluoroscopic guidance along the graft, beginning at the proximal end of the endograft to the ipsilateral limb. The balloon was then advanced over Lunderquist wire and inflated along the contralateral limb under image guidance. The balloon was exchanged for a pigtail catheter which was placed proximal to the endograft. The wire was removed and an angiogram was performed which demonstrated  Possible type 1A versus type 2 endoleak.  Repeat balloon angioplasty was performed of the proximal neck and main body down to the flow divider was performed.  Repeat angiogram showed more prominent type 2 endoleak from lumbar arteries. The graft appeared in appropriate position and there was no evidence of type 1 or type 3 endoleak. The wires, catheters and sheaths were removed, heparin was reversed with IV protamine and the perclosure devices were deployed bilaterally with appropriate hemostasis.  Of note, 1 additional Perclose device was required to achieve complete hemostasis on the right femoral access.      Patient tolerated the procedure well.  But given patient's overall tenuous clinical status decision was made to leave the patient intubated for controlled extubation in a.m..  Patient was transferred to ICU for postoperative monitoring.      Dr. Hanson was scrubbed and present for entire procedure.    Maik Loredo MD  Vascular/Endovascular Surgery Fellow

## 2019-07-08 NOTE — ANESTHESIA PREPROCEDURE EVALUATION
07/07/2019  Yogesh Lima is a 63 y.o., male.with hx of HTN, HLD, CAD s/p CABG (1993), CHF with known AAA which is now symptommatic.   Transferred from OSH on Esmolol gtt for emergent Endovascular vs. Open Repair.   No evidence of rupture or leak on CTA.     No hx of personal or family rx to anesthesia  NPO status: >24 hours  Social: Everyday smoker <0.5 ppd and drinker    LDA: Left 18G PIV; Right 18G PIV      Patient Active Problem List   Diagnosis    Chronic ischemic heart disease, unspecified    Hx of CABG    Essential hypertension    Mixed hyperlipidemia    History of MI (myocardial infarction)    Peripheral vascular disease    Tobacco abuse    CKD (chronic kidney disease) stage 3, GFR 30-59 ml/min    Acute on chronic combined systolic and diastolic congestive heart failure    Acute on chronic respiratory failure with hypoxia and hypercapnia    Thrombocytopenia    BPH (benign prostatic hyperplasia)    COPD (chronic obstructive pulmonary disease)    CAD (coronary artery disease)    Constipation    Aneurysm of infrarenal abdominal aorta    Benign hypertensive heart and kidney disease with HF and CKD    AAA (abdominal aortic aneurysm) without rupture     No past surgical history on file.    Anesthesia Evaluation    I have reviewed the Patient Summary Reports.    I have reviewed the Nursing Notes.   I have reviewed the Medications.     Review of Systems  Anesthesia Hx:  History of prior surgery of interest to airway management or planning: Denies Family Hx of Anesthesia complications.   Denies Personal Hx of Anesthesia complications.   Cardiovascular:   Hypertension CAD  CABG/stent  CHF Orthopnea PND    Pulmonary:   COPD    Renal/:   Chronic Renal Disease        Physical Exam  General:  Malnutrition, Large Beard Disheveled, poorly groomed, poor hygiene     Airway/Jaw/Neck:  Airway  Findings: Mouth Opening: Normal Tongue: Normal  General Airway Assessment: Adult  Mallampati: II  Improves to I with phonation.  TM Distance: Normal, at least 6 cm  Jaw/Neck Findings:  Neck ROM: Extension Decreased, Mild  Neck Findings: (Large bushy beard)      Dental:  Dental Findings: Periodontal disease, Severe        Mental Status:  Mental Status Findings:  Cooperative, Alert and Oriented         Anesthesia Plan  Type of Anesthesia, risks & benefits discussed:  Anesthesia Type:  general  Patient's Preference: Sedation plan explained with possible need for general discussed. Patient voiced understanding.  Intra-op Monitoring Plan: arterial line, central line and standard ASA monitors  Intra-op Monitoring Plan Comments:   Post Op Pain Control Plan: multimodal analgesia, IV/PO Opioids PRN and per primary service following discharge from PACU  Post Op Pain Control Plan Comments:   Induction:   IV  Beta Blocker:  Patient is not currently on a Beta-Blocker (No further documentation required).       Informed Consent: Patient understands risks and agrees with Anesthesia plan.  Questions answered. Anesthesia consent signed with patient.  ASA Score: 3  emergent   Day of Surgery Review of History & Physical:    H&P update referred to the surgeon.         Ready For Surgery From Anesthesia Perspective.

## 2019-07-08 NOTE — PROGRESS NOTES
Pt up to room [id number]. Pt connected to all monitors and vital signs stable. Pt does follow commands. Propofol and Fentanyl infusing for comfort. Dr. Loredo and Dr. Ng at bedside. Will continue to monitor.

## 2019-07-08 NOTE — H&P
[facility]  Vascular Surgery  History and Physical     Patient Name: Yogesh Lima  MRN: [id number]  Admission Date: 7/7/2019  Code Status: Prior   Attending Physician: Hilton Hanson MD  Primary Care Physician: [facility]    Subjective:     Chief Complaint/Reason for Admission: Symptomatic, enlarging AAA    HPI:  64yo M with medical history significant for HTN, HLD, current smoker, CAD s/p multi-vessel CABG (1993), heart failure, COPD, and known AAA (discovered April 2019)  who presented as transfer from [facility] given symptomatic enlarging AAA. He presented to OSH this evening given abdominal pain of several days, worsening in nature, worst in LLQ and radiating to his back. He also was complaining of bilateral lower extremity edema, shortness of breath and dyspnea on exertion. BNP was 16,200. CT abdomen was performed given abdominal pain and history of AAA and his aneurysm was noted to be enlarged to 6.1cm from previous diameter of 4.8cm. Given this he was transferred to [facility] for emergent vascular surgery intervention.     Medications Prior to Admission   Medication Sig Dispense Refill Last Dose    aspirin (ECOTRIN) 81 MG EC tablet Take 1 tablet (81 mg total) by mouth once daily. 30 tablet 1 Past Week    atorvastatin (LIPITOR) 80 MG tablet Take 1 tablet (80 mg total) by mouth once daily. 30 tablet 1 Past Week    carvedilol (COREG) 3.125 MG tablet Take 1 tablet (3.125 mg total) by mouth 2 (two) times daily. 60 tablet 1 7/7/2019    clopidogrel (PLAVIX) 75 mg tablet Take 1 tablet (75 mg total) by mouth once daily. 30 tablet 1 Past Week    furosemide (LASIX) 40 MG tablet Take 1 tablet (40 mg total) by mouth once daily. 30 tablet 1 Past Week    lisinopril (PRINIVIL,ZESTRIL) 2.5 MG tablet Take 1 tablet (2.5 mg total) by mouth once daily. 30 tablet 1 Past Week    mirtazapine (REMERON) 15 MG tablet Take 15 mg by mouth every evening.     Past Week    polyethylene glycol (GLYCOLAX)  17 gram PwPk Take 17 g by mouth 2 (two) times daily as needed (constipation). 30 each 0 Past Week    tamsulosin (FLOMAX) 0.4 mg Cap Take 1 capsule (0.4 mg total) by mouth once daily. 30 capsule 1 7/7/2019    tiotropium (SPIRIVA) 18 mcg inhalation capsule Inhale 1 capsule (18 mcg total) into the lungs once daily. Controller 30 capsule 11 Past Week       Review of patient's allergies indicates:  No Known Allergies    Past Medical History:   Diagnosis Date    Coronary artery disease     Hyperlipidemia     Hypertension      History reviewed. No pertinent surgical history.  Family History     Problem Relation (Age of Onset)    No Known Problems Mother, Father        Tobacco Use    Smoking status: Current Every Day Smoker     Packs/day: 1.00     Years: 53.00     Pack years: 53.00     Types: Cigarettes    Smokeless tobacco: Never Used   Substance and Sexual Activity    Alcohol use: Not on file    Drug use: Not on file    Sexual activity: Not on file     Review of Systems   Constitutional: Positive for appetite change and fatigue. Negative for activity change, chills and fever.   HENT: Negative for congestion, ear pain, rhinorrhea and sore throat.    Eyes: Negative for pain, discharge and visual disturbance.   Respiratory: Positive for shortness of breath and wheezing. Negative for cough and chest tightness.    Cardiovascular: Positive for leg swelling. Negative for chest pain and palpitations.   Gastrointestinal: Positive for abdominal pain and constipation. Negative for abdominal distention, blood in stool, diarrhea, nausea and vomiting.   Genitourinary: Positive for difficulty urinating.   Musculoskeletal: Positive for back pain. Negative for neck pain.   Skin: Negative for color change and rash.   Neurological: Negative for dizziness, numbness and headaches.   Hematological: Negative for adenopathy.   Psychiatric/Behavioral: Negative.      Objective:     Vital Signs (Most Recent):  Temp: 97.3 °F (36.3 °C)  (07/07/19 1948)  Pulse: 102 (07/07/19 2010)  Resp: 17 (07/07/19 2010)  BP: 127/81 (07/07/19 2010)  SpO2: (!) 94 % (07/07/19 2010) Vital Signs (24h Range):  Temp:  [97.3 °F (36.3 °C)-97.4 °F (36.3 °C)] 97.3 °F (36.3 °C)  Pulse:  [101-146] 102  Resp:  [17-30] 17  SpO2:  [91 %-96 %] 94 %  BP: (113-157)/() 127/81     Weight: 62.7 kg (138 lb 3.7 oz)  Body mass index is 22.31 kg/m².    Physical Exam   Constitutional: He is oriented to person, place, and time. He appears cachectic. He is cooperative. He appears ill. Nasal cannula in place.   Disheveled    HENT:   Head: Normocephalic and atraumatic.   Nose: Nose normal.   Eyes: Pupils are equal, round, and reactive to light. Conjunctivae and EOM are normal. No scleral icterus.   Neck: Normal range of motion. No thyromegaly present.   Cardiovascular: Regular rhythm. Tachycardia present. Exam reveals no gallop and no friction rub.   No murmur heard.  Pulses:       Radial pulses are 2+ on the right side, and 2+ on the left side.        Femoral pulses are 2+ on the right side, and 2+ on the left side.       Dorsalis pedis pulses are 2+ on the right side, and 2+ on the left side.   Pulmonary/Chest:   Nasal cannula in place, appears somewhat short of breath with conversation   Abdominal: Soft. Bowel sounds are normal. He exhibits mass (palpabe, pulsatile abdominal mass). He exhibits no distension. There is no tenderness.   Musculoskeletal: Normal range of motion.   Lymphadenopathy:     He has no cervical adenopathy.   Neurological: He is alert and oriented to person, place, and time.   Skin: Skin is warm and dry. No rash noted.       Significant Labs:  CBC:   Recent Labs   Lab 07/07/19  1509   WBC 6.36   RBC 4.94   HGB 12.9*   HCT 39.3*   *   MCV 80*   MCH 26.1*   MCHC 32.8     CMP:   Recent Labs   Lab 07/07/19  1509   *   CALCIUM 9.2   ALBUMIN 3.7   PROT 7.8      K 4.5   CO2 24      BUN 28*   CREATININE 1.58*   ALKPHOS 81   ALT 30   AST 35    BILITOT 0.7     All pertinent labs from the last 24 hours have been reviewed.    Significant Diagnostics:  I have reviewed all pertinent imaging results/findings within the past 24 hours.    Assessment and Plan:     Aneurysm of infrarenal abdominal aorta  62yo M with multiple medical co-morbidities who presented as transfer from OSH given symptomatic, enlarging AAA    - Patient seen and examined, labs and imaging reviewed, discussed with staff  - Given AAA with significant enlargement since April 2019 and diameter >6cm on CT as well as symptoms of abdominal pain, he requires emergent intervention. We will attempt to proceed with endovascular intervention, but may need to convert to open repair.   - Findings discussed with patient, informed of the severity of his diagnosis as well as the risks and benefits of the procedure, he is willing to proceed, consents signed  - Type & screen sent, blood on hold  - Will need ICU bed following procedure        Carmen Ng MD  Vascular Surgery  [facility]

## 2019-07-08 NOTE — SUBJECTIVE & OBJECTIVE
Medications Prior to Admission   Medication Sig Dispense Refill Last Dose    aspirin (ECOTRIN) 81 MG EC tablet Take 1 tablet (81 mg total) by mouth once daily. 30 tablet 1 Past Week    atorvastatin (LIPITOR) 80 MG tablet Take 1 tablet (80 mg total) by mouth once daily. 30 tablet 1 Past Week    carvedilol (COREG) 3.125 MG tablet Take 1 tablet (3.125 mg total) by mouth 2 (two) times daily. 60 tablet 1 7/7/2019    clopidogrel (PLAVIX) 75 mg tablet Take 1 tablet (75 mg total) by mouth once daily. 30 tablet 1 Past Week    furosemide (LASIX) 40 MG tablet Take 1 tablet (40 mg total) by mouth once daily. 30 tablet 1 Past Week    lisinopril (PRINIVIL,ZESTRIL) 2.5 MG tablet Take 1 tablet (2.5 mg total) by mouth once daily. 30 tablet 1 Past Week    mirtazapine (REMERON) 15 MG tablet Take 15 mg by mouth every evening.     Past Week    polyethylene glycol (GLYCOLAX) 17 gram PwPk Take 17 g by mouth 2 (two) times daily as needed (constipation). 30 each 0 Past Week    tamsulosin (FLOMAX) 0.4 mg Cap Take 1 capsule (0.4 mg total) by mouth once daily. 30 capsule 1 7/7/2019    tiotropium (SPIRIVA) 18 mcg inhalation capsule Inhale 1 capsule (18 mcg total) into the lungs once daily. Controller 30 capsule 11 Past Week       Review of patient's allergies indicates:  No Known Allergies    Past Medical History:   Diagnosis Date    Coronary artery disease     Hyperlipidemia     Hypertension      History reviewed. No pertinent surgical history.  Family History     Problem Relation (Age of Onset)    No Known Problems Mother, Father        Tobacco Use    Smoking status: Current Every Day Smoker     Packs/day: 1.00     Years: 53.00     Pack years: 53.00     Types: Cigarettes    Smokeless tobacco: Never Used   Substance and Sexual Activity    Alcohol use: Not on file    Drug use: Not on file    Sexual activity: Not on file     Review of Systems   Constitutional: Positive for appetite change and fatigue. Negative for activity  change, chills and fever.   HENT: Negative for congestion, ear pain, rhinorrhea and sore throat.    Eyes: Negative for pain, discharge and visual disturbance.   Respiratory: Positive for shortness of breath and wheezing. Negative for cough and chest tightness.    Cardiovascular: Positive for leg swelling. Negative for chest pain and palpitations.   Gastrointestinal: Positive for abdominal pain and constipation. Negative for abdominal distention, blood in stool, diarrhea, nausea and vomiting.   Genitourinary: Positive for difficulty urinating.   Musculoskeletal: Positive for back pain. Negative for neck pain.   Skin: Negative for color change and rash.   Neurological: Negative for dizziness, numbness and headaches.   Hematological: Negative for adenopathy.   Psychiatric/Behavioral: Negative.      Objective:     Vital Signs (Most Recent):  Temp: 97.3 °F (36.3 °C) (07/07/19 1948)  Pulse: 102 (07/07/19 2010)  Resp: 17 (07/07/19 2010)  BP: 127/81 (07/07/19 2010)  SpO2: (!) 94 % (07/07/19 2010) Vital Signs (24h Range):  Temp:  [97.3 °F (36.3 °C)-97.4 °F (36.3 °C)] 97.3 °F (36.3 °C)  Pulse:  [101-146] 102  Resp:  [17-30] 17  SpO2:  [91 %-96 %] 94 %  BP: (113-157)/() 127/81     Weight: 62.7 kg (138 lb 3.7 oz)  Body mass index is 22.31 kg/m².    Physical Exam   Constitutional: He is oriented to person, place, and time. He appears cachectic. He is cooperative. He appears ill. Nasal cannula in place.   Disheveled    HENT:   Head: Normocephalic and atraumatic.   Nose: Nose normal.   Eyes: Pupils are equal, round, and reactive to light. Conjunctivae and EOM are normal. No scleral icterus.   Neck: Normal range of motion. No thyromegaly present.   Cardiovascular: Regular rhythm. Tachycardia present. Exam reveals no gallop and no friction rub.   No murmur heard.  Pulses:       Radial pulses are 2+ on the right side, and 2+ on the left side.        Femoral pulses are 2+ on the right side, and 2+ on the left side.        Dorsalis pedis pulses are 2+ on the right side, and 2+ on the left side.   Pulmonary/Chest:   Nasal cannula in place, appears somewhat short of breath with conversation   Abdominal: Soft. Bowel sounds are normal. He exhibits mass (palpabe, pulsatile abdominal mass). He exhibits no distension. There is no tenderness.   Musculoskeletal: Normal range of motion.   Lymphadenopathy:     He has no cervical adenopathy.   Neurological: He is alert and oriented to person, place, and time.   Skin: Skin is warm and dry. No rash noted.       Significant Labs:  CBC:   Recent Labs   Lab 07/07/19  1509   WBC 6.36   RBC 4.94   HGB 12.9*   HCT 39.3*   *   MCV 80*   MCH 26.1*   MCHC 32.8     CMP:   Recent Labs   Lab 07/07/19  1509   *   CALCIUM 9.2   ALBUMIN 3.7   PROT 7.8      K 4.5   CO2 24      BUN 28*   CREATININE 1.58*   ALKPHOS 81   ALT 30   AST 35   BILITOT 0.7     All pertinent labs from the last 24 hours have been reviewed.    Significant Diagnostics:  I have reviewed all pertinent imaging results/findings within the past 24 hours.

## 2019-07-08 NOTE — SUBJECTIVE & OBJECTIVE
Past Medical History:   Diagnosis Date    Coronary artery disease     Hyperlipidemia     Hypertension        Past Surgical History:   Procedure Laterality Date    REPAIR-ANEURYSM-ABDOMINAL AORTIC-ENDOVASCULAR (AAA) Bilateral 7/7/2019    Performed by Hilton Hanson MD at [facility] OR [address] [place] [address]       Review of patient's allergies indicates:  No Known Allergies    Current Facility-Administered Medications on File Prior to Encounter   Medication    [COMPLETED] iohexol (OMNIPAQUE 350) injection 75 mL    [DISCONTINUED] esmolol 2000 mg in sodium chloride 0.9% 100 mL (20 mg/mL)    [DISCONTINUED] esmolol injection     Current Outpatient Medications on File Prior to Encounter   Medication Sig    aspirin (ECOTRIN) 81 MG EC tablet Take 1 tablet (81 mg total) by mouth once daily.    atorvastatin (LIPITOR) 80 MG tablet Take 1 tablet (80 mg total) by mouth once daily.    carvedilol (COREG) 3.125 MG tablet Take 1 tablet (3.125 mg total) by mouth 2 (two) times daily.    clopidogrel (PLAVIX) 75 mg tablet Take 1 tablet (75 mg total) by mouth once daily.    furosemide (LASIX) 40 MG tablet Take 1 tablet (40 mg total) by mouth once daily.    lisinopril (PRINIVIL,ZESTRIL) 2.5 MG tablet Take 1 tablet (2.5 mg total) by mouth once daily.    mirtazapine (REMERON) 15 MG tablet Take 15 mg by mouth every evening.      polyethylene glycol (GLYCOLAX) 17 gram PwPk Take 17 g by mouth 2 (two) times daily as needed (constipation).    tamsulosin (FLOMAX) 0.4 mg Cap Take 1 capsule (0.4 mg total) by mouth once daily.    tiotropium (SPIRIVA) 18 mcg inhalation capsule Inhale 1 capsule (18 mcg total) into the lungs once daily. Controller     Family History     Problem Relation (Age of Onset)    No Known Problems Mother, Father        Tobacco Use    Smoking status: Current Every Day Smoker     Packs/day: 1.00     Years: 53.00     Pack years: 53.00     Types: Cigarettes    Smokeless tobacco: Never Used   Substance and Sexual Activity    Alcohol  use: Not on file    Drug use: Not on file    Sexual activity: Not on file     Review of Systems   Unable to perform ROS: intubated     Objective:     Vital Signs (Most Recent):  Temp: (!) 101.4 °F (38.6 °C) (07/08/19 1630)  Pulse: 97 (07/08/19 1715)  Resp: 17 (07/08/19 1715)  BP: (!) 115/57 (07/08/19 1700)  SpO2: 98 % (07/08/19 1715) Vital Signs (24h Range):  Temp:  [97.3 °F (36.3 °C)-101.4 °F (38.6 °C)] 101.4 °F (38.6 °C)  Pulse:  [] 97  Resp:  [10-30] 17  SpO2:  [93 %-100 %] 98 %  BP: ()/(47-92) 115/57  Arterial Line BP: ()/(41-75) 122/66     Weight: 62.6 kg (138 lb)  Body mass index is 21.61 kg/m².    SpO2: 98 %  O2 Device (Oxygen Therapy): ventilator      Intake/Output Summary (Last 24 hours) at 7/8/2019 1813  Last data filed at 7/8/2019 1800  Gross per 24 hour   Intake 2657 ml   Output 4409 ml   Net -1752 ml       Lines/Drains/Airways     Drain                 Urethral Catheter 07/07/19 1610 Non-latex 16 Fr. 1 day         NG/OG Tube 07/08/19 0126 Center mouth less than 1 day          Airway                 Airway - Non-Surgical 07/07/19 2029 Endotracheal Tube less than 1 day          Arterial Line                 Arterial Line 07/08/19 0125 Left Radial less than 1 day          Peripheral Intravenous Line                 Peripheral IV - Single Lumen 07/07/19 1500 18 G Left Forearm 1 day         Peripheral IV - Single Lumen 07/1955 18 G Right Antecubital less than 1 day         Peripheral IV - Single Lumen 07/08/19 0029 20 G Right Forearm less than 1 day                Physical Exam   Constitutional:   dissheveled   HENT:   Head: Normocephalic and atraumatic.   Neck: No JVD present.   JVD at ~8cm   Cardiovascular: Normal rate and regular rhythm. Exam reveals no gallop and no friction rub.   No murmur heard.  Pulmonary/Chest: No stridor.   Abdominal: Soft. Bowel sounds are normal. He exhibits no distension and no mass. There is no tenderness. There is no rebound and no guarding.    Lymphadenopathy:     He has no cervical adenopathy.   Skin: Skin is warm and dry.       Significant Labs:   BMP:   Recent Labs   Lab 07/07/19  2326 07/08/19  0400 07/08/19  1143   GLU 91  91 81 69*     139 141 141   K 3.9  3.9 4.0 3.2*     103 104 102   CO2 24  24 26 29   BUN 26*  26* 24* 22   CREATININE 1.4  1.4 1.3 1.4   CALCIUM 8.6*  8.6* 8.2* 8.7   MG 1.9 2.3 2.0   , CBC   Recent Labs   Lab 07/07/19  1509 07/07/19  2326 07/08/19  0400   WBC 6.36 5.64  5.64 5.73   HGB 12.9* 11.3*  11.3* 10.5*   HCT 39.3* 35.7*  35.7* 33.1*   * 95*  95* 90*   , Lipid Panel No results for input(s): CHOL, HDL, LDLCALC, TRIG, CHOLHDL in the last 48 hours. and Troponin   Recent Labs   Lab 07/07/19  1509 07/08/19  1143   TROPONINI 0.022 0.093*       Significant Imaging: Echocardiogram:   Transthoracic echo (TTE) complete (Cupid Only):   Results for orders placed or performed during the hospital encounter of 07/07/19   Transthoracic echo (TTE) 2D with Color Flow   Result Value Ref Range    Ascending aorta 3.07 cm    STJ 3.14 cm    AV mean gradient 2 mmHg    Ao peak aaliyah 0.90 m/s    Ao VTI 12.22 cm    IVS 1.01 0.6 - 1.1 cm    LA size 4.50 cm    Left Atrium Major Axis 5.88 cm    Left Atrium Minor Axis 5.21 cm    LVIDD 5.76 3.5 - 6.0 cm    LVIDS 4.88 (A) 2.1 - 4.0 cm    LVOT diameter 2.22 cm    LVOT peak VTI 10.53 cm    PW 0.62 0.6 - 1.1 cm    RA Major Axis 5.72 cm    RA Width 3.28 cm    RVDD 2.62 cm    Sinus 3.18 cm    TAPSE 1.33 cm    TDI LATERAL 0.17 m/s    TDI SEPTAL 0.09 m/s    LA WIDTH 4.05 cm    LV Diastolic Volume 164.01 mL    LV Systolic Volume 111.91 mL    LVOT peak aaliyah 0.74 m/s    FS 15 %    LA volume 85.59 cm3    LV mass 177.38 g    Left Ventricle Relative Wall Thickness 0.22 cm    AV valve area 3.33 cm2    AV Velocity Ratio 0.82     AV index (prosthetic) 0.86     Mean e' 0.13 m/s    LVOT area 3.9 cm2    LVOT stroke volume 40.74 cm3    AV peak gradient 3 mmHg    LV Systolic Volume Index 64.8  mL/m2    LV Diastolic Volume Index 94.97 mL/m2    LA Volume Index 49.6 mL/m2    LV Mass Index 103 g/m2    BSA 1.72 m2    Narrative    · Severely decreased left ventricular systolic function with evidence of   basal inferior akinesis. The estimated ejection fraction is 25%.  · Mildly to moderately reduced right ventricular systolic function.  · Left ventricular diastolic dysfunction.  · Severe left atrial enlargement.  · Mild mitral regurgitation.  · Mechanically ventilated; cannot use inferior caval vein diameter to   estimate central venous pressure.       and EKG: NSR w PVCs, new TWI in anterolateral leads

## 2019-07-08 NOTE — HPI
63-year-old male with history of HTN, HLD, current smoker, CAD s/p multi-vessel CABG (1993), heart failure, COPD, and known AAA (discovered April 2019)  who presented as transfer from OSH given symptomatic enlarging AAA. He presented to OSH this evening given abdominal pain of several days, worsening in nature, worst in LLQ and radiating to his back. He also was complaining of bilateral lower extremity edema, shortness of breath and dyspnea on exertion. BNP was 16,200. CT abdomen was performed given abdominal pain and history of AAA and his aneurysm was noted to be enlarged to 6.1cm from previous diameter of 4.8cm. Given this he was transferred to [facility] for emergent vascular surgery intervention. He's now s/p EVAR and remains intubated off pressors.

## 2019-07-08 NOTE — CONSULTS
See H&P 7/7/2019    Sara Ng MD  PGY-3 General Surgery   [phone]    Vascular Attending  Agree with above  Emergent EVAR will be attempted for acutely symptomatic 6.4cm AAA in this 62 yo man with h/o heavy active tobacco use with acute CHF  2+ femorals but monophasic pedal doppler signals  No family has been able to be contacted    Hilton Hanson MD FACS  Vascular/Endovascular Surgery

## 2019-07-09 NOTE — SUBJECTIVE & OBJECTIVE
Interval History/Significant Events: Febrile overnight, tmax 101.5. Blood and resp cultures: NGTD. Went into SVT 7/8. EKG showed ST abnormality and t wave inversions. Started on heparin per ACS protocol. ECHO on 7/8 showed EF 25%. Plan today: SBT and possible extubate.     Follow-up For: Procedure(s) (LRB):  REPAIR-ANEURYSM-ABDOMINAL AORTIC-ENDOVASCULAR (AAA) (Bilateral)    Post-Operative Day: 2 Days Post-Op    Objective:     Vital Signs (Most Recent):  Temp: 98.7 °F (37.1 °C) (07/09/19 0700)  Pulse: 84 (07/09/19 0715)  Resp: 17 (07/09/19 0715)  BP: 126/66 (07/09/19 0700)  SpO2: 100 % (07/09/19 0715) Vital Signs (24h Range):  Temp:  [98.1 °F (36.7 °C)-101.5 °F (38.6 °C)] 98.7 °F (37.1 °C)  Pulse:  [] 84  Resp:  [1-27] 17  SpO2:  [93 %-100 %] 100 %  BP: ()/(47-76) 126/66  Arterial Line BP: ()/() 137/64     Weight: 63 kg (138 lb 14.2 oz)  Body mass index is 21.75 kg/m².    Intake/Output Summary (Last 24 hours) at 7/9/2019 0727  Last data filed at 7/9/2019 0700  Gross per 24 hour   Intake 2622.2 ml   Output 3639 ml   Net -1016.8 ml       Physical Exam   Constitutional: He appears well-developed and well-nourished.   HENT:   Head: Normocephalic and atraumatic.   Intubated and sedated   Cardiovascular: Normal rate and regular rhythm.   Pulmonary/Chest:   Mechanical ventilation   Abdominal: Soft. Bowel sounds are normal.   Genitourinary:   Genitourinary Comments: Mari cath in place   Neurological:   Intubated and sedated   Skin: Skin is warm and dry.       Vents:  Vent Mode: A/C (07/09/19 0514)  Ventilator Initiated: Yes (07/07/19 5548)  Set Rate: 16 bmp (07/09/19 0514)  Vt Set: 380 mL (07/09/19 0514)  PEEP/CPAP: 5 cmH20 (07/09/19 0514)  Oxygen Concentration (%): 40 (07/09/19 0715)  Peak Airway Pressure: 13 cmH2O (07/09/19 0514)  Plateau Pressure: 12 cmH20 (07/09/19 0514)  Total Ve: 12.5 mL (07/09/19 0514)  F/VT Ratio<105 (RSBI): (!) 34.76 (07/09/19 0514)    Lines/Drains/Airways     Drain                  NG/OG Tube 07/08/19 0126 Center mouth 1 day         Urethral Catheter 07/07/19 1610 Non-latex 16 Fr. 1 day          Airway                 Airway - Non-Surgical 07/07/19 2029 Endotracheal Tube 1 day          Arterial Line                 Arterial Line 07/08/19 0125 Left Radial 1 day          Peripheral Intravenous Line                 Peripheral IV - Single Lumen 07/07/19 1500 18 G Left Forearm 1 day         Peripheral IV - Single Lumen 07/1955 18 G Right Antecubital 1 day         Peripheral IV - Single Lumen 07/08/19 0029 20 G Right Forearm 1 day                Significant Labs:    CBC/Anemia Profile:  Recent Labs   Lab 07/08/19  1721 07/08/19  1821 07/09/19  0251   WBC 15.85* 16.43* 16.14*   HGB 11.4* 11.3* 12.0*   HCT 36.9* 35.0* 37.0*   PLT 88* 93* 92*   MCV 83 79* 79*   RDW 16.4* 16.4* 16.5*        Chemistries:  Recent Labs   Lab 07/08/19  0400  07/08/19  1721 07/08/19  2214 07/09/19  0100 07/09/19  0355      < > 138  --  138 137   K 4.0   < > 4.7 4.7 4.5 4.3      < > 102  --  100 99   CO2 26   < > 26  --  27 26   BUN 24*   < > 22  --  21 21   CREATININE 1.3   < > 1.5*  --  1.4 1.4   CALCIUM 8.2*   < > 8.4*  --  8.7 8.6*   ALBUMIN 2.4*  --  2.5*  --   --  2.4*   PROT 5.5*  --  6.1  --   --  6.4   BILITOT 0.3  --  0.6  --   --  0.9   ALKPHOS 65  --  70  --   --  68   ALT 17  --  11  --   --  7*   AST 13  --  18  --   --  23   MG 2.3   < > 2.0  --  2.0 1.9   PHOS 4.0   < > 4.2  --  4.6* 4.4    < > = values in this interval not displayed.       ABGs:   Recent Labs   Lab 07/09/19  0327   PH 7.485*   PCO2 33.4*   HCO3 25.1   POCSATURATED 100   BE 2     Blood Culture:   Recent Labs   Lab 07/08/19  1715 07/08/19  1730   LABBLOO No Growth to date No Growth to date     Respiratory Culture:   Recent Labs   Lab 07/08/19  1733   GSRESP <10 epithelial cells per low power field.  Many WBC's  No organisms seen       Significant Imaging:  I have reviewed all pertinent imaging results/findings within  the past 24 hours.

## 2019-07-09 NOTE — ASSESSMENT & PLAN NOTE
62yo M with multiple medical co-morbidities who presented as transfer from OSH given symptomatic, enlarging AAA now s/p emergent EVAR on 7/7    Neuro: Wean sedation this AM for extubation, prn pain control  Resp: Plan for extubation later this morning following SBT,duonebs q4 in setting of known COPD, daily CXR while intubated, ABGs prn, continuous pulse ox  CV: TTE yesterday with evidence of CHF, EF 25%, BNP significantly elevated, hypotensive during the day improved with dobutamine, wean as tolerated. Cards consulted, appreciate recs. Continue daily ASA, statin; SBP < 140, HR < 100; Troponin 0.09 yesterday, hep gtt started per ACS protocol, trop 0.05 this AM, likely elevated 2/2 to demand ischemia, can likely d/c hep gtt  FEN/GI: NPO for now in anticipation of extubation later today, if were to remain intubated ok for TFs, ok to advance diet per SICU team following extubation, replete lytes prn  Renal: 40 lasix, BID, monitor response, monitor BUN/Cr, maintain arreola for accurate I/Os for now - has previously had issues with retention  Heme/ID: H/H stable, WBC 16 today, no bandemia, febrile to 101.5 overnight, cultured - follow up  Endo: POCT blood glucose, no current issues, continue to monitor  Msk: No issues, continue to monitor    Ppx: SQH  Dispo: Continue ICU care, wean to extubate, will need PT/OT

## 2019-07-09 NOTE — PLAN OF CARE
Extended Emergency Contact Information  Primary Emergency Contact: Jessica Emerson  Address: [address] [place] [address] [place]  Home Phone: [phone]  Relation: Relative    [facility]  [address] / [place] [address]    No future appointments.    Payor: MEDICAID / Plan: [employer] [facility] ([facility]) / Product Type: Managed Medicaid /       [facility] - [place], [place] - [address]  [address]  [place] [address]  Phone: [phone] Fax: [phone]       07/08/19 2120   Discharge Assessment   Assessment Type Discharge Planning Assessment   Confirmed/corrected address and phone number on facesheet? No   Assessment information obtained from? Medical Record   Expected Length of Stay (days) 7   Communicated expected length of stay with patient/caregiver no   Prior to hospitilization cognitive status: Unable to Assess   Prior to hospitalization functional status: Independent   Current cognitive status: Coma/Sedated/Intubated   Current Functional Status: Completely Dependent   Is patient able to care for self after discharge? Unable to determine at this time (comments)   Readmission Within the Last 30 Days no previous admission in last 30 days   Patient currently being followed by outpatient case management? No   Patient currently receives any other outside agency services? No   Equipment Currently Used at Home none   Do you have any problems affording any of your prescribed medications? TBD   Does the patient receive services at the [facility]mad[facility] Clinic? No   Discharge Plan A Home   Patient/Family in Agreement with Plan unable to assess

## 2019-07-09 NOTE — HOSPITAL COURSE
63-year-old male with history of HTN, HLD, current smoker, CAD s/p multi-vessel CABG (1993), heart failure, COPD, and known AAA (discovered April 2019)  who presented as transfer from OSH given symptomatic enlarging AAA. He presented to OSH this evening given abdominal pain of several days, worsening in nature, worst in LLQ and radiating to his back. He also was complaining of bilateral lower extremity edema, shortness of breath and dyspnea on exertion. BNP was 16,200. CT abdomen was performed given abdominal pain and history of AAA and his aneurysm was noted to be enlarged to 6.1cm from previous diameter of 4.8cm. Given this he was [facility] for emergent vascular surgery intervention. He's now s/p EVAR and remains intubated off pressors.

## 2019-07-09 NOTE — PROGRESS NOTES
[facility]  Vascular Surgery  Progress Note    Patient Name: Yogesh Lima  MRN: [id number]  Admission Date: 7/7/2019  Primary Care Provider: [facility]    Subjective:     Interval History: Overnight febrile to 101.5, pan-cultured. Troponin elevated to 0.09 yesterday, downtrending this AM @ 0.05. Remains intubated and sedated with precedex, some trouble with agitation overnight.     Post-Op Info:  Procedure(s) (LRB):  REPAIR-ANEURYSM-ABDOMINAL AORTIC-ENDOVASCULAR (AAA) (Bilateral)   2 Days Post-Op       Medications:  Continuous Infusions:   esmolol Stopped (07/07/19 2033)    fentanyl 150 mcg/hr (07/08/19 0800)    lactated ringers 50 mL/hr at 07/08/19 0800    propofol 50 mcg/kg/min (07/08/19 0800)     Scheduled Meds:   albuterol-ipratropium  3 mL Nebulization Q4H    aspirin  325 mg Oral Daily    atorvastatin  80 mg Oral Daily    carvedilol  3.125 mg Oral BID    ceFAZolin (ANCEF) IVPB  2 g Intravenous Q8H    famotidine  20 mg Oral BID    heparin (porcine)  5,000 Units Subcutaneous Q8H    mupirocin  1 g Nasal BID    tamsulosin  0.4 mg Oral Daily     PRN Meds:HYDROcodone-acetaminophen     Objective:     Vital Signs (Most Recent):  Temp: 98.3 °F (36.8 °C) (07/08/19 0700)  Pulse: (!) 114 (07/08/19 0800)  Resp: 15 (07/08/19 0800)  BP: 110/76 (07/08/19 0730)  SpO2: 95 % (07/08/19 0800) Vital Signs (24h Range):  Temp:  [97.3 °F (36.3 °C)-98.3 °F (36.8 °C)] 98.3 °F (36.8 °C)  Pulse:  [] 114  Resp:  [10-30] 15  SpO2:  [91 %-100 %] 95 %  BP: (110-157)/() 110/76  Arterial Line BP: (106-135)/(53-75) 135/63     Date 07/08/19 0700 - 07/09/19 0659   Shift 6716-7514 7862-8307 6309-9704 24 Hour Total   INTAKE   Shift Total(mL/kg)       OUTPUT   Urine(mL/kg/hr) 950   950   Drains 150   150   Shift Total(mL/kg) 1100(17.5)   1100(17.5)   Weight (kg) 62.7 62.7 62.7 62.7       Physical Exam   Constitutional: He appears cachectic.   Disheveled    HENT:   Head: Normocephalic  and atraumatic.   Nose: Nose normal.   Eyes: Pupils are equal, round, and reactive to light. Conjunctivae and EOM are normal. No scleral icterus.   Neck: Normal range of motion. No thyromegaly present.   Cardiovascular: Normal rate and regular rhythm. Exam reveals no gallop and no friction rub.   No murmur heard.  Pulses:       Radial pulses are 2+ on the right side, and 2+ on the left side.        Femoral pulses are 2+ on the right side, and 2+ on the left side.  PTs dopplerable in bilateral legs  DP with weak monophasic signal in right leg, non-dopplerable in left   Pulmonary/Chest:   Vent Mode: A/C  Oxygen Concentration (%):  (39-97) 40  Resp Rate Total:  (16 br/min-26 br/min) 18 br/min  Vt Set:  (380 mL) 380 mL  PEEP/CPAP:  (5 cmH20) 5 cmH20  Mean Airway Pressure:  (6.8 cmH20-8.8 cmH20) 8.8 cmH20   Abdominal: Soft. He exhibits no distension. Mass:    There is no tenderness.   Genitourinary:   Genitourinary Comments: Bilateral groin sites c/d/i; no evidence of hematoma    Musculoskeletal: Normal range of motion.   Lymphadenopathy:     He has no cervical adenopathy.   Neurological:   sedated   Skin: Skin is warm and dry. No rash noted.       Significant Labs:  CBC:   Recent Labs   Lab 07/09/19  0251   WBC 16.14*   RBC 4.67   HGB 12.0*   HCT 37.0*   PLT 92*   MCV 79*   MCH 25.7*   MCHC 32.4     CMP:   Recent Labs   Lab 07/09/19  0355   GLU 84   CALCIUM 8.6*   ALBUMIN 2.4*   PROT 6.4      K 4.3   CO2 26   CL 99   BUN 21   CREATININE 1.4   ALKPHOS 68   ALT 7*   AST 23   BILITOT 0.9       Significant Diagnostics:  I have reviewed all pertinent imaging results/findings within the past 24 hours.    Assessment/Plan:     NSTEMI (non-ST elevated myocardial infarction)  Unclear etiology, possible 2/2 to demand given hypotension, though ischemia itself may be driving his hypotension. EKG changes (anterolateral TWIs), trop elevation noted.    - s/p , ordered for ASA 81 qd  - continue high intenity statin  -  heparin gtt ordered, will continue at least 48 hrs (discussed with surgery)  - holding on BB for now given hypotension, will consider in AM  - Will consider for LHC when more stable. May also have to consider in setting of worsening hemodynamic instability of workup suggestive of cardiogenic shock    Aneurysm of infrarenal abdominal aorta  64yo M with multiple medical co-morbidities who presented as transfer from OSH given symptomatic, enlarging AAA now s/p emergent EVAR on 7/7    Neuro: Wean sedation this AM for extubation, prn pain control  Resp: Plan for extubation later this morning following SBT,duonebs q4 in setting of known COPD, daily CXR while intubated, ABGs prn, continuous pulse ox  CV: TTE yesterday with evidence of CHF, EF 25%, BNP significantly elevated, hypotensive during the day improved with dobutamine, wean as tolerated. Cards consulted, appreciate recs. Continue daily ASA, statin; SBP < 140, HR < 100; Troponin 0.09 yesterday, hep gtt started per ACS protocol, trop 0.05 this AM, likely elevated 2/2 to demand ischemia, can likely d/c hep gtt  FEN/GI: NPO for now in anticipation of extubation later today, if were to remain intubated ok for TFs, ok to advance diet per SICU team following extubation, replete lytes prn  Renal: 40 lasix, BID, monitor response, monitor BUN/Cr, maintain arreola for accurate I/Os for now - has previously had issues with retention  Heme/ID: H/H stable, WBC 16 today, no bandemia, febrile to 101.5 overnight, cultured - follow up  Endo: POCT blood glucose, no current issues, continue to monitor  Msk: No issues, continue to monitor    Ppx: SQH  Dispo: Continue ICU care, wean to extubate, will need PT/OT          Carmen Ng MD  Vascular Surgery  [facility]

## 2019-07-09 NOTE — PLAN OF CARE
"Problem: Adult Inpatient Plan of Care  Goal: Plan of Care Review  Outcome: Ongoing (interventions implemented as appropriate)  Dx: AAA (abdominal aortic aneurysm) without rupture    Shift Events: Pt. T-max of 101.5 - tylenol suppository given. Temp now 98.1. Pt. Moves all extremities and follows commands. Pulses dopplered. Pt. Has 100cc of brown/green liquid from OGT. See flowsheets for UO & Flowtrack numbers.     Neuro: Sedated, Arouses to Voice, Follows Commands and Moves All Extremities    Vital Signs: /66 (BP Location: Left arm, Patient Position: Lying)   Pulse 84   Temp 98.7 °F (37.1 °C) (Oral)   Resp 17   Ht 5' 7" (1.702 m)   Wt 63 kg (138 lb 14.2 oz)   SpO2 100%   BMI 21.75 kg/m²     Diet: NPO    Gtts: Precedex, Dobutamine and Heparin    Urine Output: Urinary Catheter see flowsheets cc/    Drains:     Labs/Accuchecks: accuchecks Q4H. Labs discussed with MD.         "

## 2019-07-09 NOTE — PROGRESS NOTES
[facility]  Cardiology  Progress Note    Patient Name: Yogesh Lima  MRN: [id number]  Admission Date: 7/7/2019  Hospital Length of Stay: 2 days  Code Status: Prior   Attending Physician: Hilton Hanson MD   Primary Care Physician: [facility]  Expected Discharge Date: 7/12/2019  Principal Problem:AAA (abdominal aortic aneurysm) without rupture    Subjective:     Hospital Course:   No notes on file    Interval History: Patient extubated, endorsing some groin pain at site of EVAR groin puncture, no bleeding events. Denies chest pain, shortness of breath, dyspnea on exertion.      Review of Systems   Constitution: Negative. Negative for chills and fever.   HENT: Negative.    Eyes: Negative.    Cardiovascular: Negative for chest pain, claudication and paroxysmal nocturnal dyspnea.        Groin pain at EVAR site   Respiratory: Negative for cough, shortness of breath and wheezing.    Endocrine: Negative.    Hematologic/Lymphatic: Does not bruise/bleed easily.   Skin: Negative for nail changes and rash.   Musculoskeletal: Negative.  Negative for back pain.   Gastrointestinal: Negative for abdominal pain, melena, nausea and vomiting.   Neurological: Negative for dizziness and headaches.   Psychiatric/Behavioral: Negative for altered mental status, depression and substance abuse.   Allergic/Immunologic: Negative.      Objective:     Vital Signs (Most Recent):  Temp: 97.7 °F (36.5 °C) (07/09/19 1500)  Pulse: 103 (07/09/19 1715)  Resp: 17 (07/09/19 1715)  BP: (!) 106/59 (07/09/19 1700)  SpO2: 98 % (07/09/19 1715) Vital Signs (24h Range):  Temp:  [97.7 °F (36.5 °C)-101.5 °F (38.6 °C)] 97.7 °F (36.5 °C)  Pulse:  [] 103  Resp:  [1-32] 17  SpO2:  [81 %-100 %] 98 %  BP: ()/(50-88) 106/59  Arterial Line BP: ()/() 108/44     Weight: 63 kg (138 lb 14.2 oz)  Body mass index is 21.75 kg/m².     SpO2: 98 %  O2 Device (Oxygen Therapy): room air      Intake/Output Summary  (Last 24 hours) at 7/9/2019 1728  Last data filed at 7/9/2019 1724  Gross per 24 hour   Intake 2683.2 ml   Output 3232 ml   Net -548.8 ml       Lines/Drains/Airways     Drain                 Urethral Catheter 07/07/19 1610 Non-latex 16 Fr. 2 days         NG/OG Tube 07/08/19 0126 Center mouth 1 day          Arterial Line                 Arterial Line 07/08/19 0125 Left Radial 1 day          Peripheral Intravenous Line                 Peripheral IV - Single Lumen 07/07/19 1500 18 G Left Forearm 2 days         Peripheral IV - Single Lumen 07/1955 18 G Right Antecubital 1 day         Peripheral IV - Single Lumen 07/08/19 0029 20 G Right Forearm 1 day                Physical Exam   Constitutional: He is oriented to person, place, and time. He appears well-developed and well-nourished.   HENT:   Head: Normocephalic and atraumatic.   Eyes: Pupils are equal, round, and reactive to light. Conjunctivae and EOM are normal.   Neck: No JVD present.   Cardiovascular: Normal rate, regular rhythm, normal heart sounds and intact distal pulses. Exam reveals no gallop and no friction rub.   No murmur heard.  2/6 apical systolic murmur    Pulmonary/Chest: Effort normal. No stridor. He has no wheezes. He has no rales. He exhibits no tenderness.   Abdominal: Soft. Bowel sounds are normal. He exhibits no distension and no mass. There is no tenderness. There is no guarding.   Musculoskeletal: He exhibits no edema, tenderness or deformity.   Neurological: He is alert and oriented to person, place, and time.   Skin: Skin is warm and dry. No rash noted. No erythema.   Psychiatric: He has a normal mood and affect.       Significant Labs:  Lab Results   Component Value Date    WBC 17.49 (H) 07/09/2019    HGB 12.1 (L) 07/09/2019    HCT 36.9 (L) 07/09/2019    MCV 80 (L) 07/09/2019     (L) 07/09/2019       CMP  Sodium   Date Value Ref Range Status   07/09/2019 135 (L) 136 - 145 mmol/L Final     Potassium   Date Value Ref Range Status    07/09/2019 4.0 3.5 - 5.1 mmol/L Final     Chloride   Date Value Ref Range Status   07/09/2019 98 95 - 110 mmol/L Final     CO2   Date Value Ref Range Status   07/09/2019 27 23 - 29 mmol/L Final     Glucose   Date Value Ref Range Status   07/09/2019 92 70 - 110 mg/dL Final     BUN, Bld   Date Value Ref Range Status   07/09/2019 23 8 - 23 mg/dL Final     Creatinine   Date Value Ref Range Status   07/09/2019 1.4 0.5 - 1.4 mg/dL Final     Calcium   Date Value Ref Range Status   07/09/2019 9.3 8.7 - 10.5 mg/dL Final     Total Protein   Date Value Ref Range Status   07/09/2019 6.3 6.0 - 8.4 g/dL Final     Albumin   Date Value Ref Range Status   07/09/2019 2.2 (L) 3.5 - 5.2 g/dL Final     Total Bilirubin   Date Value Ref Range Status   07/09/2019 0.9 0.1 - 1.0 mg/dL Final     Comment:     For infants and newborns, interpretation of results should be based  on gestational age, weight and in agreement with clinical  observations.  Premature Infant recommended reference ranges:  Up to 24 hours.............<8.0 mg/dL  Up to 48 hours............<12.0 mg/dL  3-5 days..................<15.0 mg/dL  6-29 days.................<15.0 mg/dL       Alkaline Phosphatase   Date Value Ref Range Status   07/09/2019 62 55 - 135 U/L Final     AST   Date Value Ref Range Status   07/09/2019 15 10 - 40 U/L Final     ALT   Date Value Ref Range Status   07/09/2019 5 (L) 10 - 44 U/L Final     Anion Gap   Date Value Ref Range Status   07/09/2019 10 8 - 16 mmol/L Final     eGFR if    Date Value Ref Range Status   07/09/2019 >60.0 >60 mL/min/1.73 m^2 Final     eGFR if non    Date Value Ref Range Status   07/09/2019 53.1 (A) >60 mL/min/1.73 m^2 Final     Comment:     Calculation used to obtain the estimated glomerular filtration  rate (eGFR) is the CKD-EPI equation.            Significant Imaging: Echocardiogram:   Transthoracic echo (TTE) complete (Cupid Only):   Results for orders placed or performed during the  hospital encounter of 07/07/19   Transthoracic echo (TTE) 2D with Color Flow   Result Value Ref Range    Ascending aorta 3.07 cm    STJ 3.14 cm    AV mean gradient 2 mmHg    Ao peak aaliyah 0.90 m/s    Ao VTI 12.22 cm    IVS 1.01 0.6 - 1.1 cm    LA size 4.50 cm    Left Atrium Major Axis 5.88 cm    Left Atrium Minor Axis 5.21 cm    LVIDD 5.76 3.5 - 6.0 cm    LVIDS 4.88 (A) 2.1 - 4.0 cm    LVOT diameter 2.22 cm    LVOT peak VTI 10.53 cm    PW 0.62 0.6 - 1.1 cm    RA Major Axis 5.72 cm    RA Width 3.28 cm    RVDD 2.62 cm    Sinus 3.18 cm    TAPSE 1.33 cm    TDI LATERAL 0.17 m/s    TDI SEPTAL 0.09 m/s    LA WIDTH 4.05 cm    LV Diastolic Volume 164.01 mL    LV Systolic Volume 111.91 mL    LVOT peak aaliyah 0.74 m/s    FS 15 %    LA volume 85.59 cm3    LV mass 177.38 g    Left Ventricle Relative Wall Thickness 0.22 cm    AV valve area 3.33 cm2    AV Velocity Ratio 0.82     AV index (prosthetic) 0.86     Mean e' 0.13 m/s    LVOT area 3.9 cm2    LVOT stroke volume 40.74 cm3    AV peak gradient 3 mmHg    LV Systolic Volume Index 64.8 mL/m2    LV Diastolic Volume Index 94.97 mL/m2    LA Volume Index 49.6 mL/m2    LV Mass Index 103 g/m2    BSA 1.72 m2    Narrative    · Severely decreased left ventricular systolic function with evidence of   basal inferior akinesis. The estimated ejection fraction is 25%.  · Mildly to moderately reduced right ventricular systolic function.  · Left ventricular diastolic dysfunction.  · Severe left atrial enlargement.  · Mild mitral regurgitation.  · Mechanically ventilated; cannot use inferior caval vein diameter to   estimate central venous pressure.        Assessment and Plan:     62 y/o M with history of CAD s/p CABG 1993, HFrEF (presumed 2/2 ICM last EF 42%), PVD, COPD, AAA presenting with abdominal pain s/p EVAR with course complicated by hypotension (possible shock) in setting of decompensated systolic heart failure and NSTEMI.    NSTEMI (non-ST elevated myocardial infarction)  Unclear etiology,  possible 2/2 to demand given hypotension, though ischemia itself may be driving his hypotension. EKG changes (anterolateral TWIs), trop elevation noted.    - please DC trop checks in abscence of new symptoms  - s/p , ordered for ASA 81 qd  - continue high intenity statin  - heparin gtt ordered, will continue at least 48 hrs (discussed with surgery)  - holding on BB for now  - Will consider for noninvasive ischemic eval when more stable.       Acute on chronic combined systolic and diastolic congestive heart failure  Presumed 2/2 ICM in setting of CAD, prior CABG. Unknown functional status at baseline    RECS  - Wean dobutamine as toelrated  - Would continue diuresis with Lasix IV 40mg bid (ordered)  - Strict I/Os  - please obtain bid lytes, replete K>4, Mg>2  - Will hold on additional goal directed medical therapy given decompensation and possible shock (unable to verify etiology without CVPs, mVO2)        VTE Risk Mitigation (From admission, onward)        Ordered     IP VTE HIGH RISK PATIENT  Once      07/07/19 0131          Elvis Muñoz MD  Cardiology  [facility]

## 2019-07-09 NOTE — ASSESSMENT & PLAN NOTE
Unclear etiology, possible 2/2 to demand given hypotension, though ischemia itself may be driving his hypotension. EKG changes (anterolateral TWIs), trop elevation noted.    - s/p , ordered for ASA 81 qd  - continue high intenity statin  - heparin gtt ordered, will continue at least 48 hrs (discussed with surgery)  - holding on BB for now given hypotension, will consider in AM  - Will consider for LHC when more stable. May also have to consider in setting of worsening hemodynamic instability of workup suggestive of cardiogenic shock

## 2019-07-09 NOTE — ASSESSMENT & PLAN NOTE
63-year-old male with acute on chronic CHF exacerbation found to have a AAA enlarged to greater than 6cm from previous now s/p EVAR.     Neuro:  -Propofol for sedation  -fentanyl for analgesia   -wean to extubate    Cards:  Currently with CHF exacerbation.  -SVT overnight. EKG showed abnormal ST segments and inverted t waves   -started on heparin per ACS protocol  -Diuresis started 7/8. Will continue w/  -Echo 7/8: 25% EF  -keep systolics less than 140 and HR normal    Resp:  -Intubated on minimal vent settings   -plan for SBT and possibly extubate today  -daily cxr while intubated.     FENGI  -NPO while intubated  -replace lytes as needed  -GI ppx    Heme:  CBC stable post op. Doing well.   -trend H/H  -DVT ppx    Renal:  Baseline CKD with admitting creatinine of 1.4.   -trend renal function  -continue diuresis as kidneys tolerate    Endo:  Blood sugars stable. No history of diabetes.   -monitor blood sugar.     ID:  -Continue prophylactic abx for 2 doses post op  -started on vanc for elevated temps and wbc's. Pending cultres  -trend white count    Dispo: Continue ICU care while intubated. Plan for SBT and possibly extubate today

## 2019-07-09 NOTE — SUBJECTIVE & OBJECTIVE
Interval History: Patient extubated, endorsing some groin pain at site of EVAR groin puncture, no bleeding events. Denies chest pain, shortness of breath, dyspnea on exertion.      Review of Systems   Constitution: Negative. Negative for chills and fever.   HENT: Negative.    Eyes: Negative.    Cardiovascular: Negative for chest pain, claudication and paroxysmal nocturnal dyspnea.        Groin pain at EVAR site   Respiratory: Negative for cough, shortness of breath and wheezing.    Endocrine: Negative.    Hematologic/Lymphatic: Does not bruise/bleed easily.   Skin: Negative for nail changes and rash.   Musculoskeletal: Negative.  Negative for back pain.   Gastrointestinal: Negative for abdominal pain, melena, nausea and vomiting.   Neurological: Negative for dizziness and headaches.   Psychiatric/Behavioral: Negative for altered mental status, depression and substance abuse.   Allergic/Immunologic: Negative.      Objective:     Vital Signs (Most Recent):  Temp: 97.7 °F (36.5 °C) (07/09/19 1500)  Pulse: 103 (07/09/19 1715)  Resp: 17 (07/09/19 1715)  BP: (!) 106/59 (07/09/19 1700)  SpO2: 98 % (07/09/19 1715) Vital Signs (24h Range):  Temp:  [97.7 °F (36.5 °C)-101.5 °F (38.6 °C)] 97.7 °F (36.5 °C)  Pulse:  [] 103  Resp:  [1-32] 17  SpO2:  [81 %-100 %] 98 %  BP: ()/(50-88) 106/59  Arterial Line BP: ()/() 108/44     Weight: 63 kg (138 lb 14.2 oz)  Body mass index is 21.75 kg/m².     SpO2: 98 %  O2 Device (Oxygen Therapy): room air      Intake/Output Summary (Last 24 hours) at 7/9/2019 1728  Last data filed at 7/9/2019 1724  Gross per 24 hour   Intake 2683.2 ml   Output 3232 ml   Net -548.8 ml       Lines/Drains/Airways     Drain                 Urethral Catheter 07/07/19 1610 Non-latex 16 Fr. 2 days         NG/OG Tube 07/08/19 0126 Center mouth 1 day          Arterial Line                 Arterial Line 07/08/19 0125 Left Radial 1 day          Peripheral Intravenous Line                 Peripheral  IV - Single Lumen 07/07/19 1500 18 G Left Forearm 2 days         Peripheral IV - Single Lumen 07/1955 18 G Right Antecubital 1 day         Peripheral IV - Single Lumen 07/08/19 0029 20 G Right Forearm 1 day                Physical Exam   Constitutional: He is oriented to person, place, and time. He appears well-developed and well-nourished.   HENT:   Head: Normocephalic and atraumatic.   Eyes: Pupils are equal, round, and reactive to light. Conjunctivae and EOM are normal.   Neck: No JVD present.   Cardiovascular: Normal rate, regular rhythm, normal heart sounds and intact distal pulses. Exam reveals no gallop and no friction rub.   No murmur heard.  2/6 apical systolic murmur    Pulmonary/Chest: Effort normal. No stridor. He has no wheezes. He has no rales. He exhibits no tenderness.   Abdominal: Soft. Bowel sounds are normal. He exhibits no distension and no mass. There is no tenderness. There is no guarding.   Musculoskeletal: He exhibits no edema, tenderness or deformity.   Neurological: He is alert and oriented to person, place, and time.   Skin: Skin is warm and dry. No rash noted. No erythema.   Psychiatric: He has a normal mood and affect.       Significant Labs:  Lab Results   Component Value Date    WBC 17.49 (H) 07/09/2019    HGB 12.1 (L) 07/09/2019    HCT 36.9 (L) 07/09/2019    MCV 80 (L) 07/09/2019     (L) 07/09/2019       CMP  Sodium   Date Value Ref Range Status   07/09/2019 135 (L) 136 - 145 mmol/L Final     Potassium   Date Value Ref Range Status   07/09/2019 4.0 3.5 - 5.1 mmol/L Final     Chloride   Date Value Ref Range Status   07/09/2019 98 95 - 110 mmol/L Final     CO2   Date Value Ref Range Status   07/09/2019 27 23 - 29 mmol/L Final     Glucose   Date Value Ref Range Status   07/09/2019 92 70 - 110 mg/dL Final     BUN, Bld   Date Value Ref Range Status   07/09/2019 23 8 - 23 mg/dL Final     Creatinine   Date Value Ref Range Status   07/09/2019 1.4 0.5 - 1.4 mg/dL Final     Calcium    Date Value Ref Range Status   07/09/2019 9.3 8.7 - 10.5 mg/dL Final     Total Protein   Date Value Ref Range Status   07/09/2019 6.3 6.0 - 8.4 g/dL Final     Albumin   Date Value Ref Range Status   07/09/2019 2.2 (L) 3.5 - 5.2 g/dL Final     Total Bilirubin   Date Value Ref Range Status   07/09/2019 0.9 0.1 - 1.0 mg/dL Final     Comment:     For infants and newborns, interpretation of results should be based  on gestational age, weight and in agreement with clinical  observations.  Premature Infant recommended reference ranges:  Up to 24 hours.............<8.0 mg/dL  Up to 48 hours............<12.0 mg/dL  3-5 days..................<15.0 mg/dL  6-29 days.................<15.0 mg/dL       Alkaline Phosphatase   Date Value Ref Range Status   07/09/2019 62 55 - 135 U/L Final     AST   Date Value Ref Range Status   07/09/2019 15 10 - 40 U/L Final     ALT   Date Value Ref Range Status   07/09/2019 5 (L) 10 - 44 U/L Final     Anion Gap   Date Value Ref Range Status   07/09/2019 10 8 - 16 mmol/L Final     eGFR if    Date Value Ref Range Status   07/09/2019 >60.0 >60 mL/min/1.73 m^2 Final     eGFR if non    Date Value Ref Range Status   07/09/2019 53.1 (A) >60 mL/min/1.73 m^2 Final     Comment:     Calculation used to obtain the estimated glomerular filtration  rate (eGFR) is the CKD-EPI equation.            Significant Imaging: Echocardiogram:   Transthoracic echo (TTE) complete (Cupid Only):   Results for orders placed or performed during the hospital encounter of 07/07/19   Transthoracic echo (TTE) 2D with Color Flow   Result Value Ref Range    Ascending aorta 3.07 cm    STJ 3.14 cm    AV mean gradient 2 mmHg    Ao peak aaliyah 0.90 m/s    Ao VTI 12.22 cm    IVS 1.01 0.6 - 1.1 cm    LA size 4.50 cm    Left Atrium Major Axis 5.88 cm    Left Atrium Minor Axis 5.21 cm    LVIDD 5.76 3.5 - 6.0 cm    LVIDS 4.88 (A) 2.1 - 4.0 cm    LVOT diameter 2.22 cm    LVOT peak VTI 10.53 cm    PW 0.62 0.6 - 1.1  cm    RA Major Axis 5.72 cm    RA Width 3.28 cm    RVDD 2.62 cm    Sinus 3.18 cm    TAPSE 1.33 cm    TDI LATERAL 0.17 m/s    TDI SEPTAL 0.09 m/s    LA WIDTH 4.05 cm    LV Diastolic Volume 164.01 mL    LV Systolic Volume 111.91 mL    LVOT peak aaliyah 0.74 m/s    FS 15 %    LA volume 85.59 cm3    LV mass 177.38 g    Left Ventricle Relative Wall Thickness 0.22 cm    AV valve area 3.33 cm2    AV Velocity Ratio 0.82     AV index (prosthetic) 0.86     Mean e' 0.13 m/s    LVOT area 3.9 cm2    LVOT stroke volume 40.74 cm3    AV peak gradient 3 mmHg    LV Systolic Volume Index 64.8 mL/m2    LV Diastolic Volume Index 94.97 mL/m2    LA Volume Index 49.6 mL/m2    LV Mass Index 103 g/m2    BSA 1.72 m2    Narrative    · Severely decreased left ventricular systolic function with evidence of   basal inferior akinesis. The estimated ejection fraction is 25%.  · Mildly to moderately reduced right ventricular systolic function.  · Left ventricular diastolic dysfunction.  · Severe left atrial enlargement.  · Mild mitral regurgitation.  · Mechanically ventilated; cannot use inferior caval vein diameter to   estimate central venous pressure.

## 2019-07-09 NOTE — PROGRESS NOTES
PTT 91 and heparin drip turned off, repeat 100.7. MD notified. Ordered to keep heparin drip off. Will continue to monitor.

## 2019-07-09 NOTE — PROGRESS NOTES
[facility]  Critical Care - Surgery  Progress Note    Patient Name: Yogesh Lima  MRN: [id number]  Admission Date: 7/7/2019  Hospital Length of Stay: 2 days  Code Status: Prior  Attending Provider: Hilton Hanson MD  Primary Care Provider: [facility]   Principal Problem: AAA (abdominal aortic aneurysm) without rupture    Subjective:     Hospital/ICU Course:  63-year-old male with history of HTN, HLD, current smoker, CAD s/p multi-vessel CABG (1993), heart failure, COPD, and known AAA (discovered April 2019)  who presented as transfer from OSH given symptomatic enlarging AAA. He presented to OSH this evening given abdominal pain of several days, worsening in nature, worst in LLQ and radiating to his back. He also was complaining of bilateral lower extremity edema, shortness of breath and dyspnea on exertion. BNP was 16,200. CT abdomen was performed given abdominal pain and history of AAA and his aneurysm was noted to be enlarged to 6.1cm from previous diameter of 4.8cm. Given this he was transferred to [facility] for emergent vascular surgery intervention. He's now s/p EVAR and remains intubated off pressors.         Interval History/Significant Events: Febrile overnight, tmax 101.5. Blood and resp cultures: NGTD. Went into SVT 7/8. EKG showed ST abnormality and t wave inversions. Started on heparin per ACS protocol. ECHO on 7/8 showed EF 25%. Plan today: SBT and possible extubate.     Follow-up For: Procedure(s) (LRB):  REPAIR-ANEURYSM-ABDOMINAL AORTIC-ENDOVASCULAR (AAA) (Bilateral)    Post-Operative Day: 2 Days Post-Op    Objective:     Vital Signs (Most Recent):  Temp: 98.7 °F (37.1 °C) (07/09/19 0700)  Pulse: 84 (07/09/19 0715)  Resp: 17 (07/09/19 0715)  BP: 126/66 (07/09/19 0700)  SpO2: 100 % (07/09/19 0715) Vital Signs (24h Range):  Temp:  [98.1 °F (36.7 °C)-101.5 °F (38.6 °C)] 98.7 °F (37.1 °C)  Pulse:  [] 84  Resp:  [1-27] 17  SpO2:  [93 %-100 %] 100 %  BP:  ()/(47-76) 126/66  Arterial Line BP: ()/() 137/64     Weight: 63 kg (138 lb 14.2 oz)  Body mass index is 21.75 kg/m².    Intake/Output Summary (Last 24 hours) at 7/9/2019 0727  Last data filed at 7/9/2019 0700  Gross per 24 hour   Intake 2622.2 ml   Output 3639 ml   Net -1016.8 ml       Physical Exam   Constitutional: He appears well-developed and well-nourished.   HENT:   Head: Normocephalic and atraumatic.   Intubated and sedated   Cardiovascular: Normal rate and regular rhythm.   Pulmonary/Chest:   Mechanical ventilation   Abdominal: Soft. Bowel sounds are normal.   Genitourinary:   Genitourinary Comments: Mari cath in place   Neurological:   Intubated and sedated   Skin: Skin is warm and dry.       Vents:  Vent Mode: A/C (07/09/19 0514)  Ventilator Initiated: Yes (07/07/19 2318)  Set Rate: 16 bmp (07/09/19 0514)  Vt Set: 380 mL (07/09/19 0514)  PEEP/CPAP: 5 cmH20 (07/09/19 0514)  Oxygen Concentration (%): 40 (07/09/19 0715)  Peak Airway Pressure: 13 cmH2O (07/09/19 0514)  Plateau Pressure: 12 cmH20 (07/09/19 0514)  Total Ve: 12.5 mL (07/09/19 0514)  F/VT Ratio<105 (RSBI): (!) 34.76 (07/09/19 0514)    Lines/Drains/Airways     Drain                 NG/OG Tube 07/08/19 0126 Center mouth 1 day         Urethral Catheter 07/07/19 1610 Non-latex 16 Fr. 1 day          Airway                 Airway - Non-Surgical 07/07/19 2029 Endotracheal Tube 1 day          Arterial Line                 Arterial Line 07/08/19 0125 Left Radial 1 day          Peripheral Intravenous Line                 Peripheral IV - Single Lumen 07/07/19 1500 18 G Left Forearm 1 day         Peripheral IV - Single Lumen 07/1955 18 G Right Antecubital 1 day         Peripheral IV - Single Lumen 07/08/19 0029 20 G Right Forearm 1 day                Significant Labs:    CBC/Anemia Profile:  Recent Labs   Lab 07/08/19  1721 07/08/19  1821 07/09/19  0251   WBC 15.85* 16.43* 16.14*   HGB 11.4* 11.3* 12.0*   HCT 36.9* 35.0* 37.0*   PLT  88* 93* 92*   MCV 83 79* 79*   RDW 16.4* 16.4* 16.5*        Chemistries:  Recent Labs   Lab 07/08/19  0400  07/08/19  1721 07/08/19  2214 07/09/19  0100 07/09/19  0355      < > 138  --  138 137   K 4.0   < > 4.7 4.7 4.5 4.3      < > 102  --  100 99   CO2 26   < > 26  --  27 26   BUN 24*   < > 22  --  21 21   CREATININE 1.3   < > 1.5*  --  1.4 1.4   CALCIUM 8.2*   < > 8.4*  --  8.7 8.6*   ALBUMIN 2.4*  --  2.5*  --   --  2.4*   PROT 5.5*  --  6.1  --   --  6.4   BILITOT 0.3  --  0.6  --   --  0.9   ALKPHOS 65  --  70  --   --  68   ALT 17  --  11  --   --  7*   AST 13  --  18  --   --  23   MG 2.3   < > 2.0  --  2.0 1.9   PHOS 4.0   < > 4.2  --  4.6* 4.4    < > = values in this interval not displayed.       ABGs:   Recent Labs   Lab 07/09/19  0327   PH 7.485*   PCO2 33.4*   HCO3 25.1   POCSATURATED 100   BE 2     Blood Culture:   Recent Labs   Lab 07/08/19  1715 07/08/19  1730   LABBLOO No Growth to date No Growth to date     Respiratory Culture:   Recent Labs   Lab 07/08/19  1733   GSRESP <10 epithelial cells per low power field.  Many WBC's  No organisms seen       Significant Imaging:  I have reviewed all pertinent imaging results/findings within the past 24 hours.    Assessment/Plan:     Aneurysm of infrarenal abdominal aorta  63-year-old male with acute on chronic CHF exacerbation found to have a AAA enlarged to greater than 6cm from previous now s/p EVAR.     Neuro:  -Propofol for sedation  -fentanyl for analgesia   -wean to extubate    Cards:  Currently with CHF exacerbation.  -SVT overnight. EKG showed abnormal ST segments and inverted t waves   -started on heparin per ACS protocol  -Diuresis started 7/8. Will continue w/  -Echo 7/8: 25% EF  -keep systolics less than 140 and HR normal    Resp:  -Intubated on minimal vent settings   -plan for SBT and possibly extubate today  -daily cxr while intubated.     JUHII  -NPO while intubated  -replace lytes as needed  -GI ppx    Heme:  CBC stable post  op. Doing well.   -trend H/H  -DVT ppx    Renal:  Baseline CKD with admitting creatinine of 1.4.   -trend renal function  -continue diuresis as kidneys tolerate    Endo:  Blood sugars stable. No history of diabetes.   -monitor blood sugar.     ID:  -Continue prophylactic abx for 2 doses post op  -started on vanc for elevated temps and wbc's. Pending cultres  -trend white count    Dispo: Continue ICU care while intubated. Plan for SBT and possibly extubate today         Lexis Choudhury MD  Critical Care - Surgery  [facility]

## 2019-07-09 NOTE — PLAN OF CARE
"Problem: Adult Inpatient Plan of Care  Goal: Plan of Care Review  Outcome: Ongoing (interventions implemented as appropriate)  Dx: AAA (abdominal aortic aneurysm) without rupture    Shift Events: T max 101.4 and cultures sent. Precedex started. Dobutamine started at 2.5.  Potassium replaced throughout shift. Cardiology consulted. Echo done. Vascular studies done.     Neuro: Sedated, Arouses to Voice, Follows Commands and Moves All Extremities    Vital Signs: BP (!) 108/57 (BP Location: Left arm, Patient Position: Lying)   Pulse 94   Temp (!) 101.4 °F (38.6 °C) (Oral)   Resp 16   Ht 5' 7" (1.702 m)   Wt 62.6 kg (138 lb)   SpO2 98%   BMI 21.61 kg/m²     Diet: NPO    Gtts: Precedex, Dobutamine, Heparin and MIVF    Urine Output: Urinary Catheter  cc/hour    Drains: NG/OG Tube 150 cc /  shift    Restraints Restraints in place. No injuries noted. Will remove when appropriate.    Labs/Accuchecks: Labs monitored, electrolytes replaced as needed. No scheduled accuchecks..    Skin: See Epic flowsheets. Heel boots in place. Repositioned frequently.          "

## 2019-07-10 NOTE — SUBJECTIVE & OBJECTIVE
Past Medical History:   Diagnosis Date    Coronary artery disease     Hyperlipidemia     Hypertension        Past Surgical History:   Procedure Laterality Date    REPAIR-ANEURYSM-ABDOMINAL AORTIC-ENDOVASCULAR (AAA) Bilateral 7/7/2019    Performed by Hilton Hanson MD at [facility] OR [address] [place] [address]       Review of patient's allergies indicates:  No Known Allergies    PTA Medications   Medication Sig    aspirin (ECOTRIN) 81 MG EC tablet Take 1 tablet (81 mg total) by mouth once daily.    atorvastatin (LIPITOR) 80 MG tablet Take 1 tablet (80 mg total) by mouth once daily.    carvedilol (COREG) 3.125 MG tablet Take 1 tablet (3.125 mg total) by mouth 2 (two) times daily.    clopidogrel (PLAVIX) 75 mg tablet Take 1 tablet (75 mg total) by mouth once daily.    furosemide (LASIX) 40 MG tablet Take 1 tablet (40 mg total) by mouth once daily.    lisinopril (PRINIVIL,ZESTRIL) 2.5 MG tablet Take 1 tablet (2.5 mg total) by mouth once daily.    mirtazapine (REMERON) 15 MG tablet Take 15 mg by mouth every evening.      polyethylene glycol (GLYCOLAX) 17 gram PwPk Take 17 g by mouth 2 (two) times daily as needed (constipation).    tamsulosin (FLOMAX) 0.4 mg Cap Take 1 capsule (0.4 mg total) by mouth once daily.    tiotropium (SPIRIVA) 18 mcg inhalation capsule Inhale 1 capsule (18 mcg total) into the lungs once daily. Controller     Family History     Problem Relation (Age of Onset)    No Known Problems Mother, Father        Tobacco Use    Smoking status: Current Every Day Smoker     Packs/day: 1.00     Years: 53.00     Pack years: 53.00     Types: Cigarettes    Smokeless tobacco: Never Used   Substance and Sexual Activity    Alcohol use: Not on file    Drug use: Not on file    Sexual activity: Not on file     Review of Systems   Unable to perform ROS: mental status change     Objective:     Vital Signs (Most Recent):  Temp: 97.7 °F (36.5 °C) (07/10/19 1100)  Pulse: 92 (07/10/19 1415)  Resp: (!) 22 (07/10/19 1415)  BP: (!)  102/56 (07/10/19 1400)  SpO2: 96 % (07/10/19 1415) Vital Signs (24h Range):  Temp:  [97.7 °F (36.5 °C)-98.7 °F (37.1 °C)] 97.7 °F (36.5 °C)  Pulse:  [] 92  Resp:  [8-32] 22  SpO2:  [83 %-100 %] 96 %  BP: ()/(51-88) 102/56  Arterial Line BP: ()/() 92/45     Weight: 62 kg (136 lb 11 oz)  Body mass index is 21.41 kg/m².    SpO2: 96 %  O2 Device (Oxygen Therapy): room air      Intake/Output Summary (Last 24 hours) at 7/10/2019 1457  Last data filed at 7/10/2019 1400  Gross per 24 hour   Intake 1130.2 ml   Output 3015 ml   Net -1884.8 ml       Lines/Drains/Airways     Drain                 Urethral Catheter 07/07/19 1610 Non-latex 16 Fr. 2 days          Arterial Line                 Arterial Line 07/08/19 0125 Left Radial 2 days          Peripheral Intravenous Line                 Peripheral IV - Single Lumen 07/07/19 1500 18 G Left Forearm 2 days         Peripheral IV - Single Lumen 07/08/19 0029 20 G Right Forearm 2 days                Physical Exam   Constitutional: No distress.   Frail and not able to respond to questions    HENT:   Head: Normocephalic.   Eyes: Right eye exhibits no discharge. Left eye exhibits no discharge.   Neck: Normal range of motion. No thyromegaly present.   Cardiovascular: Normal rate.   Murmur heard.  Pulmonary/Chest: He has no wheezes. He has rales.   Abdominal: Soft. He exhibits no distension.   Skin: Skin is warm. No erythema.       Significant Labs:   CMP   Recent Labs   Lab 07/09/19  0355 07/09/19  1458 07/10/19  0141 07/10/19  0440    135* 131* 132*   K 4.3 4.0 3.6 3.5   CL 99 98 94* 95   CO2 26 27 26 27   GLU 84 92 70 95   BUN 21 23 26* 27*   CREATININE 1.4 1.4 1.4 1.4   CALCIUM 8.6* 9.3 9.1 8.8   PROT 6.4 6.3  --  6.2   ALBUMIN 2.4* 2.2*  --  2.2*   BILITOT 0.9 0.9  --  0.7   ALKPHOS 68 62  --  62   AST 23 15  --  35   ALT 7* 5*  --  7*   ANIONGAP 12 10 11 10   ESTGFRAFRICA >60.0 >60.0 >60.0 >60.0   EGFRNONAA 53.1* 53.1* 52.7* 52.7*    and CBC   Recent  Labs   Lab 07/09/19  0251 07/09/19  1458 07/10/19  0440   WBC 16.14* 17.49* 15.01*   HGB 12.0* 12.1* 11.3*   HCT 37.0* 36.9* 34.6*   PLT 92* 106* 113*       Significant Imaging: Cardiac Cath: see above,

## 2019-07-10 NOTE — PROGRESS NOTES
[facility]  Cardiology  Progress Note    Patient Name: Yogesh Lima  MRN: [id number]  Admission Date: 7/7/2019  Hospital Length of Stay: 3 days  Code Status: Prior   Attending Physician: Hilton Hanson MD   Primary Care Physician: [facility]  Expected Discharge Date: 7/12/2019  Principal Problem:AAA (abdominal aortic aneurysm) without rupture    Subjective:     Hospital Course:   No notes on file    Interval History: feeling well, decreased pain at groin site. Reportedly some difficulty with weaning of dobutamine gtt.    Review of Systems   Constitution: Negative. Negative for chills and fever.   HENT: Negative.    Eyes: Negative.    Cardiovascular: Negative for chest pain, claudication and paroxysmal nocturnal dyspnea.   Respiratory: Negative for cough, shortness of breath and wheezing.    Endocrine: Negative.    Hematologic/Lymphatic: Does not bruise/bleed easily.   Skin: Negative for nail changes and rash.   Musculoskeletal: Negative.  Negative for back pain.   Gastrointestinal: Negative for abdominal pain, melena, nausea and vomiting.   Neurological: Negative for dizziness and headaches.   Psychiatric/Behavioral: Negative for altered mental status, depression and substance abuse.   Allergic/Immunologic: Negative.      Objective:     Vital Signs (Most Recent):  Temp: 97.7 °F (36.5 °C) (07/10/19 1100)  Pulse: (!) 114 (07/10/19 1216)  Resp: (!) 23 (07/10/19 1216)  BP: 105/60 (07/10/19 1200)  SpO2: (!) 94 % (07/10/19 1216) Vital Signs (24h Range):  Temp:  [97.7 °F (36.5 °C)-98.7 °F (37.1 °C)] 97.7 °F (36.5 °C)  Pulse:  [] 114  Resp:  [8-32] 23  SpO2:  [83 %-100 %] 94 %  BP: ()/(51-88) 105/60  Arterial Line BP: ()/() 117/59     Weight: 62 kg (136 lb 11 oz)  Body mass index is 21.41 kg/m².     SpO2: (!) 94 %  O2 Device (Oxygen Therapy): room air      Intake/Output Summary (Last 24 hours) at 7/10/2019 1346  Last data filed at 7/10/2019 1200  Gross per  24 hour   Intake 1130.2 ml   Output 3015 ml   Net -1884.8 ml       Lines/Drains/Airways     Drain                 Urethral Catheter 07/07/19 1610 Non-latex 16 Fr. 2 days          Arterial Line                 Arterial Line 07/08/19 0125 Left Radial 2 days          Peripheral Intravenous Line                 Peripheral IV - Single Lumen 07/07/19 1500 18 G Left Forearm 2 days         Peripheral IV - Single Lumen 07/08/19 0029 20 G Right Forearm 2 days                Physical Exam   Constitutional: He is oriented to person, place, and time. He appears well-developed and well-nourished.   HENT:   Head: Normocephalic and atraumatic.   Eyes: Pupils are equal, round, and reactive to light. Conjunctivae and EOM are normal.   Neck: No JVD present.   Cardiovascular: Normal rate, regular rhythm, normal heart sounds and intact distal pulses. Exam reveals no gallop and no friction rub.   No murmur heard.  Pulmonary/Chest: Effort normal. No stridor. He has no wheezes. He has no rales. He exhibits no tenderness.   Abdominal: Soft. Bowel sounds are normal. He exhibits no distension and no mass. There is no tenderness. There is no guarding.   Musculoskeletal: He exhibits no edema, tenderness or deformity.   Neurological: He is alert and oriented to person, place, and time.   Skin: Skin is warm and dry. No rash noted. No erythema.   Psychiatric: He has a normal mood and affect.       Significant Labs:   CMP   Recent Labs   Lab 07/09/19  0355 07/09/19  1458 07/10/19  0141 07/10/19  0440    135* 131* 132*   K 4.3 4.0 3.6 3.5   CL 99 98 94* 95   CO2 26 27 26 27   GLU 84 92 70 95   BUN 21 23 26* 27*   CREATININE 1.4 1.4 1.4 1.4   CALCIUM 8.6* 9.3 9.1 8.8   PROT 6.4 6.3  --  6.2   ALBUMIN 2.4* 2.2*  --  2.2*   BILITOT 0.9 0.9  --  0.7   ALKPHOS 68 62  --  62   AST 23 15  --  35   ALT 7* 5*  --  7*   ANIONGAP 12 10 11 10   ESTGFRAFRICA >60.0 >60.0 >60.0 >60.0   EGFRNONAA 53.1* 53.1* 52.7* 52.7*    and CBC   Recent Labs   Lab  07/09/19  0251 07/09/19  1458 07/10/19  0440   WBC 16.14* 17.49* 15.01*   HGB 12.0* 12.1* 11.3*   HCT 37.0* 36.9* 34.6*   PLT 92* 106* 113*       Significant Imaging: Echocardiogram:   Transthoracic echo (TTE) complete (Cupid Only):   Results for orders placed or performed during the hospital encounter of 07/07/19   Transthoracic echo (TTE) 2D with Color Flow   Result Value Ref Range    Ascending aorta 3.07 cm    STJ 3.14 cm    AV mean gradient 2 mmHg    Ao peak aaliyah 0.90 m/s    Ao VTI 12.22 cm    IVS 1.01 0.6 - 1.1 cm    LA size 4.50 cm    Left Atrium Major Axis 5.88 cm    Left Atrium Minor Axis 5.21 cm    LVIDD 5.76 3.5 - 6.0 cm    LVIDS 4.88 (A) 2.1 - 4.0 cm    LVOT diameter 2.22 cm    LVOT peak VTI 10.53 cm    PW 0.62 0.6 - 1.1 cm    RA Major Axis 5.72 cm    RA Width 3.28 cm    RVDD 2.62 cm    Sinus 3.18 cm    TAPSE 1.33 cm    TDI LATERAL 0.17 m/s    TDI SEPTAL 0.09 m/s    LA WIDTH 4.05 cm    LV Diastolic Volume 164.01 mL    LV Systolic Volume 111.91 mL    LVOT peak aaliyah 0.74 m/s    FS 15 %    LA volume 85.59 cm3    LV mass 177.38 g    Left Ventricle Relative Wall Thickness 0.22 cm    AV valve area 3.33 cm2    AV Velocity Ratio 0.82     AV index (prosthetic) 0.86     Mean e' 0.13 m/s    LVOT area 3.9 cm2    LVOT stroke volume 40.74 cm3    AV peak gradient 3 mmHg    LV Systolic Volume Index 64.8 mL/m2    LV Diastolic Volume Index 94.97 mL/m2    LA Volume Index 49.6 mL/m2    LV Mass Index 103 g/m2    BSA 1.72 m2    Narrative    · Severely decreased left ventricular systolic function with evidence of   basal inferior akinesis. The estimated ejection fraction is 25%.  · Mildly to moderately reduced right ventricular systolic function.  · Left ventricular diastolic dysfunction.  · Severe left atrial enlargement.  · Mild mitral regurgitation.  · Mechanically ventilated; cannot use inferior caval vein diameter to   estimate central venous pressure.        Assessment and Plan:     Acute on chronic combined systolic and  diastolic congestive heart failure  62 y/o M with history of CAD s/p CABG 1993, HFrEF (presumed 2/2 ICM last EF 42%), PVD, COPD, AAA presenting with abdominal pain s/p EVAR with course complicated by hypotension (possible shock) in setting of decompensated systolic heart failure and NSTEMI.  Presumed 2/2 ICM in setting of CAD, prior CABG. Unknown functional status at baseline    - Patient euvolemic, holding additional diuresis for now  - Strict I/Os  - Will hold additional goal directed medical therapy given difficulty weaning off dobutamine, will consider for further afterload reduction tomorrow   - Would wean dobutamine as tolerated      NSTEMI (non-ST elevated myocardial infarction)  Unclear etiology, possible 2/2 to demand given hypotension, though ischemia itself may be driving his hypotension. EKG changes (anterolateral TWIs), trop elevation noted.    - s/p , continue ASA 81 qd, Plavix 75mg daily  - continue high intenity statin  - Will discuss case with interventional cardiology w possible consult - patient has likely underlying ischemia, which will be difficult to characterize further while on dobutamine gtt            VTE Risk Mitigation (From admission, onward)        Ordered     heparin (porcine) injection 5,000 Units  Every 8 hours      07/10/19 0959     IP VTE HIGH RISK PATIENT  Once      07/07/19 5210          Elvis Muñoz MD  Cardiology  [facility]

## 2019-07-10 NOTE — PLAN OF CARE
"Problem: Adult Inpatient Plan of Care  Goal: Plan of Care Review  Outcome: Ongoing (interventions implemented as appropriate)  Dx: AAA (abdominal aortic aneurysm) without rupture    Shift Events: Pt. Confused with agitation this shift. Disoriented to place and situation. Precedex started and now off. Ativan/Haldol given per Dr. De León. Hydralizine to maintain SBP <140. UO 900cc/shift. Dobutamine attempted to wean. Pt. Did not tolerate.    Neuro: Follows Commands, Moves All Extremities and Confused    Vital Signs: BP (!) 114/55   Pulse 93   Temp 98 °F (36.7 °C) (Oral)   Resp (!) 22   Ht 5' 7" (1.702 m)   Wt 62 kg (136 lb 11 oz)   SpO2 96%   BMI 21.41 kg/m²     Diet: NPO    Gtts: Dobutamine    Labs/Accuchecks: Pt. recieves 40mEq of K.    Skin: Breakdown on buttocks.     Pt. Updated on POC. All questions answered. Emotional support provided.          "

## 2019-07-10 NOTE — HPI
CAD underwent CABG twice (first one on 1993 and repeated CABG on 2001) CABG is (LIMA-LAD, SVG-D1, SVG-OM1, SVG-RCA). Previous ACS and multiple cardiac interventions as follow:     Previous Angios:  10/26/2005 left SUBCL, patent;   10/12/2005 distal left SFA, 90% stenosis;   10/09/2005 ZAMARRIPA to LAD, patent; distal SVG to RCA, 90% stenosis; proximal LM, patent; proximal RCA, occluded;   CABG x 2 (1993 SVG to RCA (single graft), SVG to OM1 (single graft))       Status (10/09/2005  SVG to RCA (single graft) patent, 10/26/2005  SVG to OM1 (single graft) patent)  CABG x 1 (2001 LIMA to LAD (single graft))       Status (10/09/2005  LIMA to LAD (single graft) patent)    Previous PCI:  S/P stent to LM  S/P PTA to left SFA, 10/26/2005   S/P coated stent, thrombectomy to SVG to RCA, 10/09/2005     He presented on 7/7/2019 with worsening abdominal pain from his underlying AAA (that was detected on April 2019) and volume overloaded. CT abdomen was performed given abdominal pain and history of AAA and his aneurysm was noted to be enlarged to 6.1cm from previous diameter of 4.8 cm. Underwent emergent Abdominal endovascular aneurysm repair (EVAR) on 7/7/2019. Post op, his blood pressure was controlled given the repair. However, his has episodes of being extremely hypotensive to 80/40 mmHg. Trop 0.05 > 0.09 > 0.05.       ECG: during the hypotensive episode was T wave inversion on lateral leads.     TTE 7/8/2019   · Severely decreased left ventricular systolic function with evidence of basal inferior akinesis. The estimated ejection fraction is 25%.  ·  GLobal hypokinesia     TTE 3/8/2019 EF  42%

## 2019-07-10 NOTE — ASSESSMENT & PLAN NOTE
62yo M with multiple medical co-morbidities who presented as transfer from OSH given symptomatic, enlarging AAA now s/p emergent EVAR on 7/7    Neuro: On precedex this morning for agitation, continue to monitor neuro status, may be withdrawaling - consider librium taper  Resp: Extubated yesterday, maintaining sats, wean O2 as tolerated, duonebs q4 in setting of known COPD, ABGs prn, continuous pulse ox  CV: TTE with evidence of CHF, EF 25%, BNP significantly elevated, hypotensive during the day improved with dobutamine, off this afternoon. Cards consulted, appreciate recs. Continue daily ASA, statin; SBP < 140, HR < 100;   FEN/GI: NPO for now until passes speech/swallow,replete lytes   Renal: 40 lasix BID today, monitor response, monitor BUN/Cr, maintain arreola for accurate I/Os for now - has previously had issues with retention but can consider d/c today when more alert  Heme/ID: H/H stable, WBC 15 today from 17, pan cultured yesterday, resp cultures with moraxella - consult ID, continue broad antibiotics for now  Endo: POCT blood glucose, no current issues, continue to monitor  Msk: No issues, continue to monitor    Ppx: H  Dispo: Continue ICU care, PT/OT

## 2019-07-10 NOTE — SUBJECTIVE & OBJECTIVE
Interval History: feeling well, decreased pain at groin site. Reportedly some difficulty with weaning of dobutamine gtt.    Review of Systems   Constitution: Negative. Negative for chills and fever.   HENT: Negative.    Eyes: Negative.    Cardiovascular: Negative for chest pain, claudication and paroxysmal nocturnal dyspnea.   Respiratory: Negative for cough, shortness of breath and wheezing.    Endocrine: Negative.    Hematologic/Lymphatic: Does not bruise/bleed easily.   Skin: Negative for nail changes and rash.   Musculoskeletal: Negative.  Negative for back pain.   Gastrointestinal: Negative for abdominal pain, melena, nausea and vomiting.   Neurological: Negative for dizziness and headaches.   Psychiatric/Behavioral: Negative for altered mental status, depression and substance abuse.   Allergic/Immunologic: Negative.      Objective:     Vital Signs (Most Recent):  Temp: 97.7 °F (36.5 °C) (07/10/19 1100)  Pulse: (!) 114 (07/10/19 1216)  Resp: (!) 23 (07/10/19 1216)  BP: 105/60 (07/10/19 1200)  SpO2: (!) 94 % (07/10/19 1216) Vital Signs (24h Range):  Temp:  [97.7 °F (36.5 °C)-98.7 °F (37.1 °C)] 97.7 °F (36.5 °C)  Pulse:  [] 114  Resp:  [8-32] 23  SpO2:  [83 %-100 %] 94 %  BP: ()/(51-88) 105/60  Arterial Line BP: ()/() 117/59     Weight: 62 kg (136 lb 11 oz)  Body mass index is 21.41 kg/m².     SpO2: (!) 94 %  O2 Device (Oxygen Therapy): room air      Intake/Output Summary (Last 24 hours) at 7/10/2019 1346  Last data filed at 7/10/2019 1200  Gross per 24 hour   Intake 1130.2 ml   Output 3015 ml   Net -1884.8 ml       Lines/Drains/Airways     Drain                 Urethral Catheter 07/07/19 1610 Non-latex 16 Fr. 2 days          Arterial Line                 Arterial Line 07/08/19 0125 Left Radial 2 days          Peripheral Intravenous Line                 Peripheral IV - Single Lumen 07/07/19 1500 18 G Left Forearm 2 days         Peripheral IV - Single Lumen 07/08/19 0029 20 G Right Forearm  2 days                Physical Exam   Constitutional: He is oriented to person, place, and time. He appears well-developed and well-nourished.   HENT:   Head: Normocephalic and atraumatic.   Eyes: Pupils are equal, round, and reactive to light. Conjunctivae and EOM are normal.   Neck: No JVD present.   Cardiovascular: Normal rate, regular rhythm, normal heart sounds and intact distal pulses. Exam reveals no gallop and no friction rub.   No murmur heard.  Pulmonary/Chest: Effort normal. No stridor. He has no wheezes. He has no rales. He exhibits no tenderness.   Abdominal: Soft. Bowel sounds are normal. He exhibits no distension and no mass. There is no tenderness. There is no guarding.   Musculoskeletal: He exhibits no edema, tenderness or deformity.   Neurological: He is alert and oriented to person, place, and time.   Skin: Skin is warm and dry. No rash noted. No erythema.   Psychiatric: He has a normal mood and affect.       Significant Labs:   CMP   Recent Labs   Lab 07/09/19  0355 07/09/19  1458 07/10/19  0141 07/10/19  0440    135* 131* 132*   K 4.3 4.0 3.6 3.5   CL 99 98 94* 95   CO2 26 27 26 27   GLU 84 92 70 95   BUN 21 23 26* 27*   CREATININE 1.4 1.4 1.4 1.4   CALCIUM 8.6* 9.3 9.1 8.8   PROT 6.4 6.3  --  6.2   ALBUMIN 2.4* 2.2*  --  2.2*   BILITOT 0.9 0.9  --  0.7   ALKPHOS 68 62  --  62   AST 23 15  --  35   ALT 7* 5*  --  7*   ANIONGAP 12 10 11 10   ESTGFRAFRICA >60.0 >60.0 >60.0 >60.0   EGFRNONAA 53.1* 53.1* 52.7* 52.7*    and CBC   Recent Labs   Lab 07/09/19  0251 07/09/19  1458 07/10/19  0440   WBC 16.14* 17.49* 15.01*   HGB 12.0* 12.1* 11.3*   HCT 37.0* 36.9* 34.6*   PLT 92* 106* 113*       Significant Imaging: Echocardiogram:   Transthoracic echo (TTE) complete (Cupid Only):   Results for orders placed or performed during the hospital encounter of 07/07/19   Transthoracic echo (TTE) 2D with Color Flow   Result Value Ref Range    Ascending aorta 3.07 cm    STJ 3.14 cm    AV mean gradient 2 mmHg     Ao peak aaliyah 0.90 m/s    Ao VTI 12.22 cm    IVS 1.01 0.6 - 1.1 cm    LA size 4.50 cm    Left Atrium Major Axis 5.88 cm    Left Atrium Minor Axis 5.21 cm    LVIDD 5.76 3.5 - 6.0 cm    LVIDS 4.88 (A) 2.1 - 4.0 cm    LVOT diameter 2.22 cm    LVOT peak VTI 10.53 cm    PW 0.62 0.6 - 1.1 cm    RA Major Axis 5.72 cm    RA Width 3.28 cm    RVDD 2.62 cm    Sinus 3.18 cm    TAPSE 1.33 cm    TDI LATERAL 0.17 m/s    TDI SEPTAL 0.09 m/s    LA WIDTH 4.05 cm    LV Diastolic Volume 164.01 mL    LV Systolic Volume 111.91 mL    LVOT peak aaliyah 0.74 m/s    FS 15 %    LA volume 85.59 cm3    LV mass 177.38 g    Left Ventricle Relative Wall Thickness 0.22 cm    AV valve area 3.33 cm2    AV Velocity Ratio 0.82     AV index (prosthetic) 0.86     Mean e' 0.13 m/s    LVOT area 3.9 cm2    LVOT stroke volume 40.74 cm3    AV peak gradient 3 mmHg    LV Systolic Volume Index 64.8 mL/m2    LV Diastolic Volume Index 94.97 mL/m2    LA Volume Index 49.6 mL/m2    LV Mass Index 103 g/m2    BSA 1.72 m2    Narrative    · Severely decreased left ventricular systolic function with evidence of   basal inferior akinesis. The estimated ejection fraction is 25%.  · Mildly to moderately reduced right ventricular systolic function.  · Left ventricular diastolic dysfunction.  · Severe left atrial enlargement.  · Mild mitral regurgitation.  · Mechanically ventilated; cannot use inferior caval vein diameter to   estimate central venous pressure.

## 2019-07-10 NOTE — ASSESSMENT & PLAN NOTE
62 y/o M with history of CAD s/p CABG 1993, HFrEF (presumed 2/2 ICM last EF 42%), PVD, COPD, AAA presenting with abdominal pain s/p EVAR with course complicated by hypotension (possible shock) in setting of decompensated systolic heart failure and NSTEMI.      Presumed 2/2 ICM in setting of CAD, prior CABG. Unknown functional status at baseline    RECS  - Patient euvolemic, holding additional diuresis for now  - Strict I/Os  - Will hold additional goal directed medical therapy given difficulty weaning off dobutamine, will consider for further afterload reduction tomorrow   - Would wean dobutamine as tolerated

## 2019-07-10 NOTE — PROGRESS NOTES
Therapy with vancomycin complete and/or consult discontinued by provider.  Pharmacy will sign off, please re-consult as needed.    Inocencio Parsons, PharmD, BCPS  Medical/Surgical ICU Clinical Pharmacist  Spectralink: [id number]

## 2019-07-10 NOTE — ASSESSMENT & PLAN NOTE
Unclear etiology, possible 2/2 to demand given hypotension, though ischemia itself may be driving his hypotension. EKG changes (anterolateral TWIs), trop elevation noted.    - s/p , ordered for ASA 81 qd  - continue high intenity statin  - would prefer completion of 48 hrs heparin therapy if possible (s/p 24 hours, then discontinued)   - Will discuss case with interventional cardiology w possible consult - patient has likely underlying ischemia, which will be difficult to characterize further while on dobutamine gtt

## 2019-07-10 NOTE — ASSESSMENT & PLAN NOTE
- High suspicious is Type II NSTEMI (demand ischemia) especially in the setting of having hypotensive episode and provoked by underlying extensive coronary artery disease   - EVAR extended through common iliac arteries   - His troponin is improving (peaked at 0.09), ECG was consistent with ischemia changes on the episode of Hypotension, however, after that T wave is improving on repeat ECG     Recommendations:   - Continue on Aspirin 81 mg PO daily and Plavix 75 mg PO daily   - We recommend weaning off sedation (Precedex) as it may cause vasodilation and worsening shock   - We recommend discontinuing Dobutamine in the setting of having active ACS (NSTEMI)  - No current plan for LHC at this moment, defer to later date when more stable   - Discussed with interventional cardiology staff and primary team

## 2019-07-10 NOTE — PLAN OF CARE
"Problem: Adult Inpatient Plan of Care  Goal: Plan of Care Review  Dx: AAA (abdominal aortic aneurysm) without rupture    Shift Events: Patient extubated, on room air.  Heparin drip discontinued. Up in chair for 2 hours.      Neuro: AAO x4, Follows Commands and Moves All Extremities    Vital Signs: BP (!) 105/58   Pulse (!) 115   Temp 97.7 °F (36.5 °C) (Axillary)   Resp (!) 23   Ht 5' 7" (1.702 m)   Wt 63 kg (138 lb 14.2 oz)   SpO2 (!) 90%   BMI 21.75 kg/m²     Diet: Clear Liquid    Gtts: Dobutamine    Urine Output: Urinary Catheter 100 cc/hour     Labs/Accuchecks: Labs BID, accuchecks Q4    Skin: See flowsheets, sacral foam in place, heel boots in place.          "

## 2019-07-10 NOTE — CONSULTS
[facility]  Interventional Cardiology  Consult Note    Patient Name: Yogesh Lima  MRN: [id number]  Admission Date: 7/7/2019  Hospital Length of Stay: 3 days  Code Status: Prior   Attending Provider: Hilton Hanson MD  Consulting Provider: Cheikh Garland MD  Primary Care Physician: [facility]  Principal Problem:AAA (abdominal aortic aneurysm) without rupture    Patient information was obtained from past medical records and ER records.     Inpatient consult to Interventional Cardiology  Consult performed by: Cheikh Garland MD  Consult ordered by: Elvis Muñoz MD        Subjective:     Chief Complaint:  AAA      HPI:  CAD underwent CABG twice (first one on 1993 and repeated CABG on 2001) CABG is (LIMA-LAD, SVG-D1, SVG-OM1, SVG-RCA). Previous ACS and multiple cardiac interventions as follow:     Previous Angios:  10/26/2005 left SUBCL, patent;   10/12/2005 distal left SFA, 90% stenosis;   10/09/2005 ZAMARRIPA to LAD, patent; distal SVG to RCA, 90% stenosis; proximal LM, patent; proximal RCA, occluded;   CABG x 2 (1993 SVG to RCA (single graft), SVG to OM1 (single graft))       Status (10/09/2005  SVG to RCA (single graft) patent, 10/26/2005  SVG to OM1 (single graft) patent)  CABG x 1 (2001 LIMA to LAD (single graft))       Status (10/09/2005  LIMA to LAD (single graft) patent)    Previous PCI:  S/P stent to LM  S/P PTA to left SFA, 10/26/2005   S/P coated stent, thrombectomy to SVG to RCA, 10/09/2005     He presented on 7/7/2019 with worsening abdominal pain from his underlying AAA (that was detected on April 2019) and volume overloaded. CT abdomen was performed given abdominal pain and history of AAA and his aneurysm was noted to be enlarged to 6.1cm from previous diameter of 4.8 cm. Underwent emergent Abdominal endovascular aneurysm repair (EVAR) on 7/7/2019. Post op, his blood pressure was controlled given the repair. However, his has episodes of being extremely  hypotensive to 80/40 mmHg. Trop 0.05 > 0.09 > 0.05.       ECG: during the hypotensive episode was T wave inversion on lateral leads.     TTE 7/8/2019   · Severely decreased left ventricular systolic function with evidence of basal inferior akinesis. The estimated ejection fraction is 25%.  ·  GLobal hypokinesia     TTE 3/8/2019 EF  42%    Past Medical History:   Diagnosis Date    Coronary artery disease     Hyperlipidemia     Hypertension        Past Surgical History:   Procedure Laterality Date    REPAIR-ANEURYSM-ABDOMINAL AORTIC-ENDOVASCULAR (AAA) Bilateral 7/7/2019    Performed by Hilton Hanson MD at [facility] OR [address] [place] [address]       Review of patient's allergies indicates:  No Known Allergies    PTA Medications   Medication Sig    aspirin (ECOTRIN) 81 MG EC tablet Take 1 tablet (81 mg total) by mouth once daily.    atorvastatin (LIPITOR) 80 MG tablet Take 1 tablet (80 mg total) by mouth once daily.    carvedilol (COREG) 3.125 MG tablet Take 1 tablet (3.125 mg total) by mouth 2 (two) times daily.    clopidogrel (PLAVIX) 75 mg tablet Take 1 tablet (75 mg total) by mouth once daily.    furosemide (LASIX) 40 MG tablet Take 1 tablet (40 mg total) by mouth once daily.    lisinopril (PRINIVIL,ZESTRIL) 2.5 MG tablet Take 1 tablet (2.5 mg total) by mouth once daily.    mirtazapine (REMERON) 15 MG tablet Take 15 mg by mouth every evening.      polyethylene glycol (GLYCOLAX) 17 gram PwPk Take 17 g by mouth 2 (two) times daily as needed (constipation).    tamsulosin (FLOMAX) 0.4 mg Cap Take 1 capsule (0.4 mg total) by mouth once daily.    tiotropium (SPIRIVA) 18 mcg inhalation capsule Inhale 1 capsule (18 mcg total) into the lungs once daily. Controller     Family History     Problem Relation (Age of Onset)    No Known Problems Mother, Father        Tobacco Use    Smoking status: Current Every Day Smoker     Packs/day: 1.00     Years: 53.00     Pack years: 53.00     Types: Cigarettes    Smokeless tobacco: Never Used    Substance and Sexual Activity    Alcohol use: Not on file    Drug use: Not on file    Sexual activity: Not on file     Review of Systems   Unable to perform ROS: mental status change     Objective:     Vital Signs (Most Recent):  Temp: 97.7 °F (36.5 °C) (07/10/19 1100)  Pulse: 92 (07/10/19 1415)  Resp: (!) 22 (07/10/19 1415)  BP: (!) 102/56 (07/10/19 1400)  SpO2: 96 % (07/10/19 1415) Vital Signs (24h Range):  Temp:  [97.7 °F (36.5 °C)-98.7 °F (37.1 °C)] 97.7 °F (36.5 °C)  Pulse:  [] 92  Resp:  [8-32] 22  SpO2:  [83 %-100 %] 96 %  BP: ()/(51-88) 102/56  Arterial Line BP: ()/() 92/45     Weight: 62 kg (136 lb 11 oz)  Body mass index is 21.41 kg/m².    SpO2: 96 %  O2 Device (Oxygen Therapy): room air      Intake/Output Summary (Last 24 hours) at 7/10/2019 1457  Last data filed at 7/10/2019 1400  Gross per 24 hour   Intake 1130.2 ml   Output 3015 ml   Net -1884.8 ml       Lines/Drains/Airways     Drain                 Urethral Catheter 07/07/19 1610 Non-latex 16 Fr. 2 days          Arterial Line                 Arterial Line 07/08/19 0125 Left Radial 2 days          Peripheral Intravenous Line                 Peripheral IV - Single Lumen 07/07/19 1500 18 G Left Forearm 2 days         Peripheral IV - Single Lumen 07/08/19 0029 20 G Right Forearm 2 days                Physical Exam   Constitutional: No distress.   Frail and not able to respond to questions    HENT:   Head: Normocephalic.   Eyes: Right eye exhibits no discharge. Left eye exhibits no discharge.   Neck: Normal range of motion. No thyromegaly present.   Cardiovascular: Normal rate.   Murmur heard.  Pulmonary/Chest: He has no wheezes. He has rales.   Abdominal: Soft. He exhibits no distension.   Skin: Skin is warm. No erythema.       Significant Labs:   CMP   Recent Labs   Lab 07/09/19  0355 07/09/19  1458 07/10/19  0141 07/10/19  0440    135* 131* 132*   K 4.3 4.0 3.6 3.5   CL 99 98 94* 95   CO2 26 27 26 27   GLU 84 92 70 95    BUN 21 23 26* 27*   CREATININE 1.4 1.4 1.4 1.4   CALCIUM 8.6* 9.3 9.1 8.8   PROT 6.4 6.3  --  6.2   ALBUMIN 2.4* 2.2*  --  2.2*   BILITOT 0.9 0.9  --  0.7   ALKPHOS 68 62  --  62   AST 23 15  --  35   ALT 7* 5*  --  7*   ANIONGAP 12 10 11 10   ESTGFRAFRICA >60.0 >60.0 >60.0 >60.0   EGFRNONAA 53.1* 53.1* 52.7* 52.7*    and CBC   Recent Labs   Lab 07/09/19  0251 07/09/19  1458 07/10/19  0440   WBC 16.14* 17.49* 15.01*   HGB 12.0* 12.1* 11.3*   HCT 37.0* 36.9* 34.6*   PLT 92* 106* 113*       Significant Imaging: Cardiac Cath: see above,    Assessment and Plan:     NSTEMI (non-ST elevated myocardial infarction)  - High suspicious is Type II NSTEMI (demand ischemia) especially in the setting of having hypotensive episode and provoked by underlying extensive coronary artery disease   - EVAR extended through common iliac arteries   - His troponin is improving (peaked at 0.09), ECG was consistent with ischemia changes on the episode of Hypotension, however, after that T wave is improving on repeat ECG   - High concern for cardiogenic etiology of his shock (depressed EF and ACS)  - Received medical therapy with DAPT, heparin infusion  - We recommend Right IJ Central Line placement to measure his cardiac output (CO) and Cardiac Index (CI)  - If he is on shock state (MAP < 60 mmHg), please call interventional cardiology for consideration of Mechanical Circulatory Support (although extremely difficult in the setting of fresh EVAR)   - Discussed with interventional cardiology staff and primary team     VTE Risk Mitigation (From admission, onward)        Ordered     heparin (porcine) injection 5,000 Units  Every 8 hours      07/10/19 0959     IP VTE HIGH RISK PATIENT  Once      07/07/19 2330        Thank you for your consult. I will follow-up with patient. Please contact us if you have any additional questions.    Cheikh Garland MD  Interventional Cardiology   [facility]

## 2019-07-10 NOTE — ASSESSMENT & PLAN NOTE
- High suspicious is Type II NSTEMI (demand ischemia) especially in the setting of having hypotensive episode and provoked by underlying extensive coronary artery disease   - His troponin is improving (peaked at 0.09), ECG was consistent with ischemia changes on the episode of Hypotension, however, after that T wave is improving on repeat ECG   - Received medical therapy with DAPT, almost 36 hours of heparin infusion   - Extremely frail and hypotensive on examination, no family bedside and concerns about safty of consenting him for [facility], no family bedside or on contacts info for consent.   - Will re-visit him to discuss more the treatment options   - Will discuss with Interventional Cardiology staff.

## 2019-07-10 NOTE — PROGRESS NOTES
[facility]  Vascular Surgery  Progress Note    Patient Name: Yogesh Lima  MRN: [id number]  Admission Date: 7/7/2019  Primary Care Provider: [facility]    Subjective:     Interval History: Extubated yesterday, increasing agitated and delirious     Post-Op Info:  Procedure(s) (LRB):  REPAIR-ANEURYSM-ABDOMINAL AORTIC-ENDOVASCULAR (AAA) (Bilateral)   3 Days Post-Op       Medications:  Continuous Infusions:   dexmedetomidine (PRECEDEX) infusion Stopped (07/10/19 1500)    DOBUTamine Stopped (07/10/19 1100)     Scheduled Meds:   albuterol-ipratropium  3 mL Nebulization Q4H    aspirin  81 mg Per OG tube Daily    atorvastatin  80 mg Per OG tube Daily    ceFEPime (MAXIPIME) IVPB  1 g Intravenous Q12H    clopidogrel  75 mg Oral Daily    heparin (porcine)  5,000 Units Subcutaneous Q8H    mupirocin  1 g Nasal BID    nicotine  1 patch Transdermal Daily    tamsulosin  0.4 mg Oral Daily     PRN Meds:acetaminophen, Dextrose 10% Bolus, Dextrose 10% Bolus, fentaNYL, hydrALAZINE, HYDROcodone-acetaminophen, HYDROcodone-acetaminophen, magnesium sulfate IVPB, magnesium sulfate IVPB, potassium chloride in water **AND** potassium chloride in water **AND** potassium chloride in water     Objective:     Vital Signs (Most Recent):  Temp: 97.8 °F (36.6 °C) (07/10/19 1500)  Pulse: 86 (07/10/19 1645)  Resp: (!) 22 (07/10/19 1645)  BP: (!) 98/56 (07/10/19 1600)  SpO2: 98 % (07/10/19 1645) Vital Signs (24h Range):  Temp:  [97.7 °F (36.5 °C)-98.7 °F (37.1 °C)] 97.8 °F (36.6 °C)  Pulse:  [] 86  Resp:  [8-31] 22  SpO2:  [83 %-100 %] 98 %  BP: ()/(52-75) 98/56  Arterial Line BP: ()/() 102/51     Date 07/10/19 0700 - 07/11/19 0659   Shift 2138-6388 3487-9048 8465-8635 24 Hour Total   INTAKE   Shift Total(mL/kg)       OUTPUT   Urine(mL/kg/hr) 1585(3.2) 60  1645   Shift Total(mL/kg) 1585(25.6) 60(1)  1645(26.5)   Weight (kg) 62 62 62 62       Physical Exam   Constitutional: He  appears lethargic. He appears cachectic.   Disheveled    HENT:   Head: Normocephalic and atraumatic.   Nose: Nose normal.   Eyes: Pupils are equal, round, and reactive to light. Conjunctivae and EOM are normal. No scleral icterus.   Neck: Normal range of motion. No thyromegaly present.   Cardiovascular: Normal rate and regular rhythm. Exam reveals no gallop and no friction rub.   No murmur heard.  Pulses:       Radial pulses are 2+ on the right side, and 2+ on the left side.        Femoral pulses are 2+ on the right side, and 2+ on the left side.  PTs dopplerable in bilateral legs  DP with weak monophasic signal in right leg, non-dopplerable in left   Pulmonary/Chest:   On nasal cannula    Abdominal: Soft. He exhibits no distension. Mass:    There is no tenderness.   Genitourinary:   Genitourinary Comments: Bilateral groin sites c/d/i; no evidence of hematoma    Musculoskeletal: Normal range of motion.   Lymphadenopathy:     He has no cervical adenopathy.   Neurological: He appears lethargic. He is disoriented.   Skin: Skin is warm and dry. No rash noted.       Significant Labs:  CBC:   Recent Labs   Lab 07/10/19  0440   WBC 15.01*   RBC 4.36*   HGB 11.3*   HCT 34.6*   *   MCV 79*   MCH 25.9*   MCHC 32.7     CMP:   Recent Labs   Lab 07/10/19  0440   GLU 95   CALCIUM 8.8   ALBUMIN 2.2*   PROT 6.2   *   K 3.5   CO2 27   CL 95   BUN 27*   CREATININE 1.4   ALKPHOS 62   ALT 7*   AST 35   BILITOT 0.7       Significant Diagnostics:  I have reviewed all pertinent imaging results/findings within the past 24 hours.    Assessment/Plan:     Aneurysm of infrarenal abdominal aorta  64yo M with multiple medical co-morbidities who presented as transfer from OSH given symptomatic, enlarging AAA now s/p emergent EVAR on 7/7    Neuro: On precedex this morning for agitation, continue to monitor neuro status, may be withdrawaling - consider librium taper  Resp: Extubated yesterday, maintaining sats, wean O2 as tolerated,  duonebs q4 in setting of known COPD, ABGs prn, continuous pulse ox  CV: TTE with evidence of CHF, EF 25%, BNP significantly elevated, hypotensive during the day improved with dobutamine, off this afternoon. Cards consulted, appreciate recs. Continue daily ASA, statin; SBP < 140, HR < 100;   FEN/GI: NPO for now until passes speech/swallow,replete lytes   Renal: 40 lasix BID today, monitor response, monitor BUN/Cr, maintain arreola for accurate I/Os for now - has previously had issues with retention but can consider d/c today when more alert  Heme/ID: H/H stable, WBC 15 today from 17, pan cultured yesterday, resp cultures with moraxella - consult ID, continue broad antibiotics for now  Endo: POCT blood glucose, no current issues, continue to monitor  Msk: No issues, continue to monitor    Ppx: SQH  Dispo: Continue ICU care, PT/OT          Carmen Ng MD  Vascular Surgery  [facility]

## 2019-07-10 NOTE — PLAN OF CARE
"Problem: Adult Inpatient Plan of Care  Goal: Plan of Care Review  Outcome: Ongoing (interventions implemented as appropriate)  Dx: AAA (abdominal aortic aneurysm) without rupture    Shift Events: Dobutamine off. Combative, restless, and confused throughout shift. Restraints in place and precedex infusing. PRN haldol ordered. Interventional cards consult.     Neuro: Follows Commands, Moves All Extremities and Confused    Vital Signs: /68 (BP Location: Left arm, Patient Position: Lying)   Pulse 107   Temp 97.8 °F (36.6 °C) (Oral)   Resp (!) 23   Ht 5' 7" (1.702 m)   Wt 62 kg (136 lb 11 oz)   SpO2 97%   BMI 21.41 kg/m²     Diet: Clear Liquid    Gtts: Precedex    Urine Output: Urinary Catheter removed at 1800. Due to void.     Restraints : bilateral wrist restraints in place. No injuries noted. Will remove when appropriate.     Labs/Accuchecks: POCT Q4H.    Skin: See Epic flowsheets.          "

## 2019-07-10 NOTE — SUBJECTIVE & OBJECTIVE
Medications:  Continuous Infusions:   dexmedetomidine (PRECEDEX) infusion Stopped (07/10/19 1500)    DOBUTamine Stopped (07/10/19 1100)     Scheduled Meds:   albuterol-ipratropium  3 mL Nebulization Q4H    aspirin  81 mg Per OG tube Daily    atorvastatin  80 mg Per OG tube Daily    ceFEPime (MAXIPIME) IVPB  1 g Intravenous Q12H    clopidogrel  75 mg Oral Daily    heparin (porcine)  5,000 Units Subcutaneous Q8H    mupirocin  1 g Nasal BID    nicotine  1 patch Transdermal Daily    tamsulosin  0.4 mg Oral Daily     PRN Meds:acetaminophen, Dextrose 10% Bolus, Dextrose 10% Bolus, fentaNYL, hydrALAZINE, HYDROcodone-acetaminophen, HYDROcodone-acetaminophen, magnesium sulfate IVPB, magnesium sulfate IVPB, potassium chloride in water **AND** potassium chloride in water **AND** potassium chloride in water     Objective:     Vital Signs (Most Recent):  Temp: 97.8 °F (36.6 °C) (07/10/19 1500)  Pulse: 86 (07/10/19 1645)  Resp: (!) 22 (07/10/19 1645)  BP: (!) 98/56 (07/10/19 1600)  SpO2: 98 % (07/10/19 1645) Vital Signs (24h Range):  Temp:  [97.7 °F (36.5 °C)-98.7 °F (37.1 °C)] 97.8 °F (36.6 °C)  Pulse:  [] 86  Resp:  [8-31] 22  SpO2:  [83 %-100 %] 98 %  BP: ()/(52-75) 98/56  Arterial Line BP: ()/() 102/51     Date 07/10/19 0700 - 07/11/19 0659   Shift 4596-5346 4793-4651 4022-7058 24 Hour Total   INTAKE   Shift Total(mL/kg)       OUTPUT   Urine(mL/kg/hr) 1585(3.2) 60  1645   Shift Total(mL/kg) 1585(25.6) 60(1)  1645(26.5)   Weight (kg) 62 62 62 62       Physical Exam   Constitutional: He appears lethargic. He appears cachectic.   Disheveled    HENT:   Head: Normocephalic and atraumatic.   Nose: Nose normal.   Eyes: Pupils are equal, round, and reactive to light. Conjunctivae and EOM are normal. No scleral icterus.   Neck: Normal range of motion. No thyromegaly present.   Cardiovascular: Normal rate and regular rhythm. Exam reveals no gallop and no friction rub.   No murmur heard.  Pulses:        Radial pulses are 2+ on the right side, and 2+ on the left side.        Femoral pulses are 2+ on the right side, and 2+ on the left side.  PTs dopplerable in bilateral legs  DP with weak monophasic signal in right leg, non-dopplerable in left   Pulmonary/Chest:   On nasal cannula    Abdominal: Soft. He exhibits no distension. Mass:    There is no tenderness.   Genitourinary:   Genitourinary Comments: Bilateral groin sites c/d/i; no evidence of hematoma    Musculoskeletal: Normal range of motion.   Lymphadenopathy:     He has no cervical adenopathy.   Neurological: He appears lethargic. He is disoriented.   Skin: Skin is warm and dry. No rash noted.       Significant Labs:  CBC:   Recent Labs   Lab 07/10/19  0440   WBC 15.01*   RBC 4.36*   HGB 11.3*   HCT 34.6*   *   MCV 79*   MCH 25.9*   MCHC 32.7     CMP:   Recent Labs   Lab 07/10/19  0440   GLU 95   CALCIUM 8.8   ALBUMIN 2.2*   PROT 6.2   *   K 3.5   CO2 27   CL 95   BUN 27*   CREATININE 1.4   ALKPHOS 62   ALT 7*   AST 35   BILITOT 0.7       Significant Diagnostics:  I have reviewed all pertinent imaging results/findings within the past 24 hours.

## 2019-07-11 PROBLEM — J15.69 MORAXELLA CATARRHALIS PNEUMONIA: Status: ACTIVE | Noted: 2019-01-01

## 2019-07-11 NOTE — SUBJECTIVE & OBJECTIVE
Medications:  Continuous Infusions:   dexmedetomidine (PRECEDEX) infusion 0.8 mcg/kg/hr (07/11/19 0600)    DOBUTamine Stopped (07/11/19 0500)     Scheduled Meds:   albuterol-ipratropium  3 mL Nebulization Q4H    aspirin  81 mg Per OG tube Daily    atorvastatin  80 mg Per OG tube Daily    carvedilol  3.125 mg Oral BID    ceFEPime (MAXIPIME) IVPB  1 g Intravenous Q12H    chlordiazepoxide  15 mg Oral QID    Followed by    chlordiazepoxide  10 mg Oral QID    Followed by    [START ON 7/12/2019] chlordiazepoxide  10 mg Oral TID    Followed by    [START ON 7/14/2019] chlordiazepoxide  5 mg Oral TID    Followed by    [START ON 7/15/2019] chlordiazepoxide  5 mg Oral BID    Followed by    [START ON 7/16/2019] chlordiazepoxide  5 mg Oral Once    clopidogrel  75 mg Oral Daily    folic acid  1 mg Oral Daily    heparin (porcine)  5,000 Units Subcutaneous Q8H    multivitamin  1 tablet Oral Daily    mupirocin  1 g Nasal BID    nicotine  1 patch Transdermal Daily    tamsulosin  0.4 mg Oral Daily    thiamine  100 mg Oral Daily     PRN Meds:acetaminophen, Dextrose 10% Bolus, Dextrose 10% Bolus, fentaNYL, haloperidol lactate, hydrALAZINE, HYDROcodone-acetaminophen, HYDROcodone-acetaminophen, magnesium sulfate IVPB, magnesium sulfate IVPB, potassium chloride in water **AND** potassium chloride in water **AND** potassium chloride in water     Objective:     Vital Signs (Most Recent):  Temp: 98 °F (36.7 °C) (07/10/19 2300)  Pulse: 82 (07/11/19 0700)  Resp: (!) 21 (07/11/19 0700)  BP: (!) 100/55 (07/11/19 0700)  SpO2: 98 % (07/11/19 0700) Vital Signs (24h Range):  Temp:  [97.7 °F (36.5 °C)-98 °F (36.7 °C)] 98 °F (36.7 °C)  Pulse:  [] 82  Resp:  [8-35] 21  SpO2:  [91 %-100 %] 98 %  BP: ()/(41-77) 100/55  Arterial Line BP: ()/() 109/53         Physical Exam   Constitutional: He appears lethargic. He appears cachectic.   Disheveled    HENT:   Head: Normocephalic and atraumatic.   Nose: Nose  normal.   Eyes: Pupils are equal, round, and reactive to light. Conjunctivae and EOM are normal. No scleral icterus.   Neck: Normal range of motion. No thyromegaly present.   Cardiovascular: Normal rate and regular rhythm. Exam reveals no gallop and no friction rub.   No murmur heard.  Pulses:       Radial pulses are 2+ on the right side, and 2+ on the left side.        Femoral pulses are 2+ on the right side, and 2+ on the left side.  PTs dopplerable in bilateral legs  DP with weak monophasic signal in right leg, non-dopplerable in left   Pulmonary/Chest:   On nasal cannula    Abdominal: Soft. He exhibits no distension. Mass:    There is no tenderness.   Genitourinary:   Genitourinary Comments: Bilateral groin sites c/d/i; no evidence of hematoma    Musculoskeletal: Normal range of motion.   Lymphadenopathy:     He has no cervical adenopathy.   Neurological: He appears lethargic. He is disoriented.   Skin: Skin is warm and dry. No rash noted.       Significant Labs:  CBC:   Recent Labs   Lab 07/11/19  0300   WBC 9.44   RBC 4.15*   HGB 10.7*   HCT 33.6*   *   MCV 81*   MCH 25.8*   MCHC 31.8*     CMP:   Recent Labs   Lab 07/11/19  0300   GLU 98   CALCIUM 8.8   ALBUMIN 2.1*   PROT 6.0   *   K 4.0   CO2 26   CL 98   BUN 25*   CREATININE 1.2   ALKPHOS 65   ALT 10   AST 38   BILITOT 0.5       Significant Diagnostics:  I have reviewed all pertinent imaging results/findings within the past 24 hours.

## 2019-07-11 NOTE — PROGRESS NOTES
Notified Dr. Choudhury with SICU of pt's BP 80s/40s, MAP < 60. OK'd to restart dobutamine gtt at this time. Precedex gtt off. Will obtain ABG at this time.

## 2019-07-11 NOTE — SUBJECTIVE & OBJECTIVE
Review of Systems   Constitution: Negative. Negative for chills and fever.   HENT: Negative.    Eyes: Negative.    Cardiovascular: Negative for chest pain, claudication and paroxysmal nocturnal dyspnea.   Respiratory: Negative for cough, shortness of breath and wheezing.    Endocrine: Negative.    Hematologic/Lymphatic: Does not bruise/bleed easily.   Skin: Negative for nail changes and rash.   Musculoskeletal: Negative.  Negative for back pain.   Gastrointestinal: Negative for abdominal pain, melena, nausea and vomiting.   Neurological: Negative for dizziness and headaches.   Psychiatric/Behavioral: Positive for altered mental status. Negative for depression and substance abuse.   Allergic/Immunologic: Negative.      Objective:     Vital Signs (Most Recent):  Temp: 98.2 °F (36.8 °C) (07/11/19 1515)  Pulse: 83 (07/11/19 1430)  Resp: (!) 23 (07/11/19 1430)  BP: 117/60 (07/11/19 1430)  SpO2: 96 % (07/11/19 1430) Vital Signs (24h Range):  Temp:  [97.7 °F (36.5 °C)-98.2 °F (36.8 °C)] 98.2 °F (36.8 °C)  Pulse:  [] 83  Resp:  [11-41] 23  SpO2:  [91 %-100 %] 96 %  BP: ()/(41-80) 117/60  Arterial Line BP: ()/() 116/49     Weight: 61.5 kg (135 lb 9.3 oz)  Body mass index is 21.24 kg/m².     SpO2: 96 %  O2 Device (Oxygen Therapy): nasal cannula      Intake/Output Summary (Last 24 hours) at 7/11/2019 1605  Last data filed at 7/11/2019 1300  Gross per 24 hour   Intake 550 ml   Output 625 ml   Net -75 ml       Lines/Drains/Airways     Peripheral Intravenous Line                 Peripheral IV - Single Lumen 07/07/19 1500 18 G Left Forearm 4 days         Peripheral IV - Single Lumen 07/08/19 0029 20 G Right Forearm 3 days                Physical Exam   Constitutional: He is oriented to person, place, and time. He appears well-developed and well-nourished.   HENT:   Head: Normocephalic and atraumatic.   Eyes: Pupils are equal, round, and reactive to light. Conjunctivae and EOM are normal.   Neck: No  JVD present.   Cardiovascular: Normal rate, regular rhythm, normal heart sounds and intact distal pulses. Exam reveals no gallop and no friction rub.   No murmur heard.  Pulmonary/Chest: Effort normal. No stridor. He has no wheezes. He has no rales. He exhibits no tenderness.   Abdominal: Soft. Bowel sounds are normal. He exhibits no distension and no mass. There is no tenderness. There is no guarding.   Musculoskeletal: He exhibits no edema, tenderness or deformity.   Neurological: He is alert and oriented to person, place, and time.   Skin: Skin is warm and dry. No rash noted. No erythema.   Psychiatric: He has a normal mood and affect.   Agitated, but responsive to questions, directable       Significant Labs:   CMP   Recent Labs   Lab 07/10/19  0440 07/10/19  1627 07/11/19  0300   * 128* 134*   K 3.5 3.6 4.0   CL 95 94* 98   CO2 27 25 26   GLU 95 81 98   BUN 27* 24* 25*   CREATININE 1.4 1.2 1.2   CALCIUM 8.8 8.8 8.8   PROT 6.2 5.9* 6.0   ALBUMIN 2.2* 2.1* 2.1*   BILITOT 0.7 0.7 0.5   ALKPHOS 62 61 65   AST 35 38 38   ALT 7* 9* 10   ANIONGAP 10 9 10   ESTGFRAFRICA >60.0 >60.0 >60.0   EGFRNONAA 52.7* >60.0 >60.0    and CBC   Recent Labs   Lab 07/10/19  0440 07/11/19  0300   WBC 15.01* 9.44   HGB 11.3* 10.7*   HCT 34.6* 33.6*   * 105*       Significant Imaging: Echocardiogram:   Transthoracic echo (TTE) complete (Cupid Only):   Results for orders placed or performed during the hospital encounter of 07/07/19   Transthoracic echo (TTE) 2D with Color Flow   Result Value Ref Range    Ascending aorta 3.07 cm    STJ 3.14 cm    AV mean gradient 2 mmHg    Ao peak aaliyah 0.90 m/s    Ao VTI 12.22 cm    IVS 1.01 0.6 - 1.1 cm    LA size 4.50 cm    Left Atrium Major Axis 5.88 cm    Left Atrium Minor Axis 5.21 cm    LVIDD 5.76 3.5 - 6.0 cm    LVIDS 4.88 (A) 2.1 - 4.0 cm    LVOT diameter 2.22 cm    LVOT peak VTI 10.53 cm    PW 0.62 0.6 - 1.1 cm    RA Major Axis 5.72 cm    RA Width 3.28 cm    RVDD 2.62 cm    Sinus 3.18  cm    TAPSE 1.33 cm    TDI LATERAL 0.17 m/s    TDI SEPTAL 0.09 m/s    LA WIDTH 4.05 cm    LV Diastolic Volume 164.01 mL    LV Systolic Volume 111.91 mL    LVOT peak aaliyah 0.74 m/s    FS 15 %    LA volume 85.59 cm3    LV mass 177.38 g    Left Ventricle Relative Wall Thickness 0.22 cm    AV valve area 3.33 cm2    AV Velocity Ratio 0.82     AV index (prosthetic) 0.86     Mean e' 0.13 m/s    LVOT area 3.9 cm2    LVOT stroke volume 40.74 cm3    AV peak gradient 3 mmHg    LV Systolic Volume Index 64.8 mL/m2    LV Diastolic Volume Index 94.97 mL/m2    LA Volume Index 49.6 mL/m2    LV Mass Index 103 g/m2    BSA 1.72 m2    Narrative    · Severely decreased left ventricular systolic function with evidence of   basal inferior akinesis. The estimated ejection fraction is 25%.  · Mildly to moderately reduced right ventricular systolic function.  · Left ventricular diastolic dysfunction.  · Severe left atrial enlargement.  · Mild mitral regurgitation.  · Mechanically ventilated; cannot use inferior caval vein diameter to   estimate central venous pressure.        Review of Systems   Constitutional: Negative.  Negative for chills and fever.   HENT: Negative.    Eyes: Negative.    Respiratory: Negative for cough, shortness of breath and wheezing.    Cardiovascular: Negative for chest pain, claudication and PND.   Gastrointestinal: Negative for abdominal pain, melena, nausea and vomiting.   Endocrine: Negative.    Musculoskeletal: Negative.  Negative for back pain.   Skin: Negative for nail changes and rash.   Allergic/Immunologic: Negative.    Neurological: Negative for dizziness and headaches.   Hematological: Does not bruise/bleed easily.   Psychiatric/Behavioral: Negative for depression and substance abuse.       Physical Exam   Constitutional: He is oriented to person, place, and time. He appears well-developed and well-nourished.   HENT:   Head: Normocephalic and atraumatic.   Eyes: Pupils are equal, round, and reactive to  light. Conjunctivae and EOM are normal.   Neck: No JVD present.   Cardiovascular: Normal rate, regular rhythm, normal heart sounds and intact distal pulses. Exam reveals no gallop and no friction rub.   No murmur heard.  Pulmonary/Chest: Effort normal. No stridor. He has no wheezes. He has no rales. He exhibits no tenderness.   Abdominal: Soft. Bowel sounds are normal. He exhibits no distension and no mass. There is no tenderness. There is no guarding.   Musculoskeletal: He exhibits no edema, tenderness or deformity.   Neurological: He is alert and oriented to person, place, and time.   Skin: Skin is warm and dry. No rash noted. No erythema.   Psychiatric: He has a normal mood and affect.   Agitated, but responsive to questions, directable

## 2019-07-11 NOTE — ASSESSMENT & PLAN NOTE
63-year-old male with acute on chronic CHF exacerbation found to have a AAA enlarged to greater than 6cm from previous now s/p EVAR.     Neuro:  -Weaned off of propofol and fentanyl  -started on nicotine patch  -Needed haldol, ativan and precedex for increased agitation 7/10    Cards:  Currently with CHF exacerbation.  -SVT overnight. EKG showed abnormal ST segments and inverted t waves   -started on heparin per ACS protocol  -Diuresis started 7/8. Will continue w/  -Echo 7/8: 25% EF  -keep systolics less than 140 and HR normal  -weaned off dobutamine 7/10    Resp:  -Extubated and on room air    FENGI  -NPO   -replace lytes as needed  -GI ppx    Heme:  CBC stable post op. Doing well.   -trend H/H  -DVT ppx    Renal:  Baseline CKD with admitting creatinine of 1.4.   -trend renal function  -continue diuresis as kidneys tolerate    Endo:  Blood sugars stable. No history of diabetes.   -monitor blood sugar.     ID:  -Continue prophylactic abx for 2 doses post op  -d/c vanc and continue cefepime. F/u cultures  -trend white count    Dispo: Plan for step-down soon

## 2019-07-11 NOTE — CONSULTS
[facility]  Infectious Disease  Consult Note    Patient Name: Yogesh Lima  MRN: [id number]  Admission Date: 7/7/2019  Hospital Length of Stay: 4 days  Attending Physician: Hilton Hanson MD  Primary Care Provider: [facility]     Isolation Status: No active isolations    Patient information was obtained from patient, past medical records and ER records.      Inpatient consult to Infectious Diseases  Consult performed by: Muna Borrero MD  Consult ordered by: Carmen Ng MD  Reason for consult: Antibiotics recs for PNA        Assessment/Plan:     Moraxella catarrhalis pneumonia  63-year-old male with history of HTN, HLD, current smoker, CAD s/p multi-vessel CABG (1993), heart failure, COPD, and known AAA (discovered April 2019)  who presented as transfer from OSH given symptomatic enlarging AAA. He presented to OSH this evening given abdominal pain of several days, worsening in nature, worst in LLQ and radiating to his back. He also was complaining of bilateral lower extremity edema, shortness of breath and dyspnea on exertion. BNP was 16,200. CT abdomen was performed given abdominal pain and history of AAA and his aneurysm was noted to be enlarged to 6.1cm from previous diameter of 4.8cm. Given this [place] [facility] [place]. He's now s/p EVAR and rextubated off pressors.     ID consulted for: Antibiotics recs for PNA    Patient has cough with hypotension episodes he is on NC 2 L maintain O2: 98% . CTA abdomen and pelvis 07/07: Bilateral basilar atelectatic versus consolidative change overlying mild bilateral pleural fluid collections. BAL culture: MORAXELLA (BRANHAMELLA) CATARRHALIS      Antibiotics he was receiving:  Cefazolin 07/07-07/08  Cefepime 07/08-07/10  Vancomycin 07/07-07/09    Recommendations:  - Stop Cefepime and vancomycin  - Start Amoxi-clav 875-125mg BID for 5 days                    Thank you for your consult.  I will sign off. Please contact us if you have any additional questions.    Muna Borrero MD  Infectious Disease  [facility]    Subjective:     Principal Problem: AAA (abdominal aortic aneurysm) without rupture    HPI: 63-year-old male with history of HTN, HLD, current smoker, CAD s/p multi-vessel CABG (1993), heart failure, COPD, and known AAA (discovered April 2019)  who presented as transfer from OSH given symptomatic enlarging AAA. He presented to OSH this evening given abdominal pain of several days, worsening in nature, worst in LLQ and radiating to his back. He also was complaining of bilateral lower extremity edema, shortness of breath and dyspnea on exertion. BNP was 16,200. CT abdomen was performed given abdominal pain and history of AAA and his aneurysm was noted to be enlarged to 6.1cm from previous diameter of 4.8cm. Given this he was [place] [facility] [place] intervention. He's now s/p EVAR and rextubated off pressors.     Patient has cough with hypotension episodes he is on NC 2 L maintain O2: 98% . CTA abdomen and pelvis 07/07: Bilateral basilar atelectatic versus consolidative change overlying mild bilateral pleural fluid collections. BAL culture: MORAXELLA (BRANHAMELLA) CATARRHALIS    ID consulted for: Antibiotics recs for PNA          Review of Systems   Constitution: Negative. Negative for chills and fever.   HENT: Negative.    Eyes: Negative.    Cardiovascular: Negative for chest pain, claudication and paroxysmal nocturnal dyspnea.   Respiratory: Negative for cough, shortness of breath and wheezing.    Endocrine: Negative.    Hematologic/Lymphatic: Does not bruise/bleed easily.   Skin: Negative for nail changes and rash.   Musculoskeletal: Negative.  Negative for back pain.   Gastrointestinal: Negative for abdominal pain, melena, nausea and vomiting.   Neurological: Negative for dizziness and headaches.   Psychiatric/Behavioral: Positive for altered  mental status. Negative for depression and substance abuse.   Allergic/Immunologic: Negative.      Objective:     Vital Signs (Most Recent):  Temp: 98.2 °F (36.8 °C) (07/11/19 1515)  Pulse: 83 (07/11/19 1430)  Resp: (!) 23 (07/11/19 1430)  BP: 117/60 (07/11/19 1430)  SpO2: 96 % (07/11/19 1430) Vital Signs (24h Range):  Temp:  [97.7 °F (36.5 °C)-98.2 °F (36.8 °C)] 98.2 °F (36.8 °C)  Pulse:  [] 83  Resp:  [11-41] 23  SpO2:  [91 %-100 %] 96 %  BP: ()/(41-80) 117/60  Arterial Line BP: ()/() 116/49     Weight: 61.5 kg (135 lb 9.3 oz)  Body mass index is 21.24 kg/m².     SpO2: 96 %  O2 Device (Oxygen Therapy): nasal cannula      Intake/Output Summary (Last 24 hours) at 7/11/2019 1605  Last data filed at 7/11/2019 1300  Gross per 24 hour   Intake 550 ml   Output 625 ml   Net -75 ml       Lines/Drains/Airways     Peripheral Intravenous Line                 Peripheral IV - Single Lumen 07/07/19 1500 18 G Left Forearm 4 days         Peripheral IV - Single Lumen 07/08/19 0029 20 G Right Forearm 3 days                Physical Exam   Constitutional: He is oriented to person, place, and time. He appears well-developed and well-nourished.   HENT:   Head: Normocephalic and atraumatic.   Eyes: Pupils are equal, round, and reactive to light. Conjunctivae and EOM are normal.   Neck: No JVD present.   Cardiovascular: Normal rate, regular rhythm, normal heart sounds and intact distal pulses. Exam reveals no gallop and no friction rub.   No murmur heard.  Pulmonary/Chest: Effort normal. No stridor. He has no wheezes. He has no rales. He exhibits no tenderness.   Abdominal: Soft. Bowel sounds are normal. He exhibits no distension and no mass. There is no tenderness. There is no guarding.   Musculoskeletal: He exhibits no edema, tenderness or deformity.   Neurological: He is alert and oriented to person, place, and time.   Skin: Skin is warm and dry. No rash noted. No erythema.   Psychiatric: He has a normal mood  and affect.   Agitated, but responsive to questions, directable       Significant Labs:   CMP   Recent Labs   Lab 07/10/19  0440 07/10/19  1627 07/11/19  0300   * 128* 134*   K 3.5 3.6 4.0   CL 95 94* 98   CO2 27 25 26   GLU 95 81 98   BUN 27* 24* 25*   CREATININE 1.4 1.2 1.2   CALCIUM 8.8 8.8 8.8   PROT 6.2 5.9* 6.0   ALBUMIN 2.2* 2.1* 2.1*   BILITOT 0.7 0.7 0.5   ALKPHOS 62 61 65   AST 35 38 38   ALT 7* 9* 10   ANIONGAP 10 9 10   ESTGFRAFRICA >60.0 >60.0 >60.0   EGFRNONAA 52.7* >60.0 >60.0    and CBC   Recent Labs   Lab 07/10/19  0440 07/11/19  0300   WBC 15.01* 9.44   HGB 11.3* 10.7*   HCT 34.6* 33.6*   * 105*       Significant Imaging: Echocardiogram:   Transthoracic echo (TTE) complete (Cupid Only):   Results for orders placed or performed during the hospital encounter of 07/07/19   Transthoracic echo (TTE) 2D with Color Flow   Result Value Ref Range    Ascending aorta 3.07 cm    STJ 3.14 cm    AV mean gradient 2 mmHg    Ao peak aaliyah 0.90 m/s    Ao VTI 12.22 cm    IVS 1.01 0.6 - 1.1 cm    LA size 4.50 cm    Left Atrium Major Axis 5.88 cm    Left Atrium Minor Axis 5.21 cm    LVIDD 5.76 3.5 - 6.0 cm    LVIDS 4.88 (A) 2.1 - 4.0 cm    LVOT diameter 2.22 cm    LVOT peak VTI 10.53 cm    PW 0.62 0.6 - 1.1 cm    RA Major Axis 5.72 cm    RA Width 3.28 cm    RVDD 2.62 cm    Sinus 3.18 cm    TAPSE 1.33 cm    TDI LATERAL 0.17 m/s    TDI SEPTAL 0.09 m/s    LA WIDTH 4.05 cm    LV Diastolic Volume 164.01 mL    LV Systolic Volume 111.91 mL    LVOT peak aaliyah 0.74 m/s    FS 15 %    LA volume 85.59 cm3    LV mass 177.38 g    Left Ventricle Relative Wall Thickness 0.22 cm    AV valve area 3.33 cm2    AV Velocity Ratio 0.82     AV index (prosthetic) 0.86     Mean e' 0.13 m/s    LVOT area 3.9 cm2    LVOT stroke volume 40.74 cm3    AV peak gradient 3 mmHg    LV Systolic Volume Index 64.8 mL/m2    LV Diastolic Volume Index 94.97 mL/m2    LA Volume Index 49.6 mL/m2    LV Mass Index 103 g/m2    BSA 1.72 m2    Narrative    ·  Severely decreased left ventricular systolic function with evidence of   basal inferior akinesis. The estimated ejection fraction is 25%.  · Mildly to moderately reduced right ventricular systolic function.  · Left ventricular diastolic dysfunction.  · Severe left atrial enlargement.  · Mild mitral regurgitation.  · Mechanically ventilated; cannot use inferior caval vein diameter to   estimate central venous pressure.        Review of Systems   Constitutional: Negative.  Negative for chills and fever.   HENT: Negative.    Eyes: Negative.    Respiratory: Negative for cough, shortness of breath and wheezing.    Cardiovascular: Negative for chest pain, claudication and PND.   Gastrointestinal: Negative for abdominal pain, melena, nausea and vomiting.   Endocrine: Negative.    Musculoskeletal: Negative.  Negative for back pain.   Skin: Negative for nail changes and rash.   Allergic/Immunologic: Negative.    Neurological: Negative for dizziness and headaches.   Hematological: Does not bruise/bleed easily.   Psychiatric/Behavioral: Negative for depression and substance abuse.       Physical Exam   Constitutional: He is oriented to person, place, and time. He appears well-developed and well-nourished.   HENT:   Head: Normocephalic and atraumatic.   Eyes: Pupils are equal, round, and reactive to light. Conjunctivae and EOM are normal.   Neck: No JVD present.   Cardiovascular: Normal rate, regular rhythm, normal heart sounds and intact distal pulses. Exam reveals no gallop and no friction rub.   No murmur heard.  Pulmonary/Chest: Effort normal. No stridor. He has no wheezes. He has no rales. He exhibits no tenderness.   Abdominal: Soft. Bowel sounds are normal. He exhibits no distension and no mass. There is no tenderness. There is no guarding.   Musculoskeletal: He exhibits no edema, tenderness or deformity.   Neurological: He is alert and oriented to person, place, and time.   Skin: Skin is warm and dry. No rash noted. No  erythema.   Psychiatric: He has a normal mood and affect.   Agitated, but responsive to questions, directable

## 2019-07-11 NOTE — PLAN OF CARE
Problem: Adult Inpatient Plan of Care  Goal: Plan of Care Review  Outcome: Ongoing (interventions implemented as appropriate)  No acute events. Pt remains on 2L NC.   Pt only oriented to self and sometimes place.   Sitter remains at bedside. Needs constant reorientation and educating importance of not trying to get out of bed.  Precedex gtt remains in place for agitation.  Dobutamine gtt remains to maintain SBP < 140 and MAP > 65  Voids per urinal.  Accucheck q4h with no SSI required.

## 2019-07-11 NOTE — PROGRESS NOTES
[facility]  Interventional Cardiology  Progress Note    Patient Name: Yogesh Lima  MRN: [id number]  Admission Date: 7/7/2019  Hospital Length of Stay: 4 days  Code Status: Prior   Attending Physician: Hilton Hanson MD   Primary Care Physician: [facility]  Principal Problem:AAA (abdominal aortic aneurysm) without rupture    Subjective:     Interval History: Overnight, he was having low MAP ~ 50 and received 2.5 dobutamine from around 2000 - 0500 this morning. Currently off. Still not able to communicate and mildly sedated with Precedex     Objective:     Vital Signs (Most Recent):  Temp: 98 °F (36.7 °C) (07/10/19 2300)  Pulse: 83 (07/11/19 0600)  Resp: (!) 22 (07/11/19 0600)  BP: (!) 105/59 (07/11/19 0600)  SpO2: 100 % (07/11/19 0600) Vital Signs (24h Range):  Temp:  [97.7 °F (36.5 °C)-98 °F (36.7 °C)] 98 °F (36.7 °C)  Pulse:  [] 83  Resp:  [8-35] 22  SpO2:  [91 %-100 %] 100 %  BP: ()/(41-77) 105/59  Arterial Line BP: ()/() 109/53     Weight: 61.5 kg (135 lb 9.3 oz)  Body mass index is 21.24 kg/m².    SpO2: 100 %  O2 Device (Oxygen Therapy): nasal cannula      Intake/Output Summary (Last 24 hours) at 7/11/2019 0704  Last data filed at 7/11/2019 0600  Gross per 24 hour   Intake 550 ml   Output 1945 ml   Net -1395 ml       Lines/Drains/Airways       Arterial Line                   Arterial Line 07/08/19 0125 Left Radial 3 days            Peripheral Intravenous Line                   Peripheral IV - Single Lumen 07/07/19 1500 18 G Left Forearm 3 days         Peripheral IV - Single Lumen 07/08/19 0029 20 G Right Forearm 3 days                    Physical Exam   Constitutional: No distress.   Frail and not able to respond to questions    HENT:   Head: Normocephalic.   Eyes: Right eye exhibits no discharge. Left eye exhibits no discharge.   Neck: Normal range of motion. No thyromegaly present.   Cardiovascular: Normal rate.   Murmur (grade systolic murmur  ) heard.  Pulmonary/Chest: He has no wheezes. He has no rales.   Abdominal: Soft. He exhibits no distension.   Skin: Skin is warm. No erythema.       Significant Labs:   ABG: No results for input(s): PH, PCO2, HCO3, POCSATURATED, BE in the last 48 hours., CMP   Recent Labs   Lab 07/10/19  0440 07/10/19  1627 07/11/19  0300   * 128* 134*   K 3.5 3.6 4.0   CL 95 94* 98   CO2 27 25 26   GLU 95 81 98   BUN 27* 24* 25*   CREATININE 1.4 1.2 1.2   CALCIUM 8.8 8.8 8.8   PROT 6.2 5.9* 6.0   ALBUMIN 2.2* 2.1* 2.1*   BILITOT 0.7 0.7 0.5   ALKPHOS 62 61 65   AST 35 38 38   ALT 7* 9* 10   ANIONGAP 10 9 10   ESTGFRAFRICA >60.0 >60.0 >60.0   EGFRNONAA 52.7* >60.0 >60.0   , CBC   Recent Labs   Lab 07/09/19  1458 07/10/19  0440 07/11/19  0300   WBC 17.49* 15.01* 9.44   HGB 12.1* 11.3* 10.7*   HCT 36.9* 34.6* 33.6*   * 113* 105*    and INR No results for input(s): INR, PROTIME in the last 48 hours.    Significant Imaging: EKG: persistant T wave inversion on V2-V4    Assessment and Plan:     Patient is a 64 y.o. male presenting with:    NSTEMI (non-ST elevated myocardial infarction)  - High suspicious is Type II NSTEMI (demand ischemia) especially in the setting of having hypotensive episode and provoked by underlying extensive coronary artery disease   - EVAR extended through common iliac arteries   - His troponin is improving (peaked at 0.09), ECG was consistent with ischemia changes on the episode of Hypotension, however, after that T wave is improving on repeat ECG     Recommendations:   - Continue on Aspirin 81 mg PO daily and Plavix 75 mg PO daily   - We recommend weaning off sedation (Precedex) as it may cause vasodilation and worsening shock   - We recommend discontinuing Dobutamine in the setting of having active ACS (NSTEMI)  - No current plan for [facility] at this moment, defer to later date when more stable   - Discussed with interventional cardiology staff and primary team         VTE Risk Mitigation (From  admission, onward)          Ordered     heparin (porcine) injection 5,000 Units  Every 8 hours      07/10/19 0959     IP VTE HIGH RISK PATIENT  Once      07/07/19 5992            Cheikh Garland MD  Interventional Cardiology  [facility]

## 2019-07-11 NOTE — PROGRESS NOTES
[facility]  Critical Care - Surgery  Progress Note    Patient Name: Yogesh Lima  MRN: [id number]  Admission Date: 7/7/2019  Hospital Length of Stay: 3 days  Code Status: Prior  Attending Provider: Hilton Hanson MD  Primary Care Provider: [facility]   Principal Problem: AAA (abdominal aortic aneurysm) without rupture    Subjective:     Hospital/ICU Course:  63-year-old male with history of HTN, HLD, current smoker, CAD s/p multi-vessel CABG (1993), heart failure, COPD, and known AAA (discovered April 2019)  who presented as transfer from OSH given symptomatic enlarging AAA. He presented to OSH this evening given abdominal pain of several days, worsening in nature, worst in LLQ and radiating to his back. He also was complaining of bilateral lower extremity edema, shortness of breath and dyspnea on exertion. BNP was 16,200. CT abdomen was performed given abdominal pain and history of AAA and his aneurysm was noted to be enlarged to 6.1cm from previous diameter of 4.8cm. Given this he was transferred to [facility] [place] surgery intervention. He's now s/p EVAR and remains intubated off pressors.     He became increasingly agitated the night of 7/9 and the morning of 7/10.. He was given ativan, haldol and eventually restarted on Precedex. He was also put iin wrist restraints. He was weaned off of .      Interval History/Significant Events: Overnight, pt became increasingly agitated requiring ativan and haldol. Was not able to wean . Plan to wean today. No other acute event overnight    Follow-up For: Procedure(s) (LRB):  REPAIR-ANEURYSM-ABDOMINAL AORTIC-ENDOVASCULAR (AAA) (Bilateral)    Post-Operative Day: 3 Days Post-Op    Objective:     Vital Signs (Most Recent):  Temp: 97.8 °F (36.6 °C) (07/10/19 1500)  Pulse: 107 (07/10/19 1815)  Resp: (!) 23 (07/10/19 1815)  BP: 129/68 (07/10/19 1800)  SpO2: 97 % (07/10/19 1800) Vital Signs (24h Range):  Temp:  [97.7 °F  (36.5 °C)-98.7 °F (37.1 °C)] 97.8 °F (36.6 °C)  Pulse:  [] 107  Resp:  [8-31] 23  SpO2:  [90 %-100 %] 97 %  BP: ()/(52-75) 129/68  Arterial Line BP: ()/(43-74) 129/63     Weight: 62 kg (136 lb 11 oz)  Body mass index is 21.41 kg/m².      Intake/Output Summary (Last 24 hours) at 7/10/2019 1951  Last data filed at 7/10/2019 1800  Gross per 24 hour   Intake 1068.2 ml   Output 2925 ml   Net -1856.8 ml       Physical Exam   Constitutional: He is oriented to person, place, and time.   Disheveled   HENT:   Head: Normocephalic and atraumatic.   Cardiovascular: Normal rate and regular rhythm.   Pulmonary/Chest: Effort normal and breath sounds normal.   Abdominal: Soft. Bowel sounds are normal.   Genitourinary:   Genitourinary Comments: Mari catheter in place   Neurological: He is alert and oriented to person, place, and time.   Skin: Skin is warm and dry.   Psychiatric: He has a normal mood and affect. His behavior is normal.   Nursing note and vitals reviewed.      Vents:  Vent Mode: A/C (07/09/19 0927)  Ventilator Initiated: Yes (07/07/19 2318)  Set Rate: 16 bmp (07/09/19 0927)  Vt Set: 380 mL (07/09/19 0927)  PEEP/CPAP: 5 cmH20 (07/09/19 0927)  Oxygen Concentration (%): 100 (07/09/19 2326)  Peak Airway Pressure: 20 cmH2O (07/09/19 0927)  Plateau Pressure: 12 cmH20 (07/09/19 0927)  Total Ve: 3.77 mL (07/09/19 0927)  F/VT Ratio<105 (RSBI): 1461.54 (07/09/19 0927)    Lines/Drains/Airways     Arterial Line                 Arterial Line 07/08/19 0125 Left Radial 2 days          Peripheral Intravenous Line                 Peripheral IV - Single Lumen 07/07/19 1500 18 G Left Forearm 3 days         Peripheral IV - Single Lumen 07/08/19 0029 20 G Right Forearm 2 days                Significant Labs:    CBC/Anemia Profile:  Recent Labs   Lab 07/09/19  0251 07/09/19  1458 07/10/19  0440   WBC 16.14* 17.49* 15.01*   HGB 12.0* 12.1* 11.3*   HCT 37.0* 36.9* 34.6*   PLT 92* 106* 113*   MCV 79* 80* 79*   RDW 16.5*  16.4* 16.0*        Chemistries:  Recent Labs   Lab 07/09/19  1458 07/10/19  0141 07/10/19  0440 07/10/19  1627   * 131* 132* 128*   K 4.0 3.6 3.5 3.6   CL 98 94* 95 94*   CO2 27 26 27 25   BUN 23 26* 27* 24*   CREATININE 1.4 1.4 1.4 1.2   CALCIUM 9.3 9.1 8.8 8.8   ALBUMIN 2.2*  --  2.2* 2.1*   PROT 6.3  --  6.2 5.9*   BILITOT 0.9  --  0.7 0.7   ALKPHOS 62  --  62 61   ALT 5*  --  7* 9*   AST 15  --  35 38   MG 1.9 1.9 1.8 2.1   PHOS 5.0* 3.9 3.3 3.4       ABGs:   Recent Labs   Lab 07/09/19  0327   PH 7.485*   PCO2 33.4*   HCO3 25.1   POCSATURATED 100   BE 2       Significant Imaging:  I have reviewed all pertinent imaging results/findings within the past 24 hours.    Assessment/Plan:     Aneurysm of infrarenal abdominal aorta  63-year-old male with acute on chronic CHF exacerbation found to have a AAA enlarged to greater than 6cm from previous now s/p EVAR.     Neuro:  -Weaned off of propofol and fentanyl  -started on nicotine patch  -Needed haldol, ativan and precedex for increased agitation 7/10    Cards:  Currently with CHF exacerbation.  -SVT overnight. EKG showed abnormal ST segments and inverted t waves   -started on heparin per ACS protocol  -Diuresis started 7/8. Will continue w/  -Echo 7/8: 25% EF  -keep systolics less than 140 and HR normal  -weaned off dobutamine 7/10    Resp:  -Extubated and on room air    FENGI  -NPO   -replace lytes as needed  -GI ppx    Heme:  CBC stable post op. Doing well.   -trend H/H  -DVT ppx    Renal:  Baseline CKD with admitting creatinine of 1.4.   -trend renal function  -continue diuresis as kidneys tolerate    Endo:  Blood sugars stable. No history of diabetes.   -monitor blood sugar.     ID:  -Continue prophylactic abx for 2 doses post op  -d/c vanc and continue cefepime. F/u cultures  -trend white count    Dispo: Plan for step-down soon         Lexis Choudhury MD  Critical Care - Surgery  [facility]

## 2019-07-11 NOTE — PROGRESS NOTES
[facility]  Critical Care - Surgery  Progress Note    Patient Name: Yogesh Lima  MRN: [id number]  Admission Date: 7/7/2019  Hospital Length of Stay: 4 days  Code Status: Prior  Attending Provider: Hilton Hanson MD  Primary Care Provider: [facility]   Principal Problem: AAA (abdominal aortic aneurysm) without rupture    Subjective:     Hospital/ICU Course:  63-year-old male with history of HTN, HLD, current smoker, CAD s/p multi-vessel CABG (1993), heart failure, COPD, and known AAA (discovered April 2019)  who presented as transfer from OSH given symptomatic enlarging AAA. He presented to OSH this evening given abdominal pain of several days, worsening in nature, worst in LLQ and radiating to his back. He also was complaining of bilateral lower extremity edema, shortness of breath and dyspnea on exertion. BNP was 16,200. CT abdomen was performed given abdominal pain and history of AAA and his aneurysm was noted to be enlarged to 6.1cm from previous diameter of 4.8cm. Given this [place] [facility]. He's now s/p EVAR and remains intubated off pressors.     He became increasingly agitated the night of 7/9 and the morning of 7/10.. He was given ativan, haldol and eventually restarted on Precedex. He was also put iin wrist restraints. He was weaned off of .      Interval History/Significant Events: Pt w/ increased agitation overnight. Requiring precedex, haldol and restraints. Started on Librium taper. Otherwise, NAEO. VSS    Follow-up For: Procedure(s) (LRB):  REPAIR-ANEURYSM-ABDOMINAL AORTIC-ENDOVASCULAR (AAA) (Bilateral)    Post-Operative Day: 4 Days Post-Op    Objective:     Vital Signs (Most Recent):  Temp: 98 °F (36.7 °C) (07/10/19 2300)  Pulse: 83 (07/11/19 0600)  Resp: (!) 22 (07/11/19 0600)  BP: (!) 105/59 (07/11/19 0600)  SpO2: 100 % (07/11/19 0600) Vital Signs (24h Range):  Temp:  [97.7 °F (36.5 °C)-98 °F (36.7  °C)] 98 °F (36.7 °C)  Pulse:  [] 83  Resp:  [8-35] 22  SpO2:  [91 %-100 %] 100 %  BP: ()/(41-77) 105/59  Arterial Line BP: ()/() 109/53     Weight: 61.5 kg (135 lb 9.3 oz)  Body mass index is 21.24 kg/m².      Intake/Output Summary (Last 24 hours) at 7/11/2019 0642  Last data filed at 7/11/2019 0200  Gross per 24 hour   Intake 384 ml   Output 2095 ml   Net -1711 ml       Physical Exam   Constitutional: He is oriented to person, place, and time. He appears well-developed and well-nourished.   HENT:   Head: Normocephalic and atraumatic.   Cardiovascular: Normal rate and regular rhythm.   Pulmonary/Chest: Effort normal and breath sounds normal.   Abdominal: Soft. Bowel sounds are normal.   Neurological: He is alert and oriented to person, place, and time.   Skin: Skin is warm and dry.   Psychiatric:   Pt agitated   Nursing note and vitals reviewed.      Vents:  Vent Mode: A/C (07/09/19 0927)  Ventilator Initiated: Yes (07/07/19 2318)  Set Rate: 16 bmp (07/09/19 0927)  Vt Set: 380 mL (07/09/19 0927)  PEEP/CPAP: 5 cmH20 (07/09/19 0927)  Oxygen Concentration (%): 100 (07/09/19 2326)  Peak Airway Pressure: 20 cmH2O (07/09/19 0927)  Plateau Pressure: 12 cmH20 (07/09/19 0927)  Total Ve: 3.77 mL (07/09/19 0927)  F/VT Ratio<105 (RSBI): 1461.54 (07/09/19 0927)    Lines/Drains/Airways     Arterial Line                 Arterial Line 07/08/19 0125 Left Radial 3 days          Peripheral Intravenous Line                 Peripheral IV - Single Lumen 07/07/19 1500 18 G Left Forearm 3 days         Peripheral IV - Single Lumen 07/08/19 0029 20 G Right Forearm 3 days                Significant Labs:    CBC/Anemia Profile:  Recent Labs   Lab 07/09/19  1458 07/10/19  0440 07/11/19  0300   WBC 17.49* 15.01* 9.44   HGB 12.1* 11.3* 10.7*   HCT 36.9* 34.6* 33.6*   * 113* 105*   MCV 80* 79* 81*   RDW 16.4* 16.0* 16.1*        Chemistries:  Recent Labs   Lab 07/10/19  0440 07/10/19  1627 07/11/19  0300   *  128* 134*   K 3.5 3.6 4.0   CL 95 94* 98   CO2 27 25 26   BUN 27* 24* 25*   CREATININE 1.4 1.2 1.2   CALCIUM 8.8 8.8 8.8   ALBUMIN 2.2* 2.1* 2.1*   PROT 6.2 5.9* 6.0   BILITOT 0.7 0.7 0.5   ALKPHOS 62 61 65   ALT 7* 9* 10   AST 35 38 38   MG 1.8 2.1 2.2   PHOS 3.3 3.4 3.2       Significant Imaging:  I have reviewed all pertinent imaging results/findings within the past 24 hours.    Assessment/Plan:     Aneurysm of infrarenal abdominal aorta  63-year-old male with acute on chronic CHF exacerbation found to have a AAA enlarged to greater than 6cm from previous now s/p EVAR.     Neuro:  -Weaned off of propofol and fentanyl  -started on nicotine patch  -Continued to need haldol, ativan and precedex for increased agitation overnight 7/11  -Started on Librium    Cards:  Currently with CHF exacerbation.  -SVT overnight. EKG showed abnormal ST segments and inverted t waves   -started on heparin per ACS protocol  -Diuresis started 7/8. Will continue w/  -Echo 7/8: 25% EF  -keep systolics less than 140 and HR normal  -weaned off dobutamine 7/10. Restarted  7/11 for decreased BPs    Resp:  -Extubated and on room air    FENGI  -NPO   -replace lytes as needed  -GI ppx    Heme:  CBC stable post op. Doing well.   -trend H/H  -DVT ppx    Renal:  Baseline CKD with admitting creatinine of 1.4.   -trend renal function  -continue diuresis as kidneys tolerate    Endo:  Blood sugars stable. No history of diabetes.   -monitor blood sugar.     ID:  -Continue prophylactic abx for 2 doses post op  -d/c vanc and continue cefepime. F/u cultures  -Will d/c cefepime today  -trend white count    Dispo: Plan for step-down soon       Lexis Choudhury MD  Critical Care - Surgery  [facility]

## 2019-07-11 NOTE — PROGRESS NOTES
[facility]  Vascular Surgery  Progress Note    Patient Name: Yogesh Lima  MRN: [id number]  Admission Date: 7/7/2019  Primary Care Provider: [facility]    Subjective:     Interval History: Continued to be intermittently agitated overnight requiring precedex, librium taper started. Otherwise no issues, HDS off . Minimal O2 requirements, UOP adequate.     Post-Op Info:  Procedure(s) (LRB):  REPAIR-ANEURYSM-ABDOMINAL AORTIC-ENDOVASCULAR (AAA) (Bilateral)   4 Days Post-Op       Medications:  Continuous Infusions:   dexmedetomidine (PRECEDEX) infusion 0.8 mcg/kg/hr (07/11/19 0600)    DOBUTamine Stopped (07/11/19 0500)     Scheduled Meds:   albuterol-ipratropium  3 mL Nebulization Q4H    aspirin  81 mg Per OG tube Daily    atorvastatin  80 mg Per OG tube Daily    carvedilol  3.125 mg Oral BID    ceFEPime (MAXIPIME) IVPB  1 g Intravenous Q12H    chlordiazepoxide  15 mg Oral QID    Followed by    chlordiazepoxide  10 mg Oral QID    Followed by    [START ON 7/12/2019] chlordiazepoxide  10 mg Oral TID    Followed by    [START ON 7/14/2019] chlordiazepoxide  5 mg Oral TID    Followed by    [START ON 7/15/2019] chlordiazepoxide  5 mg Oral BID    Followed by    [START ON 7/16/2019] chlordiazepoxide  5 mg Oral Once    clopidogrel  75 mg Oral Daily    folic acid  1 mg Oral Daily    heparin (porcine)  5,000 Units Subcutaneous Q8H    multivitamin  1 tablet Oral Daily    mupirocin  1 g Nasal BID    nicotine  1 patch Transdermal Daily    tamsulosin  0.4 mg Oral Daily    thiamine  100 mg Oral Daily     PRN Meds:acetaminophen, Dextrose 10% Bolus, Dextrose 10% Bolus, fentaNYL, haloperidol lactate, hydrALAZINE, HYDROcodone-acetaminophen, HYDROcodone-acetaminophen, magnesium sulfate IVPB, magnesium sulfate IVPB, potassium chloride in water **AND** potassium chloride in water **AND** potassium chloride in water     Objective:     Vital Signs (Most Recent):  Temp: 98 °F (36.7  °C) (07/10/19 2300)  Pulse: 82 (07/11/19 0700)  Resp: (!) 21 (07/11/19 0700)  BP: (!) 100/55 (07/11/19 0700)  SpO2: 98 % (07/11/19 0700) Vital Signs (24h Range):  Temp:  [97.7 °F (36.5 °C)-98 °F (36.7 °C)] 98 °F (36.7 °C)  Pulse:  [] 82  Resp:  [8-35] 21  SpO2:  [91 %-100 %] 98 %  BP: ()/(41-77) 100/55  Arterial Line BP: ()/() 109/53         Physical Exam   Constitutional: He appears lethargic. He appears cachectic.   Disheveled    HENT:   Head: Normocephalic and atraumatic.   Nose: Nose normal.   Eyes: Pupils are equal, round, and reactive to light. Conjunctivae and EOM are normal. No scleral icterus.   Neck: Normal range of motion. No thyromegaly present.   Cardiovascular: Normal rate and regular rhythm. Exam reveals no gallop and no friction rub.   No murmur heard.  Pulses:       Radial pulses are 2+ on the right side, and 2+ on the left side.        Femoral pulses are 2+ on the right side, and 2+ on the left side.  PTs dopplerable in bilateral legs  DP with weak monophasic signal in right leg, non-dopplerable in left   Pulmonary/Chest:   On nasal cannula    Abdominal: Soft. He exhibits no distension. Mass:    There is no tenderness.   Genitourinary:   Genitourinary Comments: Bilateral groin sites c/d/i; no evidence of hematoma    Musculoskeletal: Normal range of motion.   Lymphadenopathy:     He has no cervical adenopathy.   Neurological: He appears lethargic. He is disoriented.   Skin: Skin is warm and dry. No rash noted.       Significant Labs:  CBC:   Recent Labs   Lab 07/11/19  0300   WBC 9.44   RBC 4.15*   HGB 10.7*   HCT 33.6*   *   MCV 81*   MCH 25.8*   MCHC 31.8*     CMP:   Recent Labs   Lab 07/11/19  0300   GLU 98   CALCIUM 8.8   ALBUMIN 2.1*   PROT 6.0   *   K 4.0   CO2 26   CL 98   BUN 25*   CREATININE 1.2   ALKPHOS 65   ALT 10   AST 38   BILITOT 0.5       Significant Diagnostics:  I have reviewed all pertinent imaging results/findings within the past 24  hours.    Assessment/Plan:     Aneurysm of infrarenal abdominal aorta  62yo M with multiple medical co-morbidities who presented as transfer from OSH given symptomatic, enlarging AAA now s/p emergent EVAR on 7/7    Neuro: On precedex this morning for agitation, continue to monitor neuro status, may be withdrawaling - librium taper started 7/10, wean precedex as tolerated today   Resp: Maintaining sats on nasal cannula, wean O2 as tolerated, duonebs q4 in setting of known COPD, ABGs prn, continuous pulse ox  CV: TTE with evidence of CHF, EF 25%, BNP significantly elevated when checked, has been off dobutamine and stable since yesterday, will start coreg 3.125mg BID per cards recs, follow up additional recs.  Continue daily ASA, statin; SBP < 140, HR < 100;   FEN/GI: NPO for now until passes speech/swallow, then advance diet per SICU team, replete lytes   Renal:Monitor BUN/Cr, continue condom cath for accurate I/Os for now  Heme/ID: H/H stable, WBC 9, pan cultured 7/9, resp cultures with moraxella - consult ID, continue cefepime for now  Endo: POCT blood glucose, no current issues, continue to monitor  Msk: No issues, continue to monitor    Ppx: H  Dispo: Continue ICU care until stable off precedex, then plan for stepdown with lizzie, PT/OT          Carmen Ng MD  Vascular Surgery  [facility]    Vascular Attending  Agree with above    Hilton Hanson MD FACS  Vascular/Endovascular Surgery

## 2019-07-11 NOTE — PHYSICIAN QUERY
PT Name: Yogesh Lima  MR #: [id number]     Physician Query Form - Documentation Clarification      CDS/: Lexis James               Contact information:[email or web address]    This form is a permanent document in the medical record.     Query Date: July 11, 2019    By submitting this query, we are merely seeking further clarification of documentation. Please utilize your independent clinical judgment when addressing the question(s) below.    The Medical record reflects the following:    Supporting Clinical Findings Location in Medical Record   NSTEMI (non-ST elevated myocardial infarction)   unclear etiology, possible 2/2 to demand given hypotension, though ischemia itself may be driving his hypotension. EKG changes (anterolateral TWIs), trop elevation noted.    Vascular progress note 7-9   Cards consulted, appreciate recs. Continue daily ASA, statin; SBP < 140, HR < 100; Troponin 0.09 yesterday,  hep gtt started per ACS protocol, trop 0.05 this AM, likely elevated 2/2 to demand ischemia, can likely d/c hep gtt      Vascular progress note 7-9                                                                            Doctor, Please specify diagnosis or diagnoses associated with above clinical findings.    Please further specify the conflicting documentation of the NSTEMI/Demand diagnosis.    Provider Use Only      [    ]  NSTEMI      [ X  ]  NSTEMI due to demand ischemia        [    ]  Demand ischemia only      [    ]  Other:please specify ____________________                                                                                                                   [  ] Clinically Undetermined

## 2019-07-11 NOTE — SUBJECTIVE & OBJECTIVE
Interval History/Significant Events: Pt w/ increased agitation overnight. Requiring precedex, haldol and restraints. Started on Librium taper. Otherwise, NAEO. VSS    Follow-up For: Procedure(s) (LRB):  REPAIR-ANEURYSM-ABDOMINAL AORTIC-ENDOVASCULAR (AAA) (Bilateral)    Post-Operative Day: 4 Days Post-Op    Objective:     Vital Signs (Most Recent):  Temp: 98 °F (36.7 °C) (07/10/19 2300)  Pulse: 83 (07/11/19 0600)  Resp: (!) 22 (07/11/19 0600)  BP: (!) 105/59 (07/11/19 0600)  SpO2: 100 % (07/11/19 0600) Vital Signs (24h Range):  Temp:  [97.7 °F (36.5 °C)-98 °F (36.7 °C)] 98 °F (36.7 °C)  Pulse:  [] 83  Resp:  [8-35] 22  SpO2:  [91 %-100 %] 100 %  BP: ()/(41-77) 105/59  Arterial Line BP: ()/() 109/53     Weight: 61.5 kg (135 lb 9.3 oz)  Body mass index is 21.24 kg/m².      Intake/Output Summary (Last 24 hours) at 7/11/2019 0642  Last data filed at 7/11/2019 0200  Gross per 24 hour   Intake 384 ml   Output 2095 ml   Net -1711 ml       Physical Exam   Constitutional: He is oriented to person, place, and time. He appears well-developed and well-nourished.   HENT:   Head: Normocephalic and atraumatic.   Cardiovascular: Normal rate and regular rhythm.   Pulmonary/Chest: Effort normal and breath sounds normal.   Abdominal: Soft. Bowel sounds are normal.   Neurological: He is alert and oriented to person, place, and time.   Skin: Skin is warm and dry.   Psychiatric:   Pt agitated   Nursing note and vitals reviewed.      Vents:  Vent Mode: A/C (07/09/19 0927)  Ventilator Initiated: Yes (07/07/19 2318)  Set Rate: 16 bmp (07/09/19 0927)  Vt Set: 380 mL (07/09/19 0927)  PEEP/CPAP: 5 cmH20 (07/09/19 0927)  Oxygen Concentration (%): 100 (07/09/19 2326)  Peak Airway Pressure: 20 cmH2O (07/09/19 0927)  Plateau Pressure: 12 cmH20 (07/09/19 0927)  Total Ve: 3.77 mL (07/09/19 0927)  F/VT Ratio<105 (RSBI): 1461.54 (07/09/19 0927)    Lines/Drains/Airways     Arterial Line                 Arterial Line 07/08/19 0125  Left Radial 3 days          Peripheral Intravenous Line                 Peripheral IV - Single Lumen 07/07/19 1500 18 G Left Forearm 3 days         Peripheral IV - Single Lumen 07/08/19 0029 20 G Right Forearm 3 days                Significant Labs:    CBC/Anemia Profile:  Recent Labs   Lab 07/09/19  1458 07/10/19  0440 07/11/19  0300   WBC 17.49* 15.01* 9.44   HGB 12.1* 11.3* 10.7*   HCT 36.9* 34.6* 33.6*   * 113* 105*   MCV 80* 79* 81*   RDW 16.4* 16.0* 16.1*        Chemistries:  Recent Labs   Lab 07/10/19  0440 07/10/19  1627 07/11/19  0300   * 128* 134*   K 3.5 3.6 4.0   CL 95 94* 98   CO2 27 25 26   BUN 27* 24* 25*   CREATININE 1.4 1.2 1.2   CALCIUM 8.8 8.8 8.8   ALBUMIN 2.2* 2.1* 2.1*   PROT 6.2 5.9* 6.0   BILITOT 0.7 0.7 0.5   ALKPHOS 62 61 65   ALT 7* 9* 10   AST 35 38 38   MG 1.8 2.1 2.2   PHOS 3.3 3.4 3.2       Significant Imaging:  I have reviewed all pertinent imaging results/findings within the past 24 hours.

## 2019-07-11 NOTE — SUBJECTIVE & OBJECTIVE
Interval History: Felt to be poor [facility] candidate given not likely in cardiogenic shock, altered sensorium. Worsening altered mental status concerning for EtOH withdrawal for which started on haldol, librium and ativan pushes. This AM patient had transient hypotension, reportedly to MAPs in 40s for which restarted on dobutamine. Denies chest pain, shortness of breath, palpitations.      Review of Systems   Constitution: Negative. Negative for chills and fever.   HENT: Negative.    Eyes: Negative.    Cardiovascular: Negative for chest pain, claudication and paroxysmal nocturnal dyspnea.   Respiratory: Negative for cough, shortness of breath and wheezing.    Endocrine: Negative.    Hematologic/Lymphatic: Does not bruise/bleed easily.   Skin: Negative for nail changes and rash.   Musculoskeletal: Negative.  Negative for back pain.   Gastrointestinal: Negative for abdominal pain, melena, nausea and vomiting.   Neurological: Negative for dizziness and headaches.   Psychiatric/Behavioral: Positive for altered mental status. Negative for depression and substance abuse.   Allergic/Immunologic: Negative.      Objective:     Vital Signs (Most Recent):  Temp: 98 °F (36.7 °C) (07/11/19 0715)  Pulse: 99 (07/11/19 1015)  Resp: (!) 41 (07/11/19 1015)  BP: (!) 154/80 (07/11/19 1100)  SpO2: 95 % (07/11/19 1015) Vital Signs (24h Range):  Temp:  [97.7 °F (36.5 °C)-98 °F (36.7 °C)] 98 °F (36.7 °C)  Pulse:  [] 99  Resp:  [8-41] 41  SpO2:  [91 %-100 %] 95 %  BP: ()/(41-80) 154/80  Arterial Line BP: ()/() 120/51     Weight: 61.5 kg (135 lb 9.3 oz)  Body mass index is 21.24 kg/m².     SpO2: 95 %  O2 Device (Oxygen Therapy): nasal cannula      Intake/Output Summary (Last 24 hours) at 7/11/2019 1138  Last data filed at 7/11/2019 0900  Gross per 24 hour   Intake 550 ml   Output 1025 ml   Net -475 ml       Lines/Drains/Airways     Arterial Line                 Arterial Line 07/08/19 0125 Left Radial 3 days           Peripheral Intravenous Line                 Peripheral IV - Single Lumen 07/07/19 1500 18 G Left Forearm 3 days         Peripheral IV - Single Lumen 07/08/19 0029 20 G Right Forearm 3 days                Physical Exam   Constitutional: He is oriented to person, place, and time. He appears well-developed and well-nourished.   HENT:   Head: Normocephalic and atraumatic.   Eyes: Pupils are equal, round, and reactive to light. Conjunctivae and EOM are normal.   Neck: No JVD present.   Cardiovascular: Normal rate, regular rhythm, normal heart sounds and intact distal pulses. Exam reveals no gallop and no friction rub.   No murmur heard.  Pulmonary/Chest: Effort normal. No stridor. He has no wheezes. He has no rales. He exhibits no tenderness.   Abdominal: Soft. Bowel sounds are normal. He exhibits no distension and no mass. There is no tenderness. There is no guarding.   Musculoskeletal: He exhibits no edema, tenderness or deformity.   Neurological: He is alert and oriented to person, place, and time.   Skin: Skin is warm and dry. No rash noted. No erythema.   Psychiatric: He has a normal mood and affect.   Agitated, but responsive to questions, directable       Significant Labs:   CMP   Recent Labs   Lab 07/10/19  0440 07/10/19  1627 07/11/19  0300   * 128* 134*   K 3.5 3.6 4.0   CL 95 94* 98   CO2 27 25 26   GLU 95 81 98   BUN 27* 24* 25*   CREATININE 1.4 1.2 1.2   CALCIUM 8.8 8.8 8.8   PROT 6.2 5.9* 6.0   ALBUMIN 2.2* 2.1* 2.1*   BILITOT 0.7 0.7 0.5   ALKPHOS 62 61 65   AST 35 38 38   ALT 7* 9* 10   ANIONGAP 10 9 10   ESTGFRAFRICA >60.0 >60.0 >60.0   EGFRNONAA 52.7* >60.0 >60.0    and CBC   Recent Labs   Lab 07/09/19  1458 07/10/19  0440 07/11/19  0300   WBC 17.49* 15.01* 9.44   HGB 12.1* 11.3* 10.7*   HCT 36.9* 34.6* 33.6*   * 113* 105*       Significant Imaging: Echocardiogram:   Transthoracic echo (TTE) complete (Cupid Only):   Results for orders placed or performed during the hospital encounter of  07/07/19   Transthoracic echo (TTE) 2D with Color Flow   Result Value Ref Range    Ascending aorta 3.07 cm    STJ 3.14 cm    AV mean gradient 2 mmHg    Ao peak aaliyah 0.90 m/s    Ao VTI 12.22 cm    IVS 1.01 0.6 - 1.1 cm    LA size 4.50 cm    Left Atrium Major Axis 5.88 cm    Left Atrium Minor Axis 5.21 cm    LVIDD 5.76 3.5 - 6.0 cm    LVIDS 4.88 (A) 2.1 - 4.0 cm    LVOT diameter 2.22 cm    LVOT peak VTI 10.53 cm    PW 0.62 0.6 - 1.1 cm    RA Major Axis 5.72 cm    RA Width 3.28 cm    RVDD 2.62 cm    Sinus 3.18 cm    TAPSE 1.33 cm    TDI LATERAL 0.17 m/s    TDI SEPTAL 0.09 m/s    LA WIDTH 4.05 cm    LV Diastolic Volume 164.01 mL    LV Systolic Volume 111.91 mL    LVOT peak aaliyah 0.74 m/s    FS 15 %    LA volume 85.59 cm3    LV mass 177.38 g    Left Ventricle Relative Wall Thickness 0.22 cm    AV valve area 3.33 cm2    AV Velocity Ratio 0.82     AV index (prosthetic) 0.86     Mean e' 0.13 m/s    LVOT area 3.9 cm2    LVOT stroke volume 40.74 cm3    AV peak gradient 3 mmHg    LV Systolic Volume Index 64.8 mL/m2    LV Diastolic Volume Index 94.97 mL/m2    LA Volume Index 49.6 mL/m2    LV Mass Index 103 g/m2    BSA 1.72 m2    Narrative    · Severely decreased left ventricular systolic function with evidence of   basal inferior akinesis. The estimated ejection fraction is 25%.  · Mildly to moderately reduced right ventricular systolic function.  · Left ventricular diastolic dysfunction.  · Severe left atrial enlargement.  · Mild mitral regurgitation.  · Mechanically ventilated; cannot use inferior caval vein diameter to   estimate central venous pressure.

## 2019-07-11 NOTE — PROGRESS NOTES
[facility]  Cardiology  Progress Note    Patient Name: Yogesh Lima  MRN: [id number]  Admission Date: 7/7/2019  Hospital Length of Stay: 4 days  Code Status: Prior   Attending Physician: Hilton Hanson MD   Primary Care Physician: [facility]  Expected Discharge Date: 7/12/2019  Principal Problem:AAA (abdominal aortic aneurysm) without rupture    Subjective:     Hospital Course:   No notes on file    Interval History: Felt to be poor [facility] candidate given not likely in cardiogenic shock, altered sensorium. Worsening altered mental status concerning for EtOH withdrawal for which started on haldol, librium and ativan pushes. This AM patient had transient hypotension, reportedly to MAPs in 40s for which restarted on dobutamine. Denies chest pain, shortness of breath, palpitations.      Review of Systems   Constitution: Negative. Negative for chills and fever.   HENT: Negative.    Eyes: Negative.    Cardiovascular: Negative for chest pain, claudication and paroxysmal nocturnal dyspnea.   Respiratory: Negative for cough, shortness of breath and wheezing.    Endocrine: Negative.    Hematologic/Lymphatic: Does not bruise/bleed easily.   Skin: Negative for nail changes and rash.   Musculoskeletal: Negative.  Negative for back pain.   Gastrointestinal: Negative for abdominal pain, melena, nausea and vomiting.   Neurological: Negative for dizziness and headaches.   Psychiatric/Behavioral: Positive for altered mental status. Negative for depression and substance abuse.   Allergic/Immunologic: Negative.      Objective:     Vital Signs (Most Recent):  Temp: 98 °F (36.7 °C) (07/11/19 0715)  Pulse: 99 (07/11/19 1015)  Resp: (!) 41 (07/11/19 1015)  BP: (!) 154/80 (07/11/19 1100)  SpO2: 95 % (07/11/19 1015) Vital Signs (24h Range):  Temp:  [97.7 °F (36.5 °C)-98 °F (36.7 °C)] 98 °F (36.7 °C)  Pulse:  [] 99  Resp:  [8-41] 41  SpO2:  [91 %-100 %] 95 %  BP: ()/(41-80) 154/80  Arterial  Line BP: ()/() 120/51     Weight: 61.5 kg (135 lb 9.3 oz)  Body mass index is 21.24 kg/m².     SpO2: 95 %  O2 Device (Oxygen Therapy): nasal cannula      Intake/Output Summary (Last 24 hours) at 7/11/2019 1138  Last data filed at 7/11/2019 0900  Gross per 24 hour   Intake 550 ml   Output 1025 ml   Net -475 ml       Lines/Drains/Airways     Arterial Line                 Arterial Line 07/08/19 0125 Left Radial 3 days          Peripheral Intravenous Line                 Peripheral IV - Single Lumen 07/07/19 1500 18 G Left Forearm 3 days         Peripheral IV - Single Lumen 07/08/19 0029 20 G Right Forearm 3 days                Physical Exam   Constitutional: He is oriented to person, place, and time. He appears well-developed and well-nourished.   HENT:   Head: Normocephalic and atraumatic.   Eyes: Pupils are equal, round, and reactive to light. Conjunctivae and EOM are normal.   Neck: No JVD present.   Cardiovascular: Normal rate, regular rhythm, normal heart sounds and intact distal pulses. Exam reveals no gallop and no friction rub.   No murmur heard.  Pulmonary/Chest: Effort normal. No stridor. He has no wheezes. He has no rales. He exhibits no tenderness.   Abdominal: Soft. Bowel sounds are normal. He exhibits no distension and no mass. There is no tenderness. There is no guarding.   Musculoskeletal: He exhibits no edema, tenderness or deformity.   Neurological: He is alert and oriented to person, place, and time.   Skin: Skin is warm and dry. No rash noted. No erythema.   Psychiatric: He has a normal mood and affect.   Agitated, but responsive to questions, directable       Significant Labs:   CMP   Recent Labs   Lab 07/10/19  0440 07/10/19  1627 07/11/19  0300   * 128* 134*   K 3.5 3.6 4.0   CL 95 94* 98   CO2 27 25 26   GLU 95 81 98   BUN 27* 24* 25*   CREATININE 1.4 1.2 1.2   CALCIUM 8.8 8.8 8.8   PROT 6.2 5.9* 6.0   ALBUMIN 2.2* 2.1* 2.1*   BILITOT 0.7 0.7 0.5   ALKPHOS 62 61 65   AST 35 38  38   ALT 7* 9* 10   ANIONGAP 10 9 10   ESTGFRAFRICA >60.0 >60.0 >60.0   EGFRNONAA 52.7* >60.0 >60.0    and CBC   Recent Labs   Lab 07/09/19  1458 07/10/19  0440 07/11/19  0300   WBC 17.49* 15.01* 9.44   HGB 12.1* 11.3* 10.7*   HCT 36.9* 34.6* 33.6*   * 113* 105*       Significant Imaging: Echocardiogram:   Transthoracic echo (TTE) complete (Cupid Only):   Results for orders placed or performed during the hospital encounter of 07/07/19   Transthoracic echo (TTE) 2D with Color Flow   Result Value Ref Range    Ascending aorta 3.07 cm    STJ 3.14 cm    AV mean gradient 2 mmHg    Ao peak aaliyah 0.90 m/s    Ao VTI 12.22 cm    IVS 1.01 0.6 - 1.1 cm    LA size 4.50 cm    Left Atrium Major Axis 5.88 cm    Left Atrium Minor Axis 5.21 cm    LVIDD 5.76 3.5 - 6.0 cm    LVIDS 4.88 (A) 2.1 - 4.0 cm    LVOT diameter 2.22 cm    LVOT peak VTI 10.53 cm    PW 0.62 0.6 - 1.1 cm    RA Major Axis 5.72 cm    RA Width 3.28 cm    RVDD 2.62 cm    Sinus 3.18 cm    TAPSE 1.33 cm    TDI LATERAL 0.17 m/s    TDI SEPTAL 0.09 m/s    LA WIDTH 4.05 cm    LV Diastolic Volume 164.01 mL    LV Systolic Volume 111.91 mL    LVOT peak aaliyah 0.74 m/s    FS 15 %    LA volume 85.59 cm3    LV mass 177.38 g    Left Ventricle Relative Wall Thickness 0.22 cm    AV valve area 3.33 cm2    AV Velocity Ratio 0.82     AV index (prosthetic) 0.86     Mean e' 0.13 m/s    LVOT area 3.9 cm2    LVOT stroke volume 40.74 cm3    AV peak gradient 3 mmHg    LV Systolic Volume Index 64.8 mL/m2    LV Diastolic Volume Index 94.97 mL/m2    LA Volume Index 49.6 mL/m2    LV Mass Index 103 g/m2    BSA 1.72 m2    Narrative    · Severely decreased left ventricular systolic function with evidence of   basal inferior akinesis. The estimated ejection fraction is 25%.  · Mildly to moderately reduced right ventricular systolic function.  · Left ventricular diastolic dysfunction.  · Severe left atrial enlargement.  · Mild mitral regurgitation.  · Mechanically ventilated; cannot use inferior  caval vein diameter to   estimate central venous pressure.        Assessment and Plan:     64 y/o M with history of CAD s/p CABG 1993, HFrEF (presumed 2/2 ICM last EF 42%), PVD, COPD, AAA presenting with abdominal pain s/p EVAR   with course complicated by acute on chronic systolic heart failure and NSTEMI, managed medically.    Hypotension  -Patient continues to have transient hypotension which is unlikely cardiogenic (lactate, Cr wnl, extremities WWPx4; alternate explanation for AMS, I.e. Withdrawal)  - Would wean dobutamine as tolerated  - If patient has further drops in BP for which primary team feels additional therapy needed, would consider peripheral vasoconstrictors (I.e. Levophed). If otherwise indicated, central venous access would also be helpful for obtaining hemodynamics.      Acute on chronic combined systolic and diastolic congestive heart failure  - Patient euvolemic, holding additional diuresis for now  - Strict I/Os  - Will hold additional goal directed medical therapy      NSTEMI (non-ST elevated myocardial infarction)  Unclear etiology, possible 2/2 to demand given hypotension, though ischemia itself may be driving his hypotension. EKG changes (anterolateral TWIs), trop elevation noted.     - s/p , continue ASA 81 qd, Plavix 75mg daily  - continue high intenity statin         VTE Risk Mitigation (From admission, onward)        Ordered     heparin (porcine) injection 5,000 Units  Every 8 hours      07/10/19 0959     IP VTE HIGH RISK PATIENT  Once      07/07/19 2330          Elvis Muñoz MD  Cardiology  [facility]

## 2019-07-11 NOTE — ASSESSMENT & PLAN NOTE
63-year-old male with acute on chronic CHF exacerbation found to have a AAA enlarged to greater than 6cm from previous now s/p EVAR.     Neuro:  -Weaned off of propofol and fentanyl  -started on nicotine patch  -Continued to need haldol, ativan and precedex for increased agitation overnight 7/11  -Started on Librium    Cards:  Currently with CHF exacerbation.  -SVT overnight. EKG showed abnormal ST segments and inverted t waves   -started on heparin per ACS protocol  -Diuresis started 7/8. Will continue w/  -Echo 7/8: 25% EF  -keep systolics less than 140 and HR normal  -weaned off dobutamine 7/10. Restarted  7/11 for decreased BPs    Resp:  -Extubated and on room air    FENGI  -NPO   -replace lytes as needed  -GI ppx    Heme:  CBC stable post op. Doing well.   -trend H/H  -DVT ppx    Renal:  Baseline CKD with admitting creatinine of 1.4.   -trend renal function  -continue diuresis as kidneys tolerate    Endo:  Blood sugars stable. No history of diabetes.   -monitor blood sugar.     ID:  -Continue prophylactic abx for 2 doses post op  -d/c vanc and continue cefepime. F/u cultures  -Will d/c cefepime today  -trend white count    Dispo: Plan for step-down soon

## 2019-07-11 NOTE — ASSESSMENT & PLAN NOTE
63-year-old male with history of HTN, HLD, current smoker, CAD s/p multi-vessel CABG (1993), heart failure, COPD, and known AAA (discovered April 2019)  who presented as transfer from OSH given symptomatic enlarging AAA. He presented to OSH this evening given abdominal pain of several days, worsening in nature, worst in LLQ and radiating to his back. He also was complaining of bilateral lower extremity edema, shortness of breath and dyspnea on exertion. BNP was 16,200. CT abdomen was performed given abdominal pain and history of AAA and his aneurysm was noted to be enlarged to 6.1cm from previous diameter of 4.8cm. Given this he was transferred to [facility] for emergent vascular surgery intervention. He's now s/p EVAR and rextubated off pressors.     ID consulted for: Antibiotics recs for PNA    Patient has cough with hypotension episodes he is on NC 2 L maintain O2: 98% . CTA abdomen and pelvis 07/07: Bilateral basilar atelectatic versus consolidative change overlying mild bilateral pleural fluid collections. BAL culture: MORAXELLA (BRANHAMELLA) CATARRHALIS      Antibiotics he was receiving:  Cefazolin 07/07-07/08  Cefepime 07/08-07/10  Vancomycin 07/07-07/09    Recommendations:  - Stop Cefepime and vancomycin  - Start Amoxi-clav 875-125mg BID for 5 days

## 2019-07-11 NOTE — HPI
63-year-old male with history of HTN, HLD, current smoker, CAD s/p multi-vessel CABG (1993), heart failure, COPD, and known AAA (discovered April 2019)  who presented as transfer from OSH given symptomatic enlarging AAA. He presented to OSH this evening given abdominal pain of several days, worsening in nature, worst in LLQ and radiating to his back. He also was complaining of bilateral lower extremity edema, shortness of breath and dyspnea on exertion. BNP was 16,200. CT abdomen was performed given abdominal pain and history of AAA and his aneurysm was noted to be enlarged to 6.1cm from previous diameter of 4.8cm. Given this he was transferred to [facility] for emergent vascular surgery intervention. He's now s/p EVAR and rextubated off pressors.     Patient has cough with hypotension episodes he is on NC 2 L maintain O2: 98% . CTA abdomen and pelvis 07/07: Bilateral basilar atelectatic versus consolidative change overlying mild bilateral pleural fluid collections. BAL culture: MORAXELLA (BRANHAMELLA) CATARRHALIS    ID consulted for: Antibiotics recs for PNA

## 2019-07-11 NOTE — PHYSICIAN QUERY
PT Name: Yogesh Lima  MR #: [id number]     Physician Query Form - Diagnosis Clarification      CDS/: Lexis James               Contact information:[email or web address]    This form is a permanent document in the medical record.     Query Date: July 11, 2019    By submitting this query, we are merely seeking further clarification of documentation.  Please utilize your independent clinical judgment when addressing the question(s) below.     The medical record contains the following:      Findings Supporting Clinical Information Location in Medical Record   Cardiogenic shock    64 y/o M with history of CAD s/p CABG 1993, HFrEF (presumed 2/2 ICM last EF 42%), PVD, COPD, AAA presenting with abdominal pain s/p EVAR with course complicated by hypotension (possible shock) in setting of decompensated systolic heart failure and NSTEMI.        NSTEMI    Unclear etiology, possible 2/2 to demand given hypotension, though ischemia itself may be driving his hypotension. EKG changes (anterolateral TWIs), trop elevation noted.        - s/p , ordered for ASA 81 qd    - continue high intenity statin    - heparin gtt ordered, will continue at least 48 hrs (discussed with surgery)    - holding on BB for now given hypotension, will consider in AM    - Will consider for LHC when more stable. May also have to consider in setting of worsening hemodynamic instability of workup suggestive of cardiogenic shock     BP: ()/(47-92)     IV Dobutamine   Cardiology consult note 7-8                                                          Mar 7-8 to 7-11     Please clarify if the Cardiogenic shock diagnosis has been:    [  X] Ruled In   [  ] Ruled Out   [  ] Other/Clarification of findings (please specify):     [  ] Clinically undetermined     Please document in your progress notes daily for the duration of treatment, until resolved, and include in your discharge summary.

## 2019-07-11 NOTE — SUBJECTIVE & OBJECTIVE
Interval History/Significant Events: Overnight, pt became increasingly agitated requiring ativan and haldol. Was not able to wean . Plan to wean today. No other acute event overnight    Follow-up For: Procedure(s) (LRB):  REPAIR-ANEURYSM-ABDOMINAL AORTIC-ENDOVASCULAR (AAA) (Bilateral)    Post-Operative Day: 3 Days Post-Op    Objective:     Vital Signs (Most Recent):  Temp: 97.8 °F (36.6 °C) (07/10/19 1500)  Pulse: 107 (07/10/19 1815)  Resp: (!) 23 (07/10/19 1815)  BP: 129/68 (07/10/19 1800)  SpO2: 97 % (07/10/19 1800) Vital Signs (24h Range):  Temp:  [97.7 °F (36.5 °C)-98.7 °F (37.1 °C)] 97.8 °F (36.6 °C)  Pulse:  [] 107  Resp:  [8-31] 23  SpO2:  [90 %-100 %] 97 %  BP: ()/(52-75) 129/68  Arterial Line BP: ()/(43-74) 129/63     Weight: 62 kg (136 lb 11 oz)  Body mass index is 21.41 kg/m².      Intake/Output Summary (Last 24 hours) at 7/10/2019 1951  Last data filed at 7/10/2019 1800  Gross per 24 hour   Intake 1068.2 ml   Output 2925 ml   Net -1856.8 ml       Physical Exam   Constitutional: He is oriented to person, place, and time.   Disheveled   HENT:   Head: Normocephalic and atraumatic.   Cardiovascular: Normal rate and regular rhythm.   Pulmonary/Chest: Effort normal and breath sounds normal.   Abdominal: Soft. Bowel sounds are normal.   Genitourinary:   Genitourinary Comments: Mari catheter in place   Neurological: He is alert and oriented to person, place, and time.   Skin: Skin is warm and dry.   Psychiatric: He has a normal mood and affect. His behavior is normal.   Nursing note and vitals reviewed.      Vents:  Vent Mode: A/C (07/09/19 0927)  Ventilator Initiated: Yes (07/07/19 2318)  Set Rate: 16 bmp (07/09/19 0927)  Vt Set: 380 mL (07/09/19 0927)  PEEP/CPAP: 5 cmH20 (07/09/19 0927)  Oxygen Concentration (%): 100 (07/09/19 2326)  Peak Airway Pressure: 20 cmH2O (07/09/19 0927)  Plateau Pressure: 12 cmH20 (07/09/19 0927)  Total Ve: 3.77 mL (07/09/19 0927)  F/VT Ratio<105 (RSBI): 1461.54  (07/09/19 0927)    Lines/Drains/Airways     Arterial Line                 Arterial Line 07/08/19 0125 Left Radial 2 days          Peripheral Intravenous Line                 Peripheral IV - Single Lumen 07/07/19 1500 18 G Left Forearm 3 days         Peripheral IV - Single Lumen 07/08/19 0029 20 G Right Forearm 2 days                Significant Labs:    CBC/Anemia Profile:  Recent Labs   Lab 07/09/19  0251 07/09/19  1458 07/10/19  0440   WBC 16.14* 17.49* 15.01*   HGB 12.0* 12.1* 11.3*   HCT 37.0* 36.9* 34.6*   PLT 92* 106* 113*   MCV 79* 80* 79*   RDW 16.5* 16.4* 16.0*        Chemistries:  Recent Labs   Lab 07/09/19  1458 07/10/19  0141 07/10/19  0440 07/10/19  1627   * 131* 132* 128*   K 4.0 3.6 3.5 3.6   CL 98 94* 95 94*   CO2 27 26 27 25   BUN 23 26* 27* 24*   CREATININE 1.4 1.4 1.4 1.2   CALCIUM 9.3 9.1 8.8 8.8   ALBUMIN 2.2*  --  2.2* 2.1*   PROT 6.3  --  6.2 5.9*   BILITOT 0.9  --  0.7 0.7   ALKPHOS 62  --  62 61   ALT 5*  --  7* 9*   AST 15  --  35 38   MG 1.9 1.9 1.8 2.1   PHOS 5.0* 3.9 3.3 3.4       ABGs:   Recent Labs   Lab 07/09/19  0327   PH 7.485*   PCO2 33.4*   HCO3 25.1   POCSATURATED 100   BE 2       Significant Imaging:  I have reviewed all pertinent imaging results/findings within the past 24 hours.

## 2019-07-11 NOTE — SUBJECTIVE & OBJECTIVE
Interval History: Overnight, he was having low MAP ~ 50 and received 2.5 dobutamine from around 2000 - 0500 this morning. Currently off. Still not able to communicate and mildly sedated with Precedex     Objective:     Vital Signs (Most Recent):  Temp: 98 °F (36.7 °C) (07/10/19 2300)  Pulse: 83 (07/11/19 0600)  Resp: (!) 22 (07/11/19 0600)  BP: (!) 105/59 (07/11/19 0600)  SpO2: 100 % (07/11/19 0600) Vital Signs (24h Range):  Temp:  [97.7 °F (36.5 °C)-98 °F (36.7 °C)] 98 °F (36.7 °C)  Pulse:  [] 83  Resp:  [8-35] 22  SpO2:  [91 %-100 %] 100 %  BP: ()/(41-77) 105/59  Arterial Line BP: ()/() 109/53     Weight: 61.5 kg (135 lb 9.3 oz)  Body mass index is 21.24 kg/m².    SpO2: 100 %  O2 Device (Oxygen Therapy): nasal cannula      Intake/Output Summary (Last 24 hours) at 7/11/2019 0704  Last data filed at 7/11/2019 0600  Gross per 24 hour   Intake 550 ml   Output 1945 ml   Net -1395 ml       Lines/Drains/Airways     Arterial Line                 Arterial Line 07/08/19 0125 Left Radial 3 days          Peripheral Intravenous Line                 Peripheral IV - Single Lumen 07/07/19 1500 18 G Left Forearm 3 days         Peripheral IV - Single Lumen 07/08/19 0029 20 G Right Forearm 3 days                Physical Exam   Constitutional: No distress.   Frail and not able to respond to questions    HENT:   Head: Normocephalic.   Eyes: Right eye exhibits no discharge. Left eye exhibits no discharge.   Neck: Normal range of motion. No thyromegaly present.   Cardiovascular: Normal rate.   Murmur (grade systolic murmur ) heard.  Pulmonary/Chest: He has no wheezes. He has no rales.   Abdominal: Soft. He exhibits no distension.   Skin: Skin is warm. No erythema.       Significant Labs:   ABG: No results for input(s): PH, PCO2, HCO3, POCSATURATED, BE in the last 48 hours., CMP   Recent Labs   Lab 07/10/19  0440 07/10/19  1627 07/11/19  0300   * 128* 134*   K 3.5 3.6 4.0   CL 95 94* 98   CO2 27 25 26   GLU  95 81 98   BUN 27* 24* 25*   CREATININE 1.4 1.2 1.2   CALCIUM 8.8 8.8 8.8   PROT 6.2 5.9* 6.0   ALBUMIN 2.2* 2.1* 2.1*   BILITOT 0.7 0.7 0.5   ALKPHOS 62 61 65   AST 35 38 38   ALT 7* 9* 10   ANIONGAP 10 9 10   ESTGFRAFRICA >60.0 >60.0 >60.0   EGFRNONAA 52.7* >60.0 >60.0   , CBC   Recent Labs   Lab 07/09/19  1458 07/10/19  0440 07/11/19  0300   WBC 17.49* 15.01* 9.44   HGB 12.1* 11.3* 10.7*   HCT 36.9* 34.6* 33.6*   * 113* 105*    and INR No results for input(s): INR, PROTIME in the last 48 hours.    Significant Imaging: EKG: persistant T wave inversion on V2-V4

## 2019-07-11 NOTE — PLAN OF CARE
Pt resting comfortably in bed, all VSS at this time  Pt remains confused, disoriented to place and situation  On 2 L NC, SpO2 > 95%  Gtts: precedex  B/L soft wrist restraints in place, pt combative overnight, no injuries notes  Haldol admin x 1, librium admin x 1  Bladder scan overnnight > 200 mL, team aware, pt voided x 1 75 mL  Labs trended, lactic pending  NPO since midnight   Accuchecks Q4h  Plan to wean precedex this AM d/t prolonged QT per AM EKG

## 2019-07-11 NOTE — ASSESSMENT & PLAN NOTE
62yo M with multiple medical co-morbidities who presented as transfer from OSH given symptomatic, enlarging AAA now s/p emergent EVAR on 7/7    Neuro: On precedex this morning for agitation, continue to monitor neuro status, may be withdrawaling - librium taper started 7/10, wean precedex as tolerated today   Resp: Maintaining sats on nasal cannula, wean O2 as tolerated, duonebs q4 in setting of known COPD, ABGs prn, continuous pulse ox  CV: TTE with evidence of CHF, EF 25%, BNP significantly elevated when checked, has been off dobutamine and stable since yesterday, will start coreg 3.125mg BID per cards recs, follow up additional recs.  Continue daily ASA, statin; SBP < 140, HR < 100;   FEN/GI: NPO for now until passes speech/swallow, then advance diet per SICU team, replete lytes   Renal:Monitor BUN/Cr, continue condom cath for accurate I/Os for now  Heme/ID: H/H stable, WBC 9, pan cultured 7/9, resp cultures with moraxella - consult ID, continue cefepime for now  Endo: POCT blood glucose, no current issues, continue to monitor  Msk: No issues, continue to monitor    Ppx: [facility]  Dispo: Continue ICU care until stable off precedex, then plan for stepdown with sitter, PT/OT

## 2019-07-12 PROBLEM — I95.9 HYPOTENSION: Status: ACTIVE | Noted: 2019-01-01

## 2019-07-12 NOTE — PROGRESS NOTES
[facility]  Interventional Cardiology  Progress Note    Patient Name: Yogesh Lima  MRN: [id number]  Admission Date: 7/7/2019  Hospital Length of Stay: 5 days  Code Status: Prior   Attending Physician: Hilton Hanson MD   Primary Care Physician: [facility]  Principal Problem:AAA (abdominal aortic aneurysm) without rupture    Subjective:     Interval History: Overnight he was still on Precedex and being hypotensive with MAP ~50-60 and Dobutamine resumed. He has been fluctuating in his mental status. Denies chest pain or shortness of breath      Objective:     Vital Signs (Most Recent):  Temp: 97.6 °F (36.4 °C) (07/12/19 0715)  Pulse: 80 (07/12/19 0700)  Resp: 17 (07/12/19 0700)  BP: (!) 95/50 (07/12/19 0700)  SpO2: (!) 93 % (07/12/19 0700) Vital Signs (24h Range):  Temp:  [97.6 °F (36.4 °C)-98.2 °F (36.8 °C)] 97.6 °F (36.4 °C)  Pulse:  [] 80  Resp:  [10-50] 17  SpO2:  [73 %-100 %] 93 %  BP: ()/(50-83) 95/50  Arterial Line BP: ()/() 113/70     Weight: 62.4 kg (137 lb 9.1 oz)  Body mass index is 21.55 kg/m².    SpO2: (!) 93 %  O2 Device (Oxygen Therapy): nasal cannula      Intake/Output Summary (Last 24 hours) at 7/12/2019 0841  Last data filed at 7/12/2019 0630  Gross per 24 hour   Intake 233 ml   Output 1625 ml   Net -1392 ml       Lines/Drains/Airways       Peripheral Intravenous Line                   Peripheral IV - Single Lumen 07/07/19 1500 18 G Left Forearm 4 days         Peripheral IV - Single Lumen 07/08/19 0029 20 G Right Forearm 4 days                    Physical Exam   Constitutional: No distress.   Frail and not able to respond to questions    HENT:   Head: Normocephalic.   Eyes: Right eye exhibits no discharge. Left eye exhibits no discharge.   Neck: Normal range of motion. No thyromegaly present.   Cardiovascular: Normal rate.   Murmur (grade 2 systolic murmur ) heard.  Pulmonary/Chest: He has no wheezes. He has no rales.   Abdominal:  Soft. He exhibits no distension.   Skin: Skin is warm. No erythema.       Significant Labs:   BMP:   Recent Labs   Lab 07/10/19  1627 07/11/19  0300 07/12/19  0314   GLU 81 98 111*   * 134* 136   K 3.6 4.0 4.0   CL 94* 98 104   CO2 25 26 23   BUN 24* 25* 23   CREATININE 1.2 1.2 1.1   CALCIUM 8.8 8.8 8.8   MG 2.1 2.2 2.2   , CMP   Recent Labs   Lab 07/10/19  1627 07/11/19  0300 07/12/19  0314   * 134* 136   K 3.6 4.0 4.0   CL 94* 98 104   CO2 25 26 23   GLU 81 98 111*   BUN 24* 25* 23   CREATININE 1.2 1.2 1.1   CALCIUM 8.8 8.8 8.8   PROT 5.9* 6.0 6.1   ALBUMIN 2.1* 2.1* 2.1*   BILITOT 0.7 0.5 0.4   ALKPHOS 61 65 64   AST 38 38 32   ALT 9* 10 12   ANIONGAP 9 10 9   ESTGFRAFRICA >60.0 >60.0 >60.0   EGFRNONAA >60.0 >60.0 >60.0    and All pertinent lab results from the last 24 hours have been reviewed.    Significant Imaging: Echocardiogram:   2D echo with color flow doppler:      and Transthoracic echo (TTE) complete (Cupid Only):    Severely decreased left ventricular systolic function with evidence of   basal inferior akinesis. The estimated ejection fraction is 25%.  · Mildly to moderately reduced right ventricular systolic function.  · Left ventricular diastolic dysfunction.  · Severe left atrial enlargement.  · Mild mitral regurgitation.  · Mechanically ventilated; cannot use inferior caval vein diameter to   estimate central venous pressure.    ECG reviewed     Assessment and Plan:     Patient is a 64 y.o. male presenting with:    NSTEMI (non-ST elevated myocardial infarction)  - High suspicious is Type II NSTEMI (demand ischemia) especially in the setting of having hypotensive episode and provoked by underlying extensive coronary artery disease   - EVAR extended through common iliac arteries   - Dobumatin is less likely the appropriate pressors in this case (NSTEMI) consider alternative vasopressor.     Recommendations:   - Continue on Aspirin 81 mg PO daily and Plavix 75 mg PO daily   - Wean off  weaning off sedation (Precedex) and discontinuing Dobutamine   - No plan for [facility] at this moment  - Defer management to primary team and consult service.   - Discussed with interventional cardiology staff and primary team   - Will sign off, please call for question or concern.     VTE Risk Mitigation (From admission, onward)        Ordered     heparin (porcine) injection 5,000 Units  Every 8 hours      07/10/19 0959     IP VTE HIGH RISK PATIENT  Once      07/07/19 2330          Cheikh Garland MD  Interventional Cardiology  [facility]

## 2019-07-12 NOTE — ASSESSMENT & PLAN NOTE
Unclear etiology, low suspicion for cardiogenic given no evidence of end organ dysfunction, cool extremities, dynamic changes in wall motion  - If patient again exhibits shock, would recommend obtaining central venous access and obtaining CVP, mVO2, David calculation to determine etiology of shock

## 2019-07-12 NOTE — SUBJECTIVE & OBJECTIVE
Interval History: Overnight he was still on Precedex and being hypotensive with MAP ~50-60 and Dobutamine resumed. He has been fluctuating in his mental status. Denies chest pain or shortness of breath        Objective:     Vital Signs (Most Recent):  Temp: 97.6 °F (36.4 °C) (07/12/19 0715)  Pulse: 80 (07/12/19 0700)  Resp: 17 (07/12/19 0700)  BP: (!) 95/50 (07/12/19 0700)  SpO2: (!) 93 % (07/12/19 0700) Vital Signs (24h Range):  Temp:  [97.6 °F (36.4 °C)-98.2 °F (36.8 °C)] 97.6 °F (36.4 °C)  Pulse:  [] 80  Resp:  [10-50] 17  SpO2:  [73 %-100 %] 93 %  BP: ()/(50-83) 95/50  Arterial Line BP: ()/() 113/70     Weight: 62.4 kg (137 lb 9.1 oz)  Body mass index is 21.55 kg/m².    SpO2: (!) 93 %  O2 Device (Oxygen Therapy): nasal cannula      Intake/Output Summary (Last 24 hours) at 7/12/2019 0841  Last data filed at 7/12/2019 0630  Gross per 24 hour   Intake 233 ml   Output 1625 ml   Net -1392 ml       Lines/Drains/Airways     Peripheral Intravenous Line                 Peripheral IV - Single Lumen 07/07/19 1500 18 G Left Forearm 4 days         Peripheral IV - Single Lumen 07/08/19 0029 20 G Right Forearm 4 days                Physical Exam   Constitutional: No distress.   Frail and not able to respond to questions    HENT:   Head: Normocephalic.   Eyes: Right eye exhibits no discharge. Left eye exhibits no discharge.   Neck: Normal range of motion. No thyromegaly present.   Cardiovascular: Normal rate.   Murmur (grade 2 systolic murmur ) heard.  Pulmonary/Chest: He has no wheezes. He has no rales.   Abdominal: Soft. He exhibits no distension.   Skin: Skin is warm. No erythema.       Significant Labs:   BMP:   Recent Labs   Lab 07/10/19  1627 07/11/19  0300 07/12/19  0314   GLU 81 98 111*   * 134* 136   K 3.6 4.0 4.0   CL 94* 98 104   CO2 25 26 23   BUN 24* 25* 23   CREATININE 1.2 1.2 1.1   CALCIUM 8.8 8.8 8.8   MG 2.1 2.2 2.2   , CMP   Recent Labs   Lab 07/10/19  1627 07/11/19  0300  07/12/19  0314   * 134* 136   K 3.6 4.0 4.0   CL 94* 98 104   CO2 25 26 23   GLU 81 98 111*   BUN 24* 25* 23   CREATININE 1.2 1.2 1.1   CALCIUM 8.8 8.8 8.8   PROT 5.9* 6.0 6.1   ALBUMIN 2.1* 2.1* 2.1*   BILITOT 0.7 0.5 0.4   ALKPHOS 61 65 64   AST 38 38 32   ALT 9* 10 12   ANIONGAP 9 10 9   ESTGFRAFRICA >60.0 >60.0 >60.0   EGFRNONAA >60.0 >60.0 >60.0    and All pertinent lab results from the last 24 hours have been reviewed.    Significant Imaging: Echocardiogram:   2D echo with color flow doppler:      and Transthoracic echo (TTE) complete (Cupid Only):    Severely decreased left ventricular systolic function with evidence of   basal inferior akinesis. The estimated ejection fraction is 25%.  · Mildly to moderately reduced right ventricular systolic function.  · Left ventricular diastolic dysfunction.  · Severe left atrial enlargement.  · Mild mitral regurgitation.  · Mechanically ventilated; cannot use inferior caval vein diameter to   estimate central venous pressure.    ECG reviewed

## 2019-07-12 NOTE — PROGRESS NOTES
Notified Dr. Ng of SBP > 140 and HR > 100. Administered standing order of PRN hydralazine with minimal effect. Dr. Ng and Dr. Hanson to present to bedside.

## 2019-07-12 NOTE — PROGRESS NOTES
[facility]  Vascular Surgery  Progress Note    Patient Name: Yogesh Lima  MRN: [id number]  Admission Date: 7/7/2019  Primary Care Provider: [facility]    Subjective:     Interval History: More alert this AM, requesting home sleep meds, perseverating on the fact we have not been giving them to him. On minimal nasal cannula O2. Has been off  since 10pm yesterday evening without hypotension.     Post-Op Info:  Procedure(s) (LRB):  REPAIR-ANEURYSM-ABDOMINAL AORTIC-ENDOVASCULAR (AAA) (Bilateral)   5 Days Post-Op       Medications:  Continuous Infusions:   dexmedetomidine (PRECEDEX) infusion 0.6 mcg/kg/hr (07/12/19 0500)    DOBUTamine Stopped (07/11/19 2200)     Scheduled Meds:   albuterol-ipratropium  3 mL Nebulization Q4H    amoxicillin-clavulanate 875-125mg  1 tablet Oral Q12H    aspirin  81 mg Per OG tube Daily    atorvastatin  80 mg Per OG tube Daily    chlordiazepoxide  10 mg Oral QID    Followed by    chlordiazepoxide  10 mg Oral TID    Followed by    [START ON 7/14/2019] chlordiazepoxide  5 mg Oral TID    Followed by    [START ON 7/15/2019] chlordiazepoxide  5 mg Oral BID    Followed by    [START ON 7/16/2019] chlordiazepoxide  5 mg Oral Once    clopidogrel  75 mg Oral Daily    folic acid  1 mg Oral Daily    heparin (porcine)  5,000 Units Subcutaneous Q8H    multivitamin  1 tablet Oral Daily    mupirocin  1 g Nasal BID    nicotine  1 patch Transdermal Daily    tamsulosin  0.4 mg Oral Daily    thiamine  100 mg Oral Daily    traZODone  100 mg Oral QHS     PRN Meds:acetaminophen, Dextrose 10% Bolus, Dextrose 10% Bolus, fentaNYL, haloperidol lactate, hydrALAZINE, HYDROcodone-acetaminophen, HYDROcodone-acetaminophen, magnesium sulfate IVPB, magnesium sulfate IVPB, potassium chloride in water **AND** potassium chloride in water **AND** potassium chloride in water     Objective:     Vital Signs (Most Recent):  Temp: 97.8 °F (36.6 °C) (07/12/19 0300)  Pulse:  86 (07/12/19 0615)  Resp: (!) 21 (07/12/19 0615)  BP: (!) 95/50 (07/12/19 0600)  SpO2: (!) 73 % (07/12/19 0615) Vital Signs (24h Range):  Temp:  [97.8 °F (36.6 °C)-98.2 °F (36.8 °C)] 97.8 °F (36.6 °C)  Pulse:  [] 86  Resp:  [10-50] 21  SpO2:  [73 %-100 %] 73 %  BP: ()/(50-83) 95/50  Arterial Line BP: ()/() 106/56         Physical Exam   Constitutional: He is oriented to person, place, and time. He appears cachectic.   Disheveled    HENT:   Head: Normocephalic and atraumatic.   Nose: Nose normal.   Eyes: Pupils are equal, round, and reactive to light. Conjunctivae and EOM are normal. No scleral icterus.   Neck: Normal range of motion. No thyromegaly present.   Cardiovascular: Normal rate and regular rhythm. Exam reveals no gallop and no friction rub.   No murmur heard.  Pulses:       Radial pulses are 2+ on the right side, and 2+ on the left side.        Femoral pulses are 2+ on the right side, and 2+ on the left side.  PTs dopplerable in bilateral legs  DP with weak monophasic signal in right leg, non-dopplerable in left   Pulmonary/Chest:   On nasal cannula    Abdominal: Soft. He exhibits no distension. Mass:    There is no tenderness.   Genitourinary:   Genitourinary Comments: Bilateral groin sites c/d/i; no evidence of hematoma    Musculoskeletal: Normal range of motion.   Lymphadenopathy:     He has no cervical adenopathy.   Neurological: He is alert and oriented to person, place, and time.   Skin: Skin is warm and dry. No rash noted.       Significant Labs:  CBC:   Recent Labs   Lab 07/12/19 0314   WBC 6.68   RBC 4.06*   HGB 10.3*   HCT 32.4*   *   MCV 80*   MCH 25.4*   MCHC 31.8*     CMP:   Recent Labs   Lab 07/12/19 0314   *   CALCIUM 8.8   ALBUMIN 2.1*   PROT 6.1      K 4.0   CO2 23      BUN 23   CREATININE 1.1   ALKPHOS 64   ALT 12   AST 32   BILITOT 0.4     Coagulation: No results for input(s): LABPROT, INR, APTT in the last 48 hours.    Significant  Diagnostics:  I have reviewed all pertinent imaging results/findings within the past 24 hours.    Assessment/Plan:     Aneurysm of infrarenal abdominal aorta  64yo M with multiple medical co-morbidities who presented as transfer from OSH given symptomatic, enlarging AAA now s/p emergent EVAR on 7/7    Neuro: On precedex this morning for agitation, continue to monitor neuro status, may be withdrawaling - librium taper started 7/10, wean precedex as tolerated today, consider sitter for stepdown   Resp: Maintaining sats on nasal cannula, wean O2 as tolerated, duonebs q4 in setting of known COPD, ABGs prn, continuous pulse ox  CV: TTE with evidence of CHF, EF 25%, BNP significantly elevated when checked, required dobutamine intermittently for hypotension yesterday, off since 10pm, follow up additional cardiology recs.  Continue daily ASA, statin; SBP < 140, HR < 100; repeat TTE today, consider stepdown with cardiology team to CSU  FEN/GI: Advance diet as tolerated per speech, replete lytes   Renal:Monitor BUN/Cr, monitor UOP  Heme/ID: H/H stable, WBC 6, pan cultured 7/9, resp cultures with moraxella - augmentin per ID for 5 days  Endo: POCT blood glucose, no current issues, continue to monitor  Msk: No issues, continue to monitor    Ppx: SQH  Dispo: Continue ICU care until stable off precedex, then plan for stepdown with sitter, PT/OT          Carmne Ng MD  Vascular Surgery  [facility]

## 2019-07-12 NOTE — SUBJECTIVE & OBJECTIVE
Interval History/Significant Events: Respiratory cultures positive for moraxella catarrhalis. Started on Augmentin. Continued to have agitation and restlessness overnight. Restarted precedex 0.6 and continued librium taper. Decreased UO. Started on home flomax. Bladder scan continue to show increased residuals. Straight cath this AM (7/12) ~1L.     Follow-up For: Procedure(s) (LRB):  REPAIR-ANEURYSM-ABDOMINAL AORTIC-ENDOVASCULAR (AAA) (Bilateral)    Post-Operative Day: 5 Days Post-Op    Objective:     Vital Signs (Most Recent):  Temp: 97.8 °F (36.6 °C) (07/12/19 0300)  Pulse: 86 (07/12/19 0615)  Resp: (!) 21 (07/12/19 0615)  BP: (!) 95/50 (07/12/19 0600)  SpO2: (!) 73 % (07/12/19 0615) Vital Signs (24h Range):  Temp:  [97.8 °F (36.6 °C)-98.2 °F (36.8 °C)] 97.8 °F (36.6 °C)  Pulse:  [] 86  Resp:  [10-50] 21  SpO2:  [73 %-100 %] 73 %  BP: ()/(50-83) 95/50  Arterial Line BP: ()/() 106/56     Weight: 62.4 kg (137 lb 9.1 oz)  Body mass index is 21.55 kg/m².      Intake/Output Summary (Last 24 hours) at 7/12/2019 0726  Last data filed at 7/12/2019 0630  Gross per 24 hour   Intake 233 ml   Output 1625 ml   Net -1392 ml       Physical Exam   Constitutional: He appears well-developed and well-nourished.   Cardiovascular: Normal rate and regular rhythm.   Pulmonary/Chest: Effort normal and breath sounds normal.   Abdominal: Soft. Bowel sounds are normal.   Neurological: He is disoriented.   Confused   Skin: Skin is warm and dry.   Psychiatric: He is agitated.   Nursing note and vitals reviewed.      Vents:  Vent Mode: A/C (07/09/19 0927)  Ventilator Initiated: Yes (07/07/19 2318)  Set Rate: 16 bmp (07/09/19 0927)  Vt Set: 380 mL (07/09/19 0927)  PEEP/CPAP: 5 cmH20 (07/09/19 0927)  Oxygen Concentration (%): 28 (07/11/19 1606)  Peak Airway Pressure: 20 cmH2O (07/09/19 0927)  Plateau Pressure: 12 cmH20 (07/09/19 0927)  Total Ve: 3.77 mL (07/09/19 0927)  F/VT Ratio<105 (RSBI): 1461.54 (07/09/19  0927)    Lines/Drains/Airways     Peripheral Intravenous Line                 Peripheral IV - Single Lumen 07/07/19 1500 18 G Left Forearm 4 days         Peripheral IV - Single Lumen 07/08/19 0029 20 G Right Forearm 4 days                Significant Labs:    CBC/Anemia Profile:  Recent Labs   Lab 07/11/19  0300 07/12/19  0314   WBC 9.44 6.68   HGB 10.7* 10.3*   HCT 33.6* 32.4*   * 129*   MCV 81* 80*   RDW 16.1* 15.9*        Chemistries:  Recent Labs   Lab 07/10/19  1627 07/11/19  0300 07/12/19  0314   * 134* 136   K 3.6 4.0 4.0   CL 94* 98 104   CO2 25 26 23   BUN 24* 25* 23   CREATININE 1.2 1.2 1.1   CALCIUM 8.8 8.8 8.8   ALBUMIN 2.1* 2.1* 2.1*   PROT 5.9* 6.0 6.1   BILITOT 0.7 0.5 0.4   ALKPHOS 61 65 64   ALT 9* 10 12   AST 38 38 32   MG 2.1 2.2 2.2   PHOS 3.4 3.2 3.2       ABGs:   Recent Labs   Lab 07/11/19  0753   PH 7.483*   PCO2 40.3   HCO3 30.2*   POCSATURATED 99   BE 7     Lactic Acid:   Recent Labs   Lab 07/11/19  0559   LACTATE 0.7     Significant Imaging:  I have reviewed all pertinent imaging results/findings within the past 24 hours.

## 2019-07-12 NOTE — PROGRESS NOTES
Notified Dr. Choudhury of pt c/o increased suprapubic pain and difficulty voiding. Still due to void this shift. Bladder scan with > 948 mL. Orders to place Mari catheter at this time. Will carry out.

## 2019-07-12 NOTE — ASSESSMENT & PLAN NOTE
Unclear etiology, possible 2/2 to demand given hypotension, though ischemia itself may be driving his hypotension. EKG changes (anterolateral TWIs), trop elevation noted.    - s/p , ordered for ASA 81 qd  - s/p heparin gtt  - Continue plavix  - continue high intenity statin

## 2019-07-12 NOTE — PROGRESS NOTES
[facility]  Cardiology  Progress Note    Patient Name: Yogesh Lima  MRN: [id number]  Admission Date: 7/7/2019  Hospital Length of Stay: 5 days  Code Status: Prior   Attending Physician: Hilton Hanson MD   Primary Care Physician: [facility]  Expected Discharge Date: 7/17/2019  Principal Problem:AAA (abdominal aortic aneurysm) without rupture    Subjective:     Hospital Course:   No notes on file    Interval History: This AM, restarted briefly on dobutamine for MAPs into 40s, asymptomatic, mental status wnl. Repeat TTE showing EF25% with global hypokinesis, not significantly changed from prior.     Review of Systems   Constitution: Negative. Negative for chills and fever.   HENT: Negative.    Eyes: Negative.    Cardiovascular: Negative for chest pain, claudication and paroxysmal nocturnal dyspnea.   Respiratory: Negative for cough, shortness of breath and wheezing.    Endocrine: Negative.    Hematologic/Lymphatic: Does not bruise/bleed easily.   Skin: Negative for nail changes and rash.   Musculoskeletal: Negative.  Negative for back pain.   Gastrointestinal: Negative for abdominal pain, melena, nausea and vomiting.   Neurological: Negative for dizziness and headaches.   Psychiatric/Behavioral: Negative for altered mental status, depression and substance abuse.   Allergic/Immunologic: Negative.      Objective:     Vital Signs (Most Recent):  Temp: 99.4 °F (37.4 °C) (07/12/19 1515)  Pulse: 108 (07/12/19 1637)  Resp: 18 (07/12/19 1637)  BP: 132/74 (07/12/19 1600)  SpO2: 96 % (07/12/19 1637) Vital Signs (24h Range):  Temp:  [97.6 °F (36.4 °C)-99.4 °F (37.4 °C)] 99.4 °F (37.4 °C)  Pulse:  [] 108  Resp:  [10-36] 18  SpO2:  [73 %-100 %] 96 %  BP: ()/(50-83) 132/74  Arterial Line BP: ()/(37-70) 126/62     Weight: 62.1 kg (137 lb)  Body mass index is 21.46 kg/m².     SpO2: 96 %  O2 Device (Oxygen Therapy): room air      Intake/Output Summary (Last 24 hours) at  7/12/2019 1724  Last data filed at 7/12/2019 1600  Gross per 24 hour   Intake 502 ml   Output 2490 ml   Net -1988 ml       Lines/Drains/Airways     Drain                 Urethral Catheter 07/12/19 1400 less than 1 day          Peripheral Intravenous Line                 Peripheral IV - Single Lumen 07/08/19 0029 20 G Right Forearm 4 days                Physical Exam   Constitutional: He is oriented to person, place, and time. He appears well-developed and well-nourished.   HENT:   Head: Normocephalic and atraumatic.   Eyes: Pupils are equal, round, and reactive to light. Conjunctivae and EOM are normal.   Neck: No JVD present.   Cardiovascular: Normal rate, regular rhythm, normal heart sounds and intact distal pulses. Exam reveals no gallop and no friction rub.   No murmur heard.  Pulmonary/Chest: Effort normal. No stridor. He has no wheezes. He has no rales. He exhibits no tenderness.   Abdominal: Soft. Bowel sounds are normal. He exhibits no distension and no mass. There is no tenderness. There is no guarding.   Musculoskeletal: He exhibits no edema, tenderness or deformity.   Neurological: He is alert and oriented to person, place, and time.   Skin: Skin is warm and dry. No rash noted. No erythema.   Psychiatric: He has a normal mood and affect.       Significant Labs:   CMP   Recent Labs   Lab 07/11/19  0300 07/12/19  0314   * 136   K 4.0 4.0   CL 98 104   CO2 26 23   GLU 98 111*   BUN 25* 23   CREATININE 1.2 1.1   CALCIUM 8.8 8.8   PROT 6.0 6.1   ALBUMIN 2.1* 2.1*   BILITOT 0.5 0.4   ALKPHOS 65 64   AST 38 32   ALT 10 12   ANIONGAP 10 9   ESTGFRAFRICA >60.0 >60.0   EGFRNONAA >60.0 >60.0    and CBC   Recent Labs   Lab 07/11/19  0300 07/12/19  0314   WBC 9.44 6.68   HGB 10.7* 10.3*   HCT 33.6* 32.4*   * 129*       Significant Imaging: Echocardiogram:   Transthoracic echo (TTE) complete (Cupid Only):   Results for orders placed or performed during the hospital encounter of 07/07/19   Transthoracic  echo (TTE) 2D with Color Flow   Result Value Ref Range    Ascending aorta 3.07 cm    STJ 3.14 cm    AV mean gradient 2 mmHg    Ao peak aaliyah 0.90 m/s    Ao VTI 12.22 cm    IVS 1.01 0.6 - 1.1 cm    LA size 4.50 cm    Left Atrium Major Axis 5.88 cm    Left Atrium Minor Axis 5.21 cm    LVIDD 5.76 3.5 - 6.0 cm    LVIDS 4.88 (A) 2.1 - 4.0 cm    LVOT diameter 2.22 cm    LVOT peak VTI 10.53 cm    PW 0.62 0.6 - 1.1 cm    RA Major Axis 5.72 cm    RA Width 3.28 cm    RVDD 2.62 cm    Sinus 3.18 cm    TAPSE 1.33 cm    TDI LATERAL 0.17 m/s    TDI SEPTAL 0.09 m/s    LA WIDTH 4.05 cm    LV Diastolic Volume 164.01 mL    LV Systolic Volume 111.91 mL    LVOT peak aaliyah 0.74 m/s    FS 15 %    LA volume 85.59 cm3    LV mass 177.38 g    Left Ventricle Relative Wall Thickness 0.22 cm    AV valve area 3.33 cm2    AV Velocity Ratio 0.82     AV index (prosthetic) 0.86     Mean e' 0.13 m/s    LVOT area 3.9 cm2    LVOT stroke volume 40.74 cm3    AV peak gradient 3 mmHg    LV Systolic Volume Index 64.8 mL/m2    LV Diastolic Volume Index 94.97 mL/m2    LA Volume Index 49.6 mL/m2    LV Mass Index 103 g/m2    BSA 1.72 m2    Narrative    · Severely decreased left ventricular systolic function with evidence of   basal inferior akinesis. The estimated ejection fraction is 25%.  · Mildly to moderately reduced right ventricular systolic function.  · Left ventricular diastolic dysfunction.  · Severe left atrial enlargement.  · Mild mitral regurgitation.  · Mechanically ventilated; cannot use inferior caval vein diameter to   estimate central venous pressure.        Assessment and Plan:     Acute on chronic combined systolic and diastolic congestive heart failure  64 y/o M with history of CAD s/p CABG 1993, HFrEF (presumed 2/2 ICM last EF 42%), PVD, COPD, AAA presenting with abdominal pain s/p EVAR with course complicated by hypotension (possible shock) in setting of decompensated systolic heart failure and NSTEMI. Additionally with intermittent hypotension  without clear markers of cardiogenic shock (I.e. BRITNEY, transaminitis, cool extremities, etc)    - Patient euvolemic, holding additional diuresis for now  - Strict I/Os  - Will hold additional goal directed medical therapy for now given intermittent hypotension      Hypotension  Unclear etiology, low suspicion for cardiogenic given no evidence of end organ dysfunction, cool extremities, dynamic changes in wall motion  - If patient again exhibits shock, would recommend obtaining central venous access and obtaining CVP, mVO2, David calculation to determine etiology of shock    NSTEMI (non-ST elevated myocardial infarction)  Unclear etiology, possible 2/2 to demand given hypotension, though ischemia itself may be driving his hypotension. EKG changes (anterolateral TWIs), trop elevation noted.    - s/p , ordered for ASA 81 qd  - s/p heparin gtt  - Continue plavix  - continue high intenity statin      Thank you for this interesting consult. Cardiology will continue to follow.      VTE Risk Mitigation (From admission, onward)        Ordered     heparin (porcine) injection 5,000 Units  Every 8 hours      07/10/19 0959     IP VTE HIGH RISK PATIENT  Once      07/07/19 4900          Elvis Muñoz MD  Cardiology  [facility]

## 2019-07-12 NOTE — SUBJECTIVE & OBJECTIVE
Interval History: This AM, restarted briefly on dobutamine for MAPs into 40s, asymptomatic, mental status wnl. Repeat TTE showing EF25% with global hypokinesis, not significantly changed from prior.     Review of Systems   Constitution: Negative. Negative for chills and fever.   HENT: Negative.    Eyes: Negative.    Cardiovascular: Negative for chest pain, claudication and paroxysmal nocturnal dyspnea.   Respiratory: Negative for cough, shortness of breath and wheezing.    Endocrine: Negative.    Hematologic/Lymphatic: Does not bruise/bleed easily.   Skin: Negative for nail changes and rash.   Musculoskeletal: Negative.  Negative for back pain.   Gastrointestinal: Negative for abdominal pain, melena, nausea and vomiting.   Neurological: Negative for dizziness and headaches.   Psychiatric/Behavioral: Negative for altered mental status, depression and substance abuse.   Allergic/Immunologic: Negative.      Objective:     Vital Signs (Most Recent):  Temp: 99.4 °F (37.4 °C) (07/12/19 1515)  Pulse: 108 (07/12/19 1637)  Resp: 18 (07/12/19 1637)  BP: 132/74 (07/12/19 1600)  SpO2: 96 % (07/12/19 1637) Vital Signs (24h Range):  Temp:  [97.6 °F (36.4 °C)-99.4 °F (37.4 °C)] 99.4 °F (37.4 °C)  Pulse:  [] 108  Resp:  [10-36] 18  SpO2:  [73 %-100 %] 96 %  BP: ()/(50-83) 132/74  Arterial Line BP: ()/(37-70) 126/62     Weight: 62.1 kg (137 lb)  Body mass index is 21.46 kg/m².     SpO2: 96 %  O2 Device (Oxygen Therapy): room air      Intake/Output Summary (Last 24 hours) at 7/12/2019 1724  Last data filed at 7/12/2019 1600  Gross per 24 hour   Intake 502 ml   Output 2490 ml   Net -1988 ml       Lines/Drains/Airways     Drain                 Urethral Catheter 07/12/19 1400 less than 1 day          Peripheral Intravenous Line                 Peripheral IV - Single Lumen 07/08/19 0029 20 G Right Forearm 4 days                Physical Exam   Constitutional: He is oriented to person, place, and time. He appears  well-developed and well-nourished.   HENT:   Head: Normocephalic and atraumatic.   Eyes: Pupils are equal, round, and reactive to light. Conjunctivae and EOM are normal.   Neck: No JVD present.   Cardiovascular: Normal rate, regular rhythm, normal heart sounds and intact distal pulses. Exam reveals no gallop and no friction rub.   No murmur heard.  Pulmonary/Chest: Effort normal. No stridor. He has no wheezes. He has no rales. He exhibits no tenderness.   Abdominal: Soft. Bowel sounds are normal. He exhibits no distension and no mass. There is no tenderness. There is no guarding.   Musculoskeletal: He exhibits no edema, tenderness or deformity.   Neurological: He is alert and oriented to person, place, and time.   Skin: Skin is warm and dry. No rash noted. No erythema.   Psychiatric: He has a normal mood and affect.       Significant Labs:   CMP   Recent Labs   Lab 07/11/19  0300 07/12/19  0314   * 136   K 4.0 4.0   CL 98 104   CO2 26 23   GLU 98 111*   BUN 25* 23   CREATININE 1.2 1.1   CALCIUM 8.8 8.8   PROT 6.0 6.1   ALBUMIN 2.1* 2.1*   BILITOT 0.5 0.4   ALKPHOS 65 64   AST 38 32   ALT 10 12   ANIONGAP 10 9   ESTGFRAFRICA >60.0 >60.0   EGFRNONAA >60.0 >60.0    and CBC   Recent Labs   Lab 07/11/19  0300 07/12/19  0314   WBC 9.44 6.68   HGB 10.7* 10.3*   HCT 33.6* 32.4*   * 129*       Significant Imaging: Echocardiogram:   Transthoracic echo (TTE) complete (Cupid Only):   Results for orders placed or performed during the hospital encounter of 07/07/19   Transthoracic echo (TTE) 2D with Color Flow   Result Value Ref Range    Ascending aorta 3.07 cm    STJ 3.14 cm    AV mean gradient 2 mmHg    Ao peak aaliyah 0.90 m/s    Ao VTI 12.22 cm    IVS 1.01 0.6 - 1.1 cm    LA size 4.50 cm    Left Atrium Major Axis 5.88 cm    Left Atrium Minor Axis 5.21 cm    LVIDD 5.76 3.5 - 6.0 cm    LVIDS 4.88 (A) 2.1 - 4.0 cm    LVOT diameter 2.22 cm    LVOT peak VTI 10.53 cm    PW 0.62 0.6 - 1.1 cm    RA Major Axis 5.72 cm    RA  Width 3.28 cm    RVDD 2.62 cm    Sinus 3.18 cm    TAPSE 1.33 cm    TDI LATERAL 0.17 m/s    TDI SEPTAL 0.09 m/s    LA WIDTH 4.05 cm    LV Diastolic Volume 164.01 mL    LV Systolic Volume 111.91 mL    LVOT peak aaliyah 0.74 m/s    FS 15 %    LA volume 85.59 cm3    LV mass 177.38 g    Left Ventricle Relative Wall Thickness 0.22 cm    AV valve area 3.33 cm2    AV Velocity Ratio 0.82     AV index (prosthetic) 0.86     Mean e' 0.13 m/s    LVOT area 3.9 cm2    LVOT stroke volume 40.74 cm3    AV peak gradient 3 mmHg    LV Systolic Volume Index 64.8 mL/m2    LV Diastolic Volume Index 94.97 mL/m2    LA Volume Index 49.6 mL/m2    LV Mass Index 103 g/m2    BSA 1.72 m2    Narrative    · Severely decreased left ventricular systolic function with evidence of   basal inferior akinesis. The estimated ejection fraction is 25%.  · Mildly to moderately reduced right ventricular systolic function.  · Left ventricular diastolic dysfunction.  · Severe left atrial enlargement.  · Mild mitral regurgitation.  · Mechanically ventilated; cannot use inferior caval vein diameter to   estimate central venous pressure.

## 2019-07-12 NOTE — PT/OT/SLP EVAL
"Speech Language Pathology Evaluation  Bedside Swallow    Patient Name:  Yogesh Lima   MRN:  [id number]  Admitting Diagnosis: AAA (abdominal aortic aneurysm) without rupture    Recommendations:                 General Recommendations:  follow up diet tolerance  Diet recommendations:  Regular, Thin   Aspiration Precautions:   · Eliminate distractions  · Feed only when awake/alert  · Meds whole 1 at a time  · Monitor for s/s of aspiration  · Remain upright 30 minutes post meal  · Standard aspiration precautions   General Precautions: Standard,    Communication strategies:  none    History:     Past Medical History:   Diagnosis Date    Coronary artery disease     Hyperlipidemia     Hypertension        Past Surgical History:   Procedure Laterality Date    REPAIR-ANEURYSM-ABDOMINAL AORTIC-ENDOVASCULAR (AAA) Bilateral 7/7/2019    Performed by Hilton Hanson MD at [facility] OR [address] [place] [address]       Prior diet: regular / thin liquids.     Subjective     Patient asleep upon ST entry to room, roused given verbal and tactile cues.   Confusion present.   Patient pointing to doppler on counter in room and asked ST "Can you hand me my pager over there?"    Pain/Comfort:  · Pain Rating 1: 0/10    Objective:     Oral Musculature Evaluation  · Oral Musculature: WFL  · Dentition: present and adequate  · Secretion Management: adequate  · Mucosal Quality: good  · Mandibular Strength and Mobility: WFL  · Oral Labial Strength and Mobility: WFL  · Lingual Strength and Mobility: WFL  · Velar Elevation: WFL  · Buccal Strength and Mobility: WFL  · Volitional Cough: Present  · Volitional Swallow: Present  · Voice Prior to PO Intake: Clear    Bedside Swallow Eval:   Consistencies Assessed:  · Thin liquids via straw sips x4  · Solids via 1 cracker     Oral Phase:   · WFL    Pharyngeal Phase:   · no overt clinical signs/symptoms of aspiration  · no overt clinical signs/symptoms of pharyngeal dysphagia    Compensatory Strategies  · None    Treatment: " Shortly into session, patient became hypotensive. RN present and aware, MD also entered room at this time.  ST educated RN to withhold trays should patient present with poor level of alertness, though at this time appears safe with regular consistencies and thin liquids. Skilled education was provided to patient re: diet recs, standard aspiration precautions of which to follow, and ongoing ST plan of care.    Assessment:     Yogesh Lima is a 64 y.o. male with an SLP diagnosis of at risk for dysphagia.      Goals:   Multidisciplinary Problems     SLP Goals        Problem: SLP Goal    Goal Priority Disciplines Outcome   SLP Goal     SLP    Description:  Speech Language Pathology Goals  Goals expected to be met by 7/19:  1. Patient will tolerate a regular consistency diet and thin liquids with no overt signs of airway compromise.                           Plan:     · Patient to be seen:  2 x/week   · Plan of Care expires:     · Plan of Care reviewed with:  patient   · SLP Follow-Up:  Yes       Discharge recommendations:  other (see comments)   Barriers to Discharge:  None    Time Tracking:     SLP Treatment Date:   07/12/19  Speech Start Time:  1020  Speech Stop Time:  1028     Speech Total Time (min):  8 min    Billable Minutes: Eval Swallow and Oral Function 8    Emily Abadie, CCC-SLP  07/12/2019

## 2019-07-12 NOTE — CONSULTS
[facility]  Adult Nutrition  Consult Note    RD received consult regarding nutritional supplementation, decreased albumin. Albumin and prealbumin should not be used as indicators of nutritional status. Both can be affected by inflammation and fluid status.     Noted SLP evaluated patient this afternoon, cleared for regular diet. Encourage increased PO intake as tolerated. If poor PO intake, recommend adding Boost Plus OS to all meals. If patient to remain hospitalized 10 days, RD to complete full assessment. Please reconsult as needed.

## 2019-07-12 NOTE — ASSESSMENT & PLAN NOTE
64yo M with multiple medical co-morbidities who presented as transfer from OSH given symptomatic, enlarging AAA now s/p emergent EVAR on 7/7    Neuro: On precedex this morning for agitation, continue to monitor neuro status, may be withdrawaling - librium taper started 7/10, wean precedex as tolerated today, consider sitter for stepdown   Resp: Maintaining sats on nasal cannula, wean O2 as tolerated, duonebs q4 in setting of known COPD, ABGs prn, continuous pulse ox  CV: TTE with evidence of CHF, EF 25%, BNP significantly elevated when checked, required dobutamine intermittently for hypotension yesterday, off since 10pm, follow up additional cardiology recs.  Continue daily ASA, statin; SBP < 140, HR < 100; repeat TTE today, consider stepdown with cardiology team to CSU  FEN/GI: Advance diet as tolerated per speech, replete lytes   Renal:Monitor BUN/Cr, monitor UOP  Heme/ID: H/H stable, WBC 6, pan cultured 7/9, resp cultures with moraxella - augmentin per ID for 5 days  Endo: POCT blood glucose, no current issues, continue to monitor  Msk: No issues, continue to monitor    Ppx: [facility]  Dispo: Continue ICU care until stable off precedex, then plan for stepdown with sitter, PT/OT

## 2019-07-12 NOTE — PROGRESS NOTES
Notified Dr. Olvera of pt's HR > 100 despite PRN administrations. Orders to administer 5 mg metoprolol at this time. Will carry out.

## 2019-07-12 NOTE — PLAN OF CARE
Problem: Adult Inpatient Plan of Care  Goal: Plan of Care Review  Outcome: Ongoing (interventions implemented as appropriate)  OOBTC today for 5 hours  RA, SpO2 100%  Precedex and Dobutamine remain off  Echo today  Mari inserted d/t retention  PRN medications administered to maintain HR < 100. Vascular and SICU teams aware  Diet advanced.  Transfer orders in place, awaiting bed placement at this time.

## 2019-07-12 NOTE — PROGRESS NOTES
[facility]  Critical Care - Surgery  Progress Note    Patient Name: Yogesh Lima  MRN: [id number]  Admission Date: 7/7/2019  Hospital Length of Stay: 5 days  Code Status: Prior  Attending Provider: Hilton Hanson MD  Primary Care Provider: [facility]   Principal Problem: AAA (abdominal aortic aneurysm) without rupture    Subjective:     Hospital/ICU Course:  63-year-old male with history of HTN, HLD, current smoker, CAD s/p multi-vessel CABG (1993), heart failure, COPD, and known AAA (discovered April 2019)  who presented as transfer from OSH given symptomatic enlarging AAA. He presented to OSH this evening given abdominal pain of several days, worsening in nature, worst in LLQ and radiating to his back. He also was complaining of bilateral lower extremity edema, shortness of breath and dyspnea on exertion. BNP was 16,200. CT abdomen was performed given abdominal pain and history of AAA and his aneurysm was noted to be enlarged to 6.1cm from previous diameter of 4.8cm. Given this he was transferred to [facility] for emergent vascular surgery intervention. He's now s/p EVAR and remains intubated off pressors.     He became increasingly agitated the night of 7/9 and the morning of 7/10.. He was given ativan, haldol and eventually restarted on Precedex. He was also put in wrist restraints. He was weaned off of .      Interval History/Significant Events: Respiratory cultures positive for moraxella catarrhalis. Started on Augmentin. Continued to have agitation and restlessness overnight. Restarted precedex 0.6 and continued librium taper. Decreased UO. Started on home flomax. Bladder scan continue to show increased residuals. Straight cath this AM (7/12) ~1L.     Follow-up For: Procedure(s) (LRB):  REPAIR-ANEURYSM-ABDOMINAL AORTIC-ENDOVASCULAR (AAA) (Bilateral)    Post-Operative Day: 5 Days Post-Op    Objective:     Vital Signs (Most Recent):  Temp: 97.8 °F (36.6 °C) (07/12/19  0300)  Pulse: 86 (07/12/19 0615)  Resp: (!) 21 (07/12/19 0615)  BP: (!) 95/50 (07/12/19 0600)  SpO2: (!) 73 % (07/12/19 0615) Vital Signs (24h Range):  Temp:  [97.8 °F (36.6 °C)-98.2 °F (36.8 °C)] 97.8 °F (36.6 °C)  Pulse:  [] 86  Resp:  [10-50] 21  SpO2:  [73 %-100 %] 73 %  BP: ()/(50-83) 95/50  Arterial Line BP: ()/() 106/56     Weight: 62.4 kg (137 lb 9.1 oz)  Body mass index is 21.55 kg/m².      Intake/Output Summary (Last 24 hours) at 7/12/2019 0726  Last data filed at 7/12/2019 0630  Gross per 24 hour   Intake 233 ml   Output 1625 ml   Net -1392 ml       Physical Exam   Constitutional: He appears well-developed and well-nourished.   Cardiovascular: Normal rate and regular rhythm.   Pulmonary/Chest: Effort normal and breath sounds normal.   Abdominal: Soft. Bowel sounds are normal.   Neurological: He is disoriented.   Confused   Skin: Skin is warm and dry.   Psychiatric: He is agitated.   Nursing note and vitals reviewed.      Vents:  Vent Mode: A/C (07/09/19 0927)  Ventilator Initiated: Yes (07/07/19 2318)  Set Rate: 16 bmp (07/09/19 0927)  Vt Set: 380 mL (07/09/19 0927)  PEEP/CPAP: 5 cmH20 (07/09/19 0927)  Oxygen Concentration (%): 28 (07/11/19 1606)  Peak Airway Pressure: 20 cmH2O (07/09/19 0927)  Plateau Pressure: 12 cmH20 (07/09/19 0927)  Total Ve: 3.77 mL (07/09/19 0927)  F/VT Ratio<105 (RSBI): 1461.54 (07/09/19 0927)    Lines/Drains/Airways     Peripheral Intravenous Line                 Peripheral IV - Single Lumen 07/07/19 1500 18 G Left Forearm 4 days         Peripheral IV - Single Lumen 07/08/19 0029 20 G Right Forearm 4 days                Significant Labs:    CBC/Anemia Profile:  Recent Labs   Lab 07/11/19  0300 07/12/19  0314   WBC 9.44 6.68   HGB 10.7* 10.3*   HCT 33.6* 32.4*   * 129*   MCV 81* 80*   RDW 16.1* 15.9*        Chemistries:  Recent Labs   Lab 07/10/19  1627 07/11/19  0300 07/12/19  0314   * 134* 136   K 3.6 4.0 4.0   CL 94* 98 104   CO2 25 26 23    BUN 24* 25* 23   CREATININE 1.2 1.2 1.1   CALCIUM 8.8 8.8 8.8   ALBUMIN 2.1* 2.1* 2.1*   PROT 5.9* 6.0 6.1   BILITOT 0.7 0.5 0.4   ALKPHOS 61 65 64   ALT 9* 10 12   AST 38 38 32   MG 2.1 2.2 2.2   PHOS 3.4 3.2 3.2       ABGs:   Recent Labs   Lab 07/11/19  0753   PH 7.483*   PCO2 40.3   HCO3 30.2*   POCSATURATED 99   BE 7     Lactic Acid:   Recent Labs   Lab 07/11/19  0559   LACTATE 0.7     Significant Imaging:  I have reviewed all pertinent imaging results/findings within the past 24 hours.    Assessment/Plan:     Aneurysm of infrarenal abdominal aorta  63-year-old male with acute on chronic CHF exacerbation found to have a AAA enlarged to greater than 6cm from previous now s/p EVAR.     Neuro:  -Weaned off of propofol and fentanyl  -started on nicotine patch  -Continued to need precedex for agitation  -Started Librium taper    Cards:  Currently with CHF exacerbation.  -SVT overnight. EKG showed abnormal ST segments and inverted t waves   -started on heparin per ACS protocol  -Diuresis started 7/8. Will continue w/  -Echo 7/8: 25% EF  -keep systolics less than 140 and HR normal  - currently off. Has been turned on intermittently for low BPs    Resp:  -Extubated and on room air    FENGI  -NPO   -replace lytes as needed  -GI ppx    Heme:  CBC stable post op. Doing well.   -trend H/H  -DVT ppx    Renal:  Baseline CKD with admitting creatinine of 1.4.   -trend renal function  -continue diuresis as kidneys tolerate  -started on home flomax    Endo:  Blood sugars stable. No history of diabetes.   -monitor blood sugar.     ID:  -Continue prophylactic abx for 2 doses post op  -d/c vanc and cefepime.  -Resp cx positve for M. Catarrhalis   -Started on Augmentin  -trend white count    Dispo: Repeat ECHO per vascular. Wean precedex. Plan for step-down         Lexis Choudhury MD  Critical Care - Surgery  [facility]

## 2019-07-12 NOTE — SUBJECTIVE & OBJECTIVE
Medications:  Continuous Infusions:   dexmedetomidine (PRECEDEX) infusion 0.6 mcg/kg/hr (07/12/19 0500)    DOBUTamine Stopped (07/11/19 2200)     Scheduled Meds:   albuterol-ipratropium  3 mL Nebulization Q4H    amoxicillin-clavulanate 875-125mg  1 tablet Oral Q12H    aspirin  81 mg Per OG tube Daily    atorvastatin  80 mg Per OG tube Daily    chlordiazepoxide  10 mg Oral QID    Followed by    chlordiazepoxide  10 mg Oral TID    Followed by    [START ON 7/14/2019] chlordiazepoxide  5 mg Oral TID    Followed by    [START ON 7/15/2019] chlordiazepoxide  5 mg Oral BID    Followed by    [START ON 7/16/2019] chlordiazepoxide  5 mg Oral Once    clopidogrel  75 mg Oral Daily    folic acid  1 mg Oral Daily    heparin (porcine)  5,000 Units Subcutaneous Q8H    multivitamin  1 tablet Oral Daily    mupirocin  1 g Nasal BID    nicotine  1 patch Transdermal Daily    tamsulosin  0.4 mg Oral Daily    thiamine  100 mg Oral Daily    traZODone  100 mg Oral QHS     PRN Meds:acetaminophen, Dextrose 10% Bolus, Dextrose 10% Bolus, fentaNYL, haloperidol lactate, hydrALAZINE, HYDROcodone-acetaminophen, HYDROcodone-acetaminophen, magnesium sulfate IVPB, magnesium sulfate IVPB, potassium chloride in water **AND** potassium chloride in water **AND** potassium chloride in water     Objective:     Vital Signs (Most Recent):  Temp: 97.8 °F (36.6 °C) (07/12/19 0300)  Pulse: 86 (07/12/19 0615)  Resp: (!) 21 (07/12/19 0615)  BP: (!) 95/50 (07/12/19 0600)  SpO2: (!) 73 % (07/12/19 0615) Vital Signs (24h Range):  Temp:  [97.8 °F (36.6 °C)-98.2 °F (36.8 °C)] 97.8 °F (36.6 °C)  Pulse:  [] 86  Resp:  [10-50] 21  SpO2:  [73 %-100 %] 73 %  BP: ()/(50-83) 95/50  Arterial Line BP: ()/() 106/56         Physical Exam   Constitutional: He is oriented to person, place, and time. He appears cachectic.   Disheveled    HENT:   Head: Normocephalic and atraumatic.   Nose: Nose normal.   Eyes: Pupils are equal, round, and  reactive to light. Conjunctivae and EOM are normal. No scleral icterus.   Neck: Normal range of motion. No thyromegaly present.   Cardiovascular: Normal rate and regular rhythm. Exam reveals no gallop and no friction rub.   No murmur heard.  Pulses:       Radial pulses are 2+ on the right side, and 2+ on the left side.        Femoral pulses are 2+ on the right side, and 2+ on the left side.  PTs dopplerable in bilateral legs  DP with weak monophasic signal in right leg, non-dopplerable in left   Pulmonary/Chest:   On nasal cannula    Abdominal: Soft. He exhibits no distension. Mass:    There is no tenderness.   Genitourinary:   Genitourinary Comments: Bilateral groin sites c/d/i; no evidence of hematoma    Musculoskeletal: Normal range of motion.   Lymphadenopathy:     He has no cervical adenopathy.   Neurological: He is alert and oriented to person, place, and time.   Skin: Skin is warm and dry. No rash noted.       Significant Labs:  CBC:   Recent Labs   Lab 07/12/19  0314   WBC 6.68   RBC 4.06*   HGB 10.3*   HCT 32.4*   *   MCV 80*   MCH 25.4*   MCHC 31.8*     CMP:   Recent Labs   Lab 07/12/19  0314   *   CALCIUM 8.8   ALBUMIN 2.1*   PROT 6.1      K 4.0   CO2 23      BUN 23   CREATININE 1.1   ALKPHOS 64   ALT 12   AST 32   BILITOT 0.4     Coagulation: No results for input(s): LABPROT, INR, APTT in the last 48 hours.    Significant Diagnostics:  I have reviewed all pertinent imaging results/findings within the past 24 hours.

## 2019-07-12 NOTE — PLAN OF CARE
Problem: SLP Goal  Goal: SLP Goal  Speech Language Pathology Goals  Goals expected to be met by 7/19:  1. Patient will tolerate a regular consistency diet and thin liquids with no overt signs of airway compromise.         Bedside swallow study completed with implemented plan of care.   Emily P. Abadie M.S., CCC-SLP  Speech Language Pathologist  [phone]  07/12/2019

## 2019-07-12 NOTE — PLAN OF CARE
Pt resting comfortably in bed, all VSS at this time  Pt remains confused, disoriented to place and situation  On 2 L NC, SpO2 > 95%  Gtts: precedex for agitation  B/L soft restraints d/c'd this AM per MD, no injuries noted  Straight cath, 1000 mL output  NPO with sips of water, speech consult this AM  Accuchecks Q4h  Plan to wean precedex as permitted given increasing agitation overnight  Plan of care reviewed with pt  Will continue to monitor

## 2019-07-12 NOTE — ASSESSMENT & PLAN NOTE
64 y/o M with history of CAD s/p CABG 1993, HFrEF (presumed 2/2 ICM last EF 42%), PVD, COPD, AAA presenting with abdominal pain s/p EVAR with course complicated by hypotension (possible shock) in setting of decompensated systolic heart failure and NSTEMI. Additionally with intermittent hypotension without clear markers of cardiogenic shock (I.e. BRITNEY, transaminitis, cool extremities, etc)    RECS  - Patient euvolemic, holding additional diuresis for now  - Strict I/Os  - Will hold additional goal directed medical therapy for now given intermittent hypotension

## 2019-07-12 NOTE — ASSESSMENT & PLAN NOTE
63-year-old male with acute on chronic CHF exacerbation found to have a AAA enlarged to greater than 6cm from previous now s/p EVAR.     Neuro:  -Weaned off of propofol and fentanyl  -started on nicotine patch  -Continued to need precedex for agitation  -Started Librium taper    Cards:  Currently with CHF exacerbation.  -SVT overnight. EKG showed abnormal ST segments and inverted t waves   -started on heparin per ACS protocol  -Diuresis started 7/8. Will continue w/  -Echo 7/8: 25% EF  -keep systolics less than 140 and HR normal  - currently off. Has been turned on intermittently for low BPs    Resp:  -Extubated and on room air    FENGI  -NPO   -replace lytes as needed  -GI ppx    Heme:  CBC stable post op. Doing well.   -trend H/H  -DVT ppx    Renal:  Baseline CKD with admitting creatinine of 1.4.   -trend renal function  -continue diuresis as kidneys tolerate    Endo:  Blood sugars stable. No history of diabetes.   -monitor blood sugar.     ID:  -Continue prophylactic abx for 2 doses post op  -d/c vanc and cefepime.  -Resp cx positve for M. Catarrhalis   -Started on Augmentin  -trend white count    Dispo: Repeat ECHO per vascular. Wean precedex. Plan for step-down

## 2019-07-13 NOTE — ASSESSMENT & PLAN NOTE
Unclear etiology, possible 2/2 to demand given hypotension, currently appears hypovolemic. EKG changes (anterolateral TWIs), trop elevation noted.    - s/p , ordered for ASA 81 qd  - s/p heparin gtt  - Continue plavix  - continue high intenity statin

## 2019-07-13 NOTE — SUBJECTIVE & OBJECTIVE
Interval History/Significant Events: Feeling restless this morning. Otherwise, no acute events.    Follow-up For: Procedure(s) (LRB):  REPAIR-ANEURYSM-ABDOMINAL AORTIC-ENDOVASCULAR (AAA) (Bilateral)    Post-Operative Day: 6 Days Post-Op    Objective:     Vital Signs (Most Recent):  Temp: 97.9 °F (36.6 °C) (07/13/19 1500)  Pulse: (!) 117 (07/13/19 1745)  Resp: (!) 28 (07/13/19 1745)  BP: (!) 119/57 (07/13/19 1730)  SpO2: 98 % (07/13/19 1745) Vital Signs (24h Range):  Temp:  [97.4 °F (36.3 °C)-98.4 °F (36.9 °C)] 97.9 °F (36.6 °C)  Pulse:  [] 117  Resp:  [18-44] 28  SpO2:  [89 %-100 %] 98 %  BP: ()/(50-72) 119/57  Arterial Line BP: ()/(39-72) 116/51     Weight: 62.1 kg (137 lb)  Body mass index is 21.46 kg/m².      Intake/Output Summary (Last 24 hours) at 7/13/2019 1820  Last data filed at 7/13/2019 1700  Gross per 24 hour   Intake 1155.12 ml   Output 1310 ml   Net -154.88 ml       Physical Exam   Constitutional: He is oriented to person, place, and time. He appears cachectic.   Disheveled    HENT:   Head: Normocephalic and atraumatic.   Nose: Nose normal.   Eyes: Pupils are equal, round, and reactive to light. Conjunctivae and EOM are normal. No scleral icterus.   Neck: Normal range of motion. No thyromegaly present.   Cardiovascular: Normal rate and regular rhythm. Exam reveals no gallop and no friction rub.   No murmur heard.  Pulses:       Radial pulses are 2+ on the right side, and 2+ on the left side.        Femoral pulses are 2+ on the right side, and 2+ on the left side.  PTs dopplerable in bilateral legs  DP with weak monophasic signal in right leg, non-dopplerable in left   Pulmonary/Chest:   On nasal cannula    Abdominal: Soft. He exhibits no distension. Mass:    There is no tenderness.   Genitourinary:   Genitourinary Comments: Bilateral groin sites c/d/i; no evidence of hematoma    Musculoskeletal: Normal range of motion.   Lymphadenopathy:     He has no cervical adenopathy.    Neurological: He is alert and oriented to person, place, and time.   Skin: Skin is warm and dry. No rash noted.       Lines/Drains/Airways     Drain                 Urethral Catheter 07/12/19 1400 1 day          Peripheral Intravenous Line                 Peripheral IV - Single Lumen 07/08/19 0029 20 G Right Forearm 5 days         Peripheral IV - Single Lumen 07/12/19 1300 20 G Left Antecubital 1 day                Significant Labs:    CBC/Anemia Profile:  Recent Labs   Lab 07/12/19 0314 07/13/19  0400   WBC 6.68 6.44   HGB 10.3* 9.9*   HCT 32.4* 31.6*   * 128*   MCV 80* 83   RDW 15.9* 15.9*        Chemistries:  Recent Labs   Lab 07/12/19 0314 07/13/19  0400    134*   K 4.0 4.0    101   CO2 23 25   BUN 23 20   CREATININE 1.1 1.0   CALCIUM 8.8 8.6*   ALBUMIN 2.1* 2.1*   PROT 6.1 6.1   BILITOT 0.4 0.4   ALKPHOS 64 78   ALT 12 21   AST 32 34   MG 2.2 2.3   PHOS 3.2 2.3*       All pertinent labs within the past 24 hours have been reviewed.    Significant Imaging:  I have reviewed all pertinent imaging results/findings within the past 24 hours.

## 2019-07-13 NOTE — PROGRESS NOTES
[facility]  Vascular Surgery  Progress Note    Patient Name: Yogesh Lima  MRN: [id number]  Admission Date: 7/7/2019  Primary Care Provider: [facility]    Subjective:     Interval History: no events. Intermittent agitation, but overall HR and BP stable.    Post-Op Info:  Procedure(s) (LRB):  REPAIR-ANEURYSM-ABDOMINAL AORTIC-ENDOVASCULAR (AAA) (Bilateral)   6 Days Post-Op       Medications:  Continuous Infusions:    Scheduled Meds:   albuterol-ipratropium  3 mL Nebulization Q4H    amoxicillin-clavulanate 875-125mg  1 tablet Oral Q12H    aspirin  81 mg Per OG tube Daily    atorvastatin  80 mg Per OG tube Daily    carvedilol  3.125 mg Oral BID    chlordiazepoxide  10 mg Oral TID    Followed by    [START ON 7/14/2019] chlordiazepoxide  5 mg Oral TID    Followed by    [START ON 7/15/2019] chlordiazepoxide  5 mg Oral BID    Followed by    [START ON 7/16/2019] chlordiazepoxide  5 mg Oral Once    chlordiazepoxide  10 mg Oral Once    clopidogrel  75 mg Oral Daily    folic acid  1 mg Oral Daily    heparin (porcine)  5,000 Units Subcutaneous Q8H    losartan  12.5 mg Oral Daily    multivitamin  1 tablet Oral Daily    nicotine  1 patch Transdermal Daily    tamsulosin  0.8 mg Oral Daily    thiamine  100 mg Oral Daily    traZODone  100 mg Oral QHS     PRN Meds:acetaminophen, Dextrose 10% Bolus, Dextrose 10% Bolus, fentaNYL, haloperidol lactate, hydrALAZINE, HYDROcodone-acetaminophen, HYDROcodone-acetaminophen, magnesium sulfate IVPB, magnesium sulfate IVPB, metoprolol, potassium chloride in water **AND** potassium chloride in water **AND** potassium chloride in water, traZODone     Objective:     Vital Signs (Most Recent):  Temp: 98.3 °F (36.8 °C) (07/13/19 1100)  Pulse: 101 (07/13/19 1215)  Resp: (!) 34 (07/13/19 1215)  BP: (!) 102/50 (07/13/19 1200)  SpO2: 100 % (07/13/19 1215) Vital Signs (24h Range):  Temp:  [97.4 °F (36.3 °C)-99.4 °F (37.4 °C)] 98.3 °F (36.8  °C)  Pulse:  [] 101  Resp:  [18-44] 34  SpO2:  [89 %-100 %] 100 %  BP: ()/(50-74) 102/50  Arterial Line BP: ()/(39-73) 108/52     Date 07/13/19 0700 - 07/14/19 0659   Shift 8560-1386 7382-7699 0676-0836 24 Hour Total   INTAKE   P.O. 310   310   Shift Total(mL/kg) 310(5)   310(5)   OUTPUT   Urine(mL/kg/hr) 475   475   Shift Total(mL/kg) 475(7.6)   475(7.6)   Weight (kg) 62.1 62.1 62.1 62.1       Physical Exam   Constitutional: He is oriented to person, place, and time. He appears cachectic.   Disheveled    HENT:   Head: Normocephalic and atraumatic.   Nose: Nose normal.   Eyes: Pupils are equal, round, and reactive to light. Conjunctivae and EOM are normal. No scleral icterus.   Neck: Normal range of motion. No thyromegaly present.   Cardiovascular: Normal rate and regular rhythm. Exam reveals no gallop and no friction rub.   No murmur heard.  Pulses:       Radial pulses are 2+ on the right side, and 2+ on the left side.        Femoral pulses are 2+ on the right side, and 2+ on the left side.  PTs dopplerable in bilateral legs  DP with weak monophasic signal in right leg, non-dopplerable in left   Pulmonary/Chest:   On nasal cannula    Abdominal: Soft. He exhibits no distension. Mass:    There is no tenderness.   Genitourinary:   Genitourinary Comments: Bilateral groin sites c/d/i; no evidence of hematoma    Musculoskeletal: Normal range of motion.   Lymphadenopathy:     He has no cervical adenopathy.   Neurological: He is alert and oriented to person, place, and time.   Skin: Skin is warm and dry. No rash noted.       Significant Labs:  CBC:   Recent Labs   Lab 07/13/19  0400   WBC 6.44   RBC 3.83*   HGB 9.9*   HCT 31.6*   *   MCV 83   MCH 25.8*   MCHC 31.3*     CMP:   Recent Labs   Lab 07/13/19  0400   *   CALCIUM 8.6*   ALBUMIN 2.1*   PROT 6.1   *   K 4.0   CO2 25      BUN 20   CREATININE 1.0   ALKPHOS 78   ALT 21   AST 34   BILITOT 0.4     Coagulation: No results for  input(s): LABPROT, INR, APTT in the last 48 hours.    Significant Diagnostics:  I have reviewed all pertinent imaging results/findings within the past 24 hours.    Assessment/Plan:     Aneurysm of infrarenal abdominal aorta  62yo M with multiple medical co-morbidities who presented as transfer from OSH given symptomatic, enlarging AAA now s/p emergent EVAR on 7/7    Neuro: Intermittent agitation. Librium taper started 7/10. Precedex weaned. consider sitter for stepdown   Resp: Maintaining sats on nasal cannula, wean O2 as tolerated, duonebs q4 in setting of known COPD, ABGs prn, continuous pulse ox  CV: TTE with evidence of CHF, EF 25%, BNP significantly elevated when checked, Now off dobutamine. Continue daily ASA, statin; SBP < 140, HR < 100; repeat TTE today, consider stepdown with cardiology team to [facility]. Per cardiology recs, stopped amlodipine and started losartan 12.5 qd.  FEN/GI: reg diet, replete lytes   Renal:Monitor BUN/Cr, monitor UOP  Heme/ID: H/H stable, WBC 6, pan cultured 7/9, resp cultures with moraxella - augmentin per ID for 5 days  Endo: POCT blood glucose, no current issues, continue to monitor  Msk: No issues, continue to monitor    Ppx: SQH  Dispo: stepdown with sitter, PT/OT/SW. Cousin reportedly has power of  and wants him to go to nursing home, but patient states he wants to go back to his trailer.              Greg Morse MD  Vascular Surgery  [facility]

## 2019-07-13 NOTE — SUBJECTIVE & OBJECTIVE
"Interval History: Patient states he feels "restless". Otherwise no events, doing well, stable vitals.     ROS  Objective:     Vital Signs (Most Recent):  Temp: 98 °F (36.7 °C) (07/13/19 0715)  Pulse: (!) 114 (07/13/19 0830)  Resp: (!) 28 (07/13/19 0830)  BP: 122/68 (07/13/19 0830)  SpO2: 96 % (07/13/19 0830) Vital Signs (24h Range):  Temp:  [97.4 °F (36.3 °C)-99.4 °F (37.4 °C)] 98 °F (36.7 °C)  Pulse:  [] 114  Resp:  [18-44] 28  SpO2:  [94 %-100 %] 96 %  BP: ()/(50-74) 122/68  Arterial Line BP: ()/(40-73) 143/67     Weight: 62.1 kg (137 lb)  Body mass index is 21.46 kg/m².     SpO2: 96 %  O2 Device (Oxygen Therapy): room air      Intake/Output Summary (Last 24 hours) at 7/13/2019 0840  Last data filed at 7/13/2019 0800  Gross per 24 hour   Intake 1623 ml   Output 1845 ml   Net -222 ml       Lines/Drains/Airways     Drain                 Urethral Catheter 07/12/19 1400 less than 1 day          Peripheral Intravenous Line                 Peripheral IV - Single Lumen 07/08/19 0029 20 G Right Forearm 5 days         Peripheral IV - Single Lumen 07/12/19 1300 20 G Left Antecubital less than 1 day                Physical Exam   Constitutional: He is oriented to person, place, and time. He appears well-developed and well-nourished.   Cachectic   HENT:   Head: Normocephalic and atraumatic.   Eyes: Pupils are equal, round, and reactive to light. Conjunctivae and EOM are normal.   Neck: No JVD present.   Cardiovascular: Normal rate, regular rhythm, normal heart sounds and intact distal pulses. Exam reveals no gallop and no friction rub.   No murmur heard.  Pulmonary/Chest: Effort normal and breath sounds normal. No stridor. He has no rales.   Upper airway phlegm audible   Abdominal: Soft. Bowel sounds are normal. He exhibits no distension and no mass. There is no tenderness. There is no guarding.   Musculoskeletal: He exhibits no edema, tenderness or deformity.   Neurological: He is alert and oriented to " person, place, and time.   Skin: Skin is warm and dry. No rash noted. No erythema.   Psychiatric:   Able to comprehend where he is, but is slightly aggressive to questioning and appears to have poor insight       Significant Labs:   CMP   Recent Labs   Lab 07/12/19 0314 07/13/19  0400    134*   K 4.0 4.0    101   CO2 23 25   * 112*   BUN 23 20   CREATININE 1.1 1.0   CALCIUM 8.8 8.6*   PROT 6.1 6.1   ALBUMIN 2.1* 2.1*   BILITOT 0.4 0.4   ALKPHOS 64 78   AST 32 34   ALT 12 21   ANIONGAP 9 8   ESTGFRAFRICA >60.0 >60.0   EGFRNONAA >60.0 >60.0   , CBC   Recent Labs   Lab 07/12/19 0314 07/13/19  0400   WBC 6.68 6.44   HGB 10.3* 9.9*   HCT 32.4* 31.6*   * 128*    and Troponin No results for input(s): TROPONINI in the last 48 hours.    Significant Imaging:   ECHO:  · Technically difficult study.  · Severely decreased left ventricular systolic function. The estimated ejection fraction is 25%  · Global hypokinetic wall motion.  · Moderate left atrial enlargement.  · Left ventricular diastolic dysfunction.  · Moderately reduced right ventricular systolic function.  · The aortic root is mildly dilated.

## 2019-07-13 NOTE — PROGRESS NOTES
"[facility]  Cardiology  Progress Note    Patient Name: Yogesh Lima  MRN: [id number]  Admission Date: 7/7/2019  Hospital Length of Stay: 6 days  Code Status: Prior   Attending Physician: Hilton Hanson MD   Primary Care Physician: [facility]  Expected Discharge Date: 7/17/2019  Principal Problem:AAA (abdominal aortic aneurysm) without rupture    Subjective:     Hospital Course:   No notes on file    Interval History: Patient states he feels "restless". Otherwise no events, doing well, stable vitals.     ROS  Objective:     Vital Signs (Most Recent):  Temp: 98 °F (36.7 °C) (07/13/19 0715)  Pulse: (!) 114 (07/13/19 0830)  Resp: (!) 28 (07/13/19 0830)  BP: 122/68 (07/13/19 0830)  SpO2: 96 % (07/13/19 0830) Vital Signs (24h Range):  Temp:  [97.4 °F (36.3 °C)-99.4 °F (37.4 °C)] 98 °F (36.7 °C)  Pulse:  [] 114  Resp:  [18-44] 28  SpO2:  [94 %-100 %] 96 %  BP: ()/(50-74) 122/68  Arterial Line BP: ()/(40-73) 143/67     Weight: 62.1 kg (137 lb)  Body mass index is 21.46 kg/m².     SpO2: 96 %  O2 Device (Oxygen Therapy): room air      Intake/Output Summary (Last 24 hours) at 7/13/2019 0840  Last data filed at 7/13/2019 0800  Gross per 24 hour   Intake 1623 ml   Output 1845 ml   Net -222 ml       Lines/Drains/Airways     Drain                 Urethral Catheter 07/12/19 1400 less than 1 day          Peripheral Intravenous Line                 Peripheral IV - Single Lumen 07/08/19 0029 20 G Right Forearm 5 days         Peripheral IV - Single Lumen 07/12/19 1300 20 G Left Antecubital less than 1 day                Physical Exam   Constitutional: He is oriented to person, place, and time. He appears well-developed and well-nourished.   Cachectic   HENT:   Head: Normocephalic and atraumatic.   Eyes: Pupils are equal, round, and reactive to light. Conjunctivae and EOM are normal.   Neck: No JVD present.   Cardiovascular: Normal rate, regular rhythm, normal heart sounds and " intact distal pulses. Exam reveals no gallop and no friction rub.   No murmur heard.  Pulmonary/Chest: Effort normal and breath sounds normal. No stridor. He has no rales.   Upper airway phlegm audible   Abdominal: Soft. Bowel sounds are normal. He exhibits no distension and no mass. There is no tenderness. There is no guarding.   Musculoskeletal: He exhibits no edema, tenderness or deformity.   Neurological: He is alert and oriented to person, place, and time.   Skin: Skin is warm and dry. No rash noted. No erythema.   Psychiatric:   Able to comprehend where he is, but is slightly aggressive to questioning and appears to have poor insight       Significant Labs:   CMP   Recent Labs   Lab 07/12/19  0314 07/13/19  0400    134*   K 4.0 4.0    101   CO2 23 25   * 112*   BUN 23 20   CREATININE 1.1 1.0   CALCIUM 8.8 8.6*   PROT 6.1 6.1   ALBUMIN 2.1* 2.1*   BILITOT 0.4 0.4   ALKPHOS 64 78   AST 32 34   ALT 12 21   ANIONGAP 9 8   ESTGFRAFRICA >60.0 >60.0   EGFRNONAA >60.0 >60.0   , CBC   Recent Labs   Lab 07/12/19  0314 07/13/19  0400   WBC 6.68 6.44   HGB 10.3* 9.9*   HCT 32.4* 31.6*   * 128*    and Troponin No results for input(s): TROPONINI in the last 48 hours.    Significant Imaging:   ECHO:  · Technically difficult study.  · Severely decreased left ventricular systolic function. The estimated ejection fraction is 25%  · Global hypokinetic wall motion.  · Moderate left atrial enlargement.  · Left ventricular diastolic dysfunction.  · Moderately reduced right ventricular systolic function.  · The aortic root is mildly dilated.    Assessment and Plan:       Hypotension  Unclear etiology, low suspicion for cardiogenic given no evidence of end organ dysfunction, cool extremities, dynamic changes in wall motion  - If patient again exhibits shock, would recommend obtaining central venous access and obtaining CVP, mVO2, David calculation to determine etiology of shock    NSTEMI (non-ST elevated  myocardial infarction)  Unclear etiology, possible 2/2 to demand given hypotension, currently appears hypovolemic. EKG changes (anterolateral TWIs), trop elevation noted.    - s/p , ordered for ASA 81 qd  - s/p heparin gtt  - Continue plavix  - continue high intenity statin    Acute on chronic combined systolic and diastolic congestive heart failure  64 y/o M with history of CAD s/p CABG 1993, HFrEF (presumed 2/2 ICM last EF 42%), PVD, COPD, AAA presenting with abdominal pain s/p EVAR with course complicated by hypotension (possible shock) in setting of decompensated systolic heart failure and NSTEMI. Additionally with intermittent hypotension without clear markers of cardiogenic shock (I.e. BRITNEY, transaminitis, cool extremities, etc). 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 Currently BP correlates closely with HR, suggesting euvolemia    RECS  - Patient euvolemic, holding additional diuresis for now  - Strict I/Os  - change amlodipine 5 to losartan 12.5  - can continue coreg at this time  - appears dry, can consider 1 bolus 250cc for any hypotension, please call cardiology if considering higher volumes        VTE Risk Mitigation (From admission, onward)        Ordered     heparin (porcine) injection 5,000 Units  Every 8 hours      07/10/19 0959     IP VTE HIGH RISK PATIENT  Once      07/07/19 4411          Xavier Alvarez MD  Cardiology  [facility]

## 2019-07-13 NOTE — PT/OT/SLP EVAL
Occupational Therapy   Evaluation    Name: Yogesh Lima  MRN: [id number]  Admitting Diagnosis:  AAA (abdominal aortic aneurysm) without rupture 6 Days Post-Op  Pt t/f to [facility] with emergent vascular intervention. Pt s/p EVAR  Recommendations:     Discharge Recommendations: nursing facility, skilled pending progress     Assessment:     Yogesh Lima is a 64 y.o. male with a medical diagnosis of AAA (abdominal aortic aneurysm) without rupture.  Performance deficits affecting function: weakness, gait instability, impaired balance, impaired endurance, impaired self care skills, impaired functional mobilty.  Pt tolerated session fairly well. Limited mobility completed due to decline in pressure once seated in chair. Pt lives alone and may need further inpatient therapy prior to d/c home to allow for increased safety and independence. OT to continue to assess d/c rec as therapy sessions continue.     Rehab Prognosis: Good; patient would benefit from acute skilled OT services to address these deficits and reach maximum level of function.       Plan:     Patient to be seen 4 x/week to address the above listed problems via self-care/home management, therapeutic activities, therapeutic exercises  · Plan of Care Expires:    · Plan of Care Reviewed with: patient    Subjective     Pt stated his name.     Occupational Profile:  Pt reports he lives alone in [place] [facility] with 3 CLIVE. Pt reports he was independent PTA and reports he has family nearby but they can provide limited assistance.     Pain/Comfort:  · Pain Rating 1: 0/10    Patients cultural, spiritual, Scientologist conflicts given the current situation: no    Objective:     Communicated with: nsg prior to session.    Pt found reclined in chair with sitter present.   Nsg requested therapy to return as she had just provided anxiety medication. OT returned in one hour and pt found sleeping in chair.     General Precautions: Standard,   fall      Occupational Performance:    Functional  Mobility/Transfers:  Pt transitioned from reclining position in chair to sitting upright in chair. BP noted to decrease with nsg arriving to room. Nsg recommend for pt to remain in chair rather than standing due to labile BP.     Activities of Daily Living:  · Feeding: independent  · G/H: seated in chair with set-up  · LE dressing MOD A     Cognitive/Visual Perceptual  Pt awake, but lethargic. Pt following simple commands. Pt able to state name only and after several times questioning.    Physical Exam  Pt is right hand dominant and demo WFL UE strength/ROM, coordination and sensation.     AMPAC 6 Click ADL:  AMPAC Total Score: 13    Treatment & Education:  Increased time provided to wake pt from sleeping. Education provided re: OT POC and safety with functional mobility/ADL skills. Orientation provided. Delayed verbal responses noted with flat affect and limited eye contact.     Education:    Patient left up in chair with all lines intact, call button in reach, chair alarm on, nsg notified and sitter present    GOALS:   Multidisciplinary Problems     Occupational Therapy Goals        Problem: Occupational Therapy Goal    Goal Priority Disciplines Outcome Interventions   Occupational Therapy Goal     OT, PT/OT     Description:  Goals to be met by: 7/21/19    Patient will increase functional independence with ADLs by performing:    UE Dressing with Supervision.  LE Dressing with Supervision.  Grooming while standing with Supervision.  Toileting from toilet with Supervision for hygiene and clothing management.   Pt to complete transfer to bed, chair and commode with supervision.                       History:     Past Medical History:   Diagnosis Date    Coronary artery disease     Hyperlipidemia     Hypertension        Past Surgical History:   Procedure Laterality Date    REPAIR-ANEURYSM-ABDOMINAL AORTIC-ENDOVASCULAR (AAA) Bilateral 7/7/2019    Performed by Hilton Hanson MD at [facility]       Time  Tracking:     OT Date of Treatment: 07/13/19  OT Start Time: 1100  OT Stop Time: 1120  OT Total Time (min): 20 min    Billable Minutes:Evaluation 20    RAMSEY Johnson  7/13/2019

## 2019-07-13 NOTE — SUBJECTIVE & OBJECTIVE
Medications:  Continuous Infusions:    Scheduled Meds:   albuterol-ipratropium  3 mL Nebulization Q4H    amoxicillin-clavulanate 875-125mg  1 tablet Oral Q12H    aspirin  81 mg Per OG tube Daily    atorvastatin  80 mg Per OG tube Daily    carvedilol  3.125 mg Oral BID    chlordiazepoxide  10 mg Oral TID    Followed by    [START ON 7/14/2019] chlordiazepoxide  5 mg Oral TID    Followed by    [START ON 7/15/2019] chlordiazepoxide  5 mg Oral BID    Followed by    [START ON 7/16/2019] chlordiazepoxide  5 mg Oral Once    chlordiazepoxide  10 mg Oral Once    clopidogrel  75 mg Oral Daily    folic acid  1 mg Oral Daily    heparin (porcine)  5,000 Units Subcutaneous Q8H    losartan  12.5 mg Oral Daily    multivitamin  1 tablet Oral Daily    nicotine  1 patch Transdermal Daily    tamsulosin  0.8 mg Oral Daily    thiamine  100 mg Oral Daily    traZODone  100 mg Oral QHS     PRN Meds:acetaminophen, Dextrose 10% Bolus, Dextrose 10% Bolus, fentaNYL, haloperidol lactate, hydrALAZINE, HYDROcodone-acetaminophen, HYDROcodone-acetaminophen, magnesium sulfate IVPB, magnesium sulfate IVPB, metoprolol, potassium chloride in water **AND** potassium chloride in water **AND** potassium chloride in water, traZODone     Objective:     Vital Signs (Most Recent):  Temp: 98.3 °F (36.8 °C) (07/13/19 1100)  Pulse: 101 (07/13/19 1215)  Resp: (!) 34 (07/13/19 1215)  BP: (!) 102/50 (07/13/19 1200)  SpO2: 100 % (07/13/19 1215) Vital Signs (24h Range):  Temp:  [97.4 °F (36.3 °C)-99.4 °F (37.4 °C)] 98.3 °F (36.8 °C)  Pulse:  [] 101  Resp:  [18-44] 34  SpO2:  [89 %-100 %] 100 %  BP: ()/(50-74) 102/50  Arterial Line BP: ()/(39-73) 108/52     Date 07/13/19 0700 - 07/14/19 0659   Shift 4015-7190 0696-4253 9007-0506 24 Hour Total   INTAKE   P.O. 310   310   Shift Total(mL/kg) 310(5)   310(5)   OUTPUT   Urine(mL/kg/hr) 475   475   Shift Total(mL/kg) 475(7.6)   475(7.6)   Weight (kg) 62.1 62.1 62.1 62.1       Physical  Exam   Constitutional: He is oriented to person, place, and time. He appears cachectic.   Disheveled    HENT:   Head: Normocephalic and atraumatic.   Nose: Nose normal.   Eyes: Pupils are equal, round, and reactive to light. Conjunctivae and EOM are normal. No scleral icterus.   Neck: Normal range of motion. No thyromegaly present.   Cardiovascular: Normal rate and regular rhythm. Exam reveals no gallop and no friction rub.   No murmur heard.  Pulses:       Radial pulses are 2+ on the right side, and 2+ on the left side.        Femoral pulses are 2+ on the right side, and 2+ on the left side.  PTs dopplerable in bilateral legs  DP with weak monophasic signal in right leg, non-dopplerable in left   Pulmonary/Chest:   On nasal cannula    Abdominal: Soft. He exhibits no distension. Mass:    There is no tenderness.   Genitourinary:   Genitourinary Comments: Bilateral groin sites c/d/i; no evidence of hematoma    Musculoskeletal: Normal range of motion.   Lymphadenopathy:     He has no cervical adenopathy.   Neurological: He is alert and oriented to person, place, and time.   Skin: Skin is warm and dry. No rash noted.       Significant Labs:  CBC:   Recent Labs   Lab 07/13/19  0400   WBC 6.44   RBC 3.83*   HGB 9.9*   HCT 31.6*   *   MCV 83   MCH 25.8*   MCHC 31.3*     CMP:   Recent Labs   Lab 07/13/19  0400   *   CALCIUM 8.6*   ALBUMIN 2.1*   PROT 6.1   *   K 4.0   CO2 25      BUN 20   CREATININE 1.0   ALKPHOS 78   ALT 21   AST 34   BILITOT 0.4     Coagulation: No results for input(s): LABPROT, INR, APTT in the last 48 hours.    Significant Diagnostics:  I have reviewed all pertinent imaging results/findings within the past 24 hours.

## 2019-07-13 NOTE — PLAN OF CARE
Problem: Physical Therapy Goal  Goal: Physical Therapy Goal  Goals to be met by: 2019     Patient will increase functional independence with mobility by performin. Supine to sit with Set-up [place]  2. Sit to stand transfer with Contact Guard Assistance  3. Bed to chair transfer with Contact Guard Assistance using LRAD  4. Gait  x 25 feet with Contact Guard Assistance using LRAD.   5. Ascend/descend 3 stair with left Handrails Minimal Assistance using LRAD.     Outcome: Ongoing (interventions implemented as appropriate)  Pt evaluated and appropriate goals established.

## 2019-07-13 NOTE — PROGRESS NOTES
[facility]  Critical Care - Surgery  Progress Note    Patient Name: Yogesh Lima  MRN: [id number]  Admission Date: 7/7/2019  Hospital Length of Stay: 6 days  Code Status: Prior  Attending Provider: Hilton Hanson MD  Primary Care Provider: [facility]   Principal Problem: AAA (abdominal aortic aneurysm) without rupture    Subjective:     Hospital/ICU Course:  63-year-old male with history of HTN, HLD, current smoker, CAD s/p multi-vessel CABG (1993), heart failure, COPD, and known AAA (discovered April 2019)  who presented as transfer from [facility] given symptomatic enlarging AAA. He presented to OSH this evening given abdominal pain of several days, worsening in nature, worst in LLQ and radiating to his back. He also was complaining of bilateral lower extremity edema, shortness of breath and dyspnea on exertion. BNP was 16,200. CT abdomen was performed given abdominal pain and history of AAA and his aneurysm was noted to be enlarged to 6.1cm from previous diameter of 4.8cm. Given this he was transferred to [facility] for emergent vascular surgery intervention. He's now s/p EVAR and remains intubated off pressors.     He became increasingly agitated the night of 7/9 and the morning of 7/10.. He was given ativan, haldol and eventually restarted on Precedex. He was also put in wrist restraints. He was weaned off of .      Interval History/Significant Events: Feeling restless this morning. Otherwise, no acute events.    Follow-up For: Procedure(s) (LRB):  REPAIR-ANEURYSM-ABDOMINAL AORTIC-ENDOVASCULAR (AAA) (Bilateral)    Post-Operative Day: 6 Days Post-Op    Objective:     Vital Signs (Most Recent):  Temp: 97.9 °F (36.6 °C) (07/13/19 1500)  Pulse: (!) 117 (07/13/19 1745)  Resp: (!) 28 (07/13/19 1745)  BP: (!) 119/57 (07/13/19 1730)  SpO2: 98 % (07/13/19 1745) Vital Signs (24h Range):  Temp:  [97.4 °F (36.3 °C)-98.4 °F (36.9 °C)] 97.9 °F (36.6 °C)  Pulse:  [] 117  Resp:  [18-44]  28  SpO2:  [89 %-100 %] 98 %  BP: ()/(50-72) 119/57  Arterial Line BP: ()/(39-72) 116/51     Weight: 62.1 kg (137 lb)  Body mass index is 21.46 kg/m².      Intake/Output Summary (Last 24 hours) at 7/13/2019 1820  Last data filed at 7/13/2019 1700  Gross per 24 hour   Intake 1155.12 ml   Output 1310 ml   Net -154.88 ml       Physical Exam   Constitutional: He is oriented to person, place, and time. He appears cachectic.   Disheveled    HENT:   Head: Normocephalic and atraumatic.   Nose: Nose normal.   Eyes: Pupils are equal, round, and reactive to light. Conjunctivae and EOM are normal. No scleral icterus.   Neck: Normal range of motion. No thyromegaly present.   Cardiovascular: Normal rate and regular rhythm. Exam reveals no gallop and no friction rub.   No murmur heard.  Pulses:       Radial pulses are 2+ on the right side, and 2+ on the left side.        Femoral pulses are 2+ on the right side, and 2+ on the left side.  PTs dopplerable in bilateral legs  DP with weak monophasic signal in right leg, non-dopplerable in left   Pulmonary/Chest:   On nasal cannula    Abdominal: Soft. He exhibits no distension. Mass:    There is no tenderness.   Genitourinary:   Genitourinary Comments: Bilateral groin sites c/d/i; no evidence of hematoma    Musculoskeletal: Normal range of motion.   Lymphadenopathy:     He has no cervical adenopathy.   Neurological: He is alert and oriented to person, place, and time.   Skin: Skin is warm and dry. No rash noted.       Lines/Drains/Airways     Drain                 Urethral Catheter 07/12/19 1400 1 day          Peripheral Intravenous Line                 Peripheral IV - Single Lumen 07/08/19 0029 20 G Right Forearm 5 days         Peripheral IV - Single Lumen 07/12/19 1300 20 G Left Antecubital 1 day                Significant Labs:    CBC/Anemia Profile:  Recent Labs   Lab 07/12/19  0314 07/13/19  0400   WBC 6.68 6.44   HGB 10.3* 9.9*   HCT 32.4* 31.6*   * 128*   MCV  80* 83   RDW 15.9* 15.9*        Chemistries:  Recent Labs   Lab 07/12/19  0314 07/13/19  0400    134*   K 4.0 4.0    101   CO2 23 25   BUN 23 20   CREATININE 1.1 1.0   CALCIUM 8.8 8.6*   ALBUMIN 2.1* 2.1*   PROT 6.1 6.1   BILITOT 0.4 0.4   ALKPHOS 64 78   ALT 12 21   AST 32 34   MG 2.2 2.3   PHOS 3.2 2.3*       All pertinent labs within the past 24 hours have been reviewed.    Significant Imaging:  I have reviewed all pertinent imaging results/findings within the past 24 hours.    Assessment/Plan:     Aneurysm of infrarenal abdominal aorta  63-year-old male with acute on chronic CHF exacerbation found to have a AAA enlarged to greater than 6cm from previous now s/p EVAR.     Neuro:  -Weaned off of propofol and fentanyl  -started on nicotine patch  -Librium taper  -gave one time lorazepam this morning for restlessness    Cards:  S/p EVAR  HDS  Mild tachycardia  CHF exacerbation resolved  -Echo 7/8: 25% EF  -keep systolics less than 140 and HR normal  -Off pressors  -clopidogrel/ASA    Resp:  -Extubated and on room air  -moraxella PNA on BAL    FENGI  -regular diet  -replace lytes as needed  -GI ppx    Heme:  CBC stable. Doing well.   -trend H/H  -DVT ppx    Renal:  Baseline CKD with admitting creatinine of 1.4.   -trend renal function, stable    Endo:  Blood sugars stable. No history of diabetes.   -monitor blood sugar.     ID:  -Resp cx positve for M. Catarrhalis   -Started on Augmentin  -trend white count    Dispo: Stepdown orders in      Stepdown     Critical care was time spent personally by me on the following activities: development of treatment plan with patient or surrogate and bedside caregivers, discussions with consultants, evaluation of patient's response to treatment, examination of patient, ordering and performing treatments and interventions, ordering and review of laboratory studies, ordering and review of radiographic studies, pulse oximetry, re-evaluation of patient's condition.  This  critical care time did not overlap with that of any other provider or involve time for any procedures.     KALEN Sheffield MD  Critical Care - Surgery  [facility]

## 2019-07-13 NOTE — ASSESSMENT & PLAN NOTE
63-year-old male with acute on chronic CHF exacerbation found to have a AAA enlarged to greater than 6cm from previous now s/p EVAR.     Neuro:  -Weaned off of propofol and fentanyl  -started on nicotine patch  -Librium taper  -gave one time lorazepam this morning for restlessness    Cards:  S/p EVAR  HDS  Mild tachycardia  CHF exacerbation resolved  -Echo 7/8: 25% EF  -keep systolics less than 140 and HR normal  -Off pressors  -clopidogrel/ASA    Resp:  -Extubated and on room air  -moraxella PNA on BAL    FENGI  -regular diet  -replace lytes as needed  -GI ppx    Heme:  CBC stable. Doing well.   -trend H/H  -DVT ppx    Renal:  Baseline CKD with admitting creatinine of 1.4.   -trend renal function, stable    Endo:  Blood sugars stable. No history of diabetes.   -monitor blood sugar.     ID:  -Resp cx positve for M. Catarrhalis   -Started on Augmentin  -trend white count    Dispo: Stepdown orders in

## 2019-07-13 NOTE — PT/OT/SLP EVAL
Physical Therapy Evaluation    Patient Name:  Yogesh Lima   MRN:  [id number]    Recommendations:     Discharge Recommendations:  nursing facility, skilled   Discharge Equipment Recommendations: (TBD)   Barriers to discharge: Decreased caregiver support    Assessment:     Yogesh Lima is a 64 y.o. male admitted with a medical diagnosis of AAA (abdominal aortic aneurysm) without rupture.  He presents with the following impairments/functional limitations:  weakness, impaired endurance, impaired functional mobilty, gait instability, impaired balance, impaired cardiopulmonary response to activity. Pt required increased effort to arouse pt with verbal and tactile stimuli. Pt sitting up in chair, unable to assess standing or gait mobility 2/2 hold per RN 2/2 decreased blood pressure. Pt required significant encouragement to answer questions and lift UE/LE against gravity. At this time pt would benefit from continued skilled therapy intervention at [facility] nursing facility to progress toward more independent mobility. Pt would continue to benefit from acute skilled therapy intervention to address deficits and progress toward prior level of function.       Rehab Prognosis: Good; patient would benefit from acute skilled PT services to address these deficits and reach maximum level of function.    Recent Surgery: Procedure(s) (LRB):  REPAIR-ANEURYSM-ABDOMINAL AORTIC-ENDOVASCULAR (AAA) (Bilateral) 6 Days Post-Op    Plan:     During this hospitalization, patient to be seen 4 x/week to address the identified rehab impairments via gait training, therapeutic activities, therapeutic exercises, neuromuscular re-education and progress toward the following goals:    · Plan of Care Expires:  08/13/19    Subjective     Chief Complaint: Pt with no complaints at this time   Patient/Family Comments/goals: to get better and return home   Pain/Comfort:  · Pain Rating 1: 0/10  · Pain Rating Post-Intervention 1: 0/10    Patients cultural,  "spiritual, Sikh conflicts given the current situation: no    Living Environment:  Pt lives alone in a trailer with 3 CLIVE with L HR.   Prior to admission, patients level of function was independent with mobility and ADLs.  Equipment used at home: none.  DME owned (not currently used): none.  Upon discharge, patient will have assistance from family, possibly but pt states "they have their own things going on".    Objective:     Communicated with RN prior to session.  Patient found up in chair , sitter present, with pulse ox (continuous), telemetry, blood pressure cuff, arreola catheter, peripheral IV  upon PT entry to room.    General Precautions: Standard, fall   Orthopedic Precautions:N/A   Braces: N/A     Exams:  · Cognitive Exam:  Patient is very lethargic, oriented to self only, difficult to arouse, required multiple repetition of instruction, able to follow one step commands  · Gross Motor Coordination:  WFL  · RLE ROM: WFL  · RLE Strength: grossly 3/5  · LLE ROM: WFL  · LLE Strength: grossly 3/5     Functional Mobility:  · Not assessed, pt sitting up in chair upon arrival. RN advised pt not to stand or perform mobility out of chair 2/2 current hypotension       Therapeutic Activities and Exercises:   Pt educated on role of PT/POC. Pt verbalized understanding.   Pt encouraged to only perform OOB mobility with assistance from nursing/therapy. Pt agreeable.   Pt educated on seated exercises to perform while seated in chair. Exercises included bilateral LAQ, marching, ankle DF/PF      AM-PAC 6 CLICK MOBILITY  Total Score:12     Patient left up in chair with all lines intact, call button in reach and RN  present.sitter present.     GOALS:   Multidisciplinary Problems     Physical Therapy Goals        Problem: Physical Therapy Goal    Goal Priority Disciplines Outcome Goal Variances Interventions   Physical Therapy Goal     PT, PT/OT Ongoing (interventions implemented as appropriate)     Description:  Goals to be " met by: 2019     Patient will increase functional independence with mobility by performin. Supine to sit with Set-up [place]  2. Sit to stand transfer with Contact Guard Assistance  3. Bed to chair transfer with Contact Guard Assistance using LRAD  4. Gait  x 25 feet with Contact Guard Assistance using LRAD.   5. Ascend/descend 3 stair with left Handrails Minimal Assistance using LRAD.                       History:     Past Medical History:   Diagnosis Date    Coronary artery disease     Hyperlipidemia     Hypertension        Past Surgical History:   Procedure Laterality Date    REPAIR-ANEURYSM-ABDOMINAL AORTIC-ENDOVASCULAR (AAA) Bilateral 2019    Performed by Hilton Hanson MD at [facility] OR [address] [place] [address]       Time Tracking:     PT Received On: 19  PT Start Time: 1058     PT Stop Time: 1109  PT Total Time (min): 11 min     Billable Minutes: Evaluation 11 mins      Layne Woodall, PT  2019

## 2019-07-13 NOTE — ASSESSMENT & PLAN NOTE
64 y/o M with history of CAD s/p CABG 1993, HFrEF (presumed 2/2 ICM last EF 42%), PVD, COPD, AAA presenting with abdominal pain s/p EVAR with course complicated by hypotension (possible shock) in setting of decompensated systolic heart failure and NSTEMI. Additionally with intermittent hypotension without clear markers of cardiogenic shock (I.e. BRITNEY, transaminitis, cool extremities, etc). 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 Currently BP correlates closely with HR, suggesting euvolemia    RECS  - Patient euvolemic, holding additional diuresis for now  - Strict I/Os  - change amlodipine 5 to losartan 12.5  - can continue coreg at this time  - appears dry, can consider 1 bolus 250cc for any hypotension, please call cardiology if considering higher volumes

## 2019-07-13 NOTE — ASSESSMENT & PLAN NOTE
62yo M with multiple medical co-morbidities who presented as transfer from OSH given symptomatic, enlarging AAA now s/p emergent EVAR on 7/7    Neuro: Intermittent agitation. Librium taper started 7/10. Precedex weaned. consider sitter for stepdown   Resp: Maintaining sats on nasal cannula, wean O2 as tolerated, duonebs q4 in setting of known COPD, ABGs prn, continuous pulse ox  CV: TTE with evidence of CHF, EF 25%, BNP significantly elevated when checked, Now off dobutamine. Continue daily ASA, statin; SBP < 140, HR < 100; repeat TTE today, consider stepdown with cardiology team to CSU. Per cardiology recs, stopped amlodipine and started losartan 12.5 qd.  FEN/GI: reg diet, replete lytes   Renal:Monitor BUN/Cr, monitor UOP  Heme/ID: H/H stable, WBC 6, pan cultured 7/9, resp cultures with moraxella - augmentin per ID for 5 days  Endo: POCT blood glucose, no current issues, continue to monitor  Msk: No issues, continue to monitor    Ppx: SQH  Dispo: stepdown with sitter, PT/OT/SW. Cousin reportedly has power of  and wants him to go to nursing home, but patient states he wants to go back to his trailer.

## 2019-07-13 NOTE — PLAN OF CARE
Problem: Occupational Therapy Goal  Goal: Occupational Therapy Goal  Goals to be met by: 7/21/19    Patient will increase functional independence with ADLs by performing:    UE Dressing with Supervision.  LE Dressing with Supervision.  Grooming while standing with Supervision.  Toileting from toilet with Supervision for hygiene and clothing management.   Pt to complete transfer to bed, chair and commode with supervision.     Goals and POC established this date.

## 2019-07-14 NOTE — SUBJECTIVE & OBJECTIVE
Interval History: Sleeping comfortably no issues today    Review of Systems   Unable to perform ROS: patient nonverbal     Objective:     Vital Signs (Most Recent):  Temp: 97.6 °F (36.4 °C) (07/14/19 0700)  Pulse: 104 (07/14/19 1100)  Resp: (!) 30 (07/14/19 1100)  BP: 122/65 (07/14/19 1100)  SpO2: 100 % (07/14/19 1100) Vital Signs (24h Range):  Temp:  [97.6 °F (36.4 °C)-99.5 °F (37.5 °C)] 97.6 °F (36.4 °C)  Pulse:  [] 104  Resp:  [16-38] 30  SpO2:  [92 %-100 %] 100 %  BP: ()/(47-67) 122/65  Arterial Line BP: ()/(41-65) 123/59     Weight: 62.1 kg (137 lb)  Body mass index is 21.46 kg/m².     SpO2: 100 %  O2 Device (Oxygen Therapy): room air      Intake/Output Summary (Last 24 hours) at 7/14/2019 1146  Last data filed at 7/14/2019 1100  Gross per 24 hour   Intake 775.12 ml   Output 2045 ml   Net -1269.88 ml       Lines/Drains/Airways     Drain                 Urethral Catheter 07/12/19 1400 1 day          Peripheral Intravenous Line                 Peripheral IV - Single Lumen 20 G Left Forearm -- days         Peripheral IV - Single Lumen 07/13/19 2300 22 G Right Forearm less than 1 day                Physical Exam   Constitutional: He is oriented to person, place, and time. He appears well-developed and well-nourished.   Cachectic   HENT:   Head: Normocephalic and atraumatic.   Eyes: Pupils are equal, round, and reactive to light. Conjunctivae and EOM are normal.   Neck: No JVD present.   Cardiovascular: Normal rate, regular rhythm, normal heart sounds and intact distal pulses. Exam reveals no gallop and no friction rub.   No murmur heard.  Pulmonary/Chest: Effort normal and breath sounds normal. No stridor. He has no rales.   Upper airway phlegm audible   Abdominal: Soft. Bowel sounds are normal. He exhibits no distension and no mass. There is no tenderness. There is no guarding.   Musculoskeletal: He exhibits no edema, tenderness or deformity.   Neurological: He is alert and oriented to person,  place, and time.   Skin: Skin is warm and dry. No rash noted. No erythema.   Psychiatric:   Sleeping       Significant Labs:   BMP:   Recent Labs   Lab 07/13/19  0400 07/14/19  0400   * 94   * 134*   K 4.0 3.8    103   CO2 25 24   BUN 20 23   CREATININE 1.0 1.0   CALCIUM 8.6* 8.4*   MG 2.3 2.5       Significant Imaging: Echocardiogram:   Transthoracic echo (TTE) complete (Cupid Only):   Results for orders placed or performed during the hospital encounter of 07/07/19   Transthoracic echo (TTE) 2D with Color Flow   Result Value Ref Range    Ascending aorta 3.07 cm    STJ 3.14 cm    AV mean gradient 2 mmHg    Ao peak aaliyah 0.90 m/s    Ao VTI 12.22 cm    IVS 1.01 0.6 - 1.1 cm    LA size 4.50 cm    Left Atrium Major Axis 5.88 cm    Left Atrium Minor Axis 5.21 cm    LVIDD 5.76 3.5 - 6.0 cm    LVIDS 4.88 (A) 2.1 - 4.0 cm    LVOT diameter 2.22 cm    LVOT peak VTI 10.53 cm    PW 0.62 0.6 - 1.1 cm    RA Major Axis 5.72 cm    RA Width 3.28 cm    RVDD 2.62 cm    Sinus 3.18 cm    TAPSE 1.33 cm    TDI LATERAL 0.17 m/s    TDI SEPTAL 0.09 m/s    LA WIDTH 4.05 cm    LV Diastolic Volume 164.01 mL    LV Systolic Volume 111.91 mL    LVOT peak aaliyah 0.74 m/s    FS 15 %    LA volume 85.59 cm3    LV mass 177.38 g    Left Ventricle Relative Wall Thickness 0.22 cm    AV valve area 3.33 cm2    AV Velocity Ratio 0.82     AV index (prosthetic) 0.86     Mean e' 0.13 m/s    LVOT area 3.9 cm2    LVOT stroke volume 40.74 cm3    AV peak gradient 3 mmHg    LV Systolic Volume Index 64.8 mL/m2    LV Diastolic Volume Index 94.97 mL/m2    LA Volume Index 49.6 mL/m2    LV Mass Index 103 g/m2    BSA 1.72 m2    Narrative    · Severely decreased left ventricular systolic function with evidence of   basal inferior akinesis. The estimated ejection fraction is 25%.  · Mildly to moderately reduced right ventricular systolic function.  · Left ventricular diastolic dysfunction.  · Severe left atrial enlargement.  · Mild mitral regurgitation.  ·  Mechanically ventilated; cannot use inferior caval vein diameter to   estimate central venous pressure.

## 2019-07-14 NOTE — SUBJECTIVE & OBJECTIVE
Interval History/Significant Events: NAEO.    Follow-up For: Procedure(s) (LRB):  REPAIR-ANEURYSM-ABDOMINAL AORTIC-ENDOVASCULAR (AAA) (Bilateral)    Post-Operative Day: 7 Days Post-Op    Objective:     Vital Signs (Most Recent):  Temp: 97.6 °F (36.4 °C) (07/14/19 0700)  Pulse: 82 (07/14/19 0730)  Resp: 20 (07/14/19 0730)  BP: (!) 91/52 (07/14/19 0700)  SpO2: 100 % (07/14/19 0730) Vital Signs (24h Range):  Temp:  [97.6 °F (36.4 °C)-99.5 °F (37.5 °C)] 97.6 °F (36.4 °C)  Pulse:  [] 82  Resp:  [16-38] 20  SpO2:  [89 %-100 %] 100 %  BP: ()/(47-72) 91/52  Arterial Line BP: ()/(39-72) 107/50     Weight: 62.1 kg (137 lb)  Body mass index is 21.46 kg/m².      Intake/Output Summary (Last 24 hours) at 7/14/2019 0745  Last data filed at 7/14/2019 0600  Gross per 24 hour   Intake 965.12 ml   Output 1915 ml   Net -949.88 ml       Physical Exam   Constitutional: He appears well-developed and well-nourished.   HENT:   Head: Normocephalic and atraumatic.   Cardiovascular: Normal rate and regular rhythm.   Pulmonary/Chest: Effort normal and breath sounds normal.   Abdominal: Soft. Bowel sounds are normal.   Genitourinary:   Genitourinary Comments: Mari in place   Neurological:   Drowsy   Skin: Skin is warm and dry.       Vents:  Vent Mode: A/C (07/09/19 0927)  Ventilator Initiated: Yes (07/07/19 2318)  Set Rate: 16 bmp (07/09/19 0927)  Vt Set: 380 mL (07/09/19 0927)  PEEP/CPAP: 5 cmH20 (07/09/19 0927)  Oxygen Concentration (%): 24 (07/12/19 0846)  Peak Airway Pressure: 20 cmH2O (07/09/19 0927)  Plateau Pressure: 12 cmH20 (07/09/19 0927)  Total Ve: 3.77 mL (07/09/19 0927)  F/VT Ratio<105 (RSBI): 1461.54 (07/09/19 0927)    Lines/Drains/Airways     Drain                 Urethral Catheter 07/12/19 1400 1 day          Peripheral Intravenous Line                 Peripheral IV - Single Lumen 20 G Left Forearm -- days         Peripheral IV - Single Lumen 07/13/19 2300 22 G Right Forearm less than 1 day                 Significant Labs:    CBC/Anemia Profile:  Recent Labs   Lab 07/13/19  0400 07/14/19  0400   WBC 6.44 5.82   HGB 9.9* 9.4*   HCT 31.6* 29.4*   * 143*   MCV 83 80*   RDW 15.9* 15.8*        Chemistries:  Recent Labs   Lab 07/13/19  0400 07/14/19  0400   * 134*   K 4.0 3.8    103   CO2 25 24   BUN 20 23   CREATININE 1.0 1.0   CALCIUM 8.6* 8.4*   ALBUMIN 2.1* 2.0*   PROT 6.1 5.7*   BILITOT 0.4 0.4   ALKPHOS 78 77   ALT 21 22   AST 34 25   MG 2.3 2.5   PHOS 2.3* 3.3         Significant Imaging:  I have reviewed all pertinent imaging results/findings within the past 24 hours.

## 2019-07-14 NOTE — PROGRESS NOTES
[facility]  Cardiology  Progress Note    Patient Name: Yogesh Lima  MRN: [id number]  Admission Date: 7/7/2019  Hospital Length of Stay: 7 days  Code Status: Prior   Attending Physician: Hilton Hanson MD   Primary Care Physician: [facility]  Expected Discharge Date: 7/17/2019  Principal Problem:AAA (abdominal aortic aneurysm) without rupture    Subjective:     Hospital Course:   No notes on file    Interval History: Sleeping comfortably no issues today    Review of Systems   Unable to perform ROS: patient nonverbal     Objective:     Vital Signs (Most Recent):  Temp: 97.6 °F (36.4 °C) (07/14/19 0700)  Pulse: 104 (07/14/19 1100)  Resp: (!) 30 (07/14/19 1100)  BP: 122/65 (07/14/19 1100)  SpO2: 100 % (07/14/19 1100) Vital Signs (24h Range):  Temp:  [97.6 °F (36.4 °C)-99.5 °F (37.5 °C)] 97.6 °F (36.4 °C)  Pulse:  [] 104  Resp:  [16-38] 30  SpO2:  [92 %-100 %] 100 %  BP: ()/(47-67) 122/65  Arterial Line BP: ()/(41-65) 123/59     Weight: 62.1 kg (137 lb)  Body mass index is 21.46 kg/m².     SpO2: 100 %  O2 Device (Oxygen Therapy): room air      Intake/Output Summary (Last 24 hours) at 7/14/2019 1146  Last data filed at 7/14/2019 1100  Gross per 24 hour   Intake 775.12 ml   Output 2045 ml   Net -1269.88 ml       Lines/Drains/Airways     Drain                 Urethral Catheter 07/12/19 1400 1 day          Peripheral Intravenous Line                 Peripheral IV - Single Lumen 20 G Left Forearm -- days         Peripheral IV - Single Lumen 07/13/19 2300 22 G Right Forearm less than 1 day                Physical Exam   Constitutional: He is oriented to person, place, and time. He appears well-developed and well-nourished.   Cachectic   HENT:   Head: Normocephalic and atraumatic.   Eyes: Pupils are equal, round, and reactive to light. Conjunctivae and EOM are normal.   Neck: No JVD present.   Cardiovascular: Normal rate, regular rhythm, normal heart sounds and  intact distal pulses. Exam reveals no gallop and no friction rub.   No murmur heard.  Pulmonary/Chest: Effort normal and breath sounds normal. No stridor. He has no rales.   Upper airway phlegm audible   Abdominal: Soft. Bowel sounds are normal. He exhibits no distension and no mass. There is no tenderness. There is no guarding.   Musculoskeletal: He exhibits no edema, tenderness or deformity.   Neurological: He is alert and oriented to person, place, and time.   Skin: Skin is warm and dry. No rash noted. No erythema.   Psychiatric:   Sleeping       Significant Labs:   BMP:   Recent Labs   Lab 07/13/19  0400 07/14/19  0400   * 94   * 134*   K 4.0 3.8    103   CO2 25 24   BUN 20 23   CREATININE 1.0 1.0   CALCIUM 8.6* 8.4*   MG 2.3 2.5       Significant Imaging: Echocardiogram:   Transthoracic echo (TTE) complete (Cupid Only):   Results for orders placed or performed during the hospital encounter of 07/07/19   Transthoracic echo (TTE) 2D with Color Flow   Result Value Ref Range    Ascending aorta 3.07 cm    STJ 3.14 cm    AV mean gradient 2 mmHg    Ao peak aaliyah 0.90 m/s    Ao VTI 12.22 cm    IVS 1.01 0.6 - 1.1 cm    LA size 4.50 cm    Left Atrium Major Axis 5.88 cm    Left Atrium Minor Axis 5.21 cm    LVIDD 5.76 3.5 - 6.0 cm    LVIDS 4.88 (A) 2.1 - 4.0 cm    LVOT diameter 2.22 cm    LVOT peak VTI 10.53 cm    PW 0.62 0.6 - 1.1 cm    RA Major Axis 5.72 cm    RA Width 3.28 cm    RVDD 2.62 cm    Sinus 3.18 cm    TAPSE 1.33 cm    TDI LATERAL 0.17 m/s    TDI SEPTAL 0.09 m/s    LA WIDTH 4.05 cm    LV Diastolic Volume 164.01 mL    LV Systolic Volume 111.91 mL    LVOT peak aaliyah 0.74 m/s    FS 15 %    LA volume 85.59 cm3    LV mass 177.38 g    Left Ventricle Relative Wall Thickness 0.22 cm    AV valve area 3.33 cm2    AV Velocity Ratio 0.82     AV index (prosthetic) 0.86     Mean e' 0.13 m/s    LVOT area 3.9 cm2    LVOT stroke volume 40.74 cm3    AV peak gradient 3 mmHg    LV Systolic Volume Index 64.8 mL/m2     LV Diastolic Volume Index 94.97 mL/m2    LA Volume Index 49.6 mL/m2    LV Mass Index 103 g/m2    BSA 1.72 m2    Narrative    · Severely decreased left ventricular systolic function with evidence of   basal inferior akinesis. The estimated ejection fraction is 25%.  · Mildly to moderately reduced right ventricular systolic function.  · Left ventricular diastolic dysfunction.  · Severe left atrial enlargement.  · Mild mitral regurgitation.  · Mechanically ventilated; cannot use inferior caval vein diameter to   estimate central venous pressure.        Assessment and Plan:       Hypotension  Unclear etiology, low suspicion for cardiogenic given no evidence of end organ dysfunction, cool extremities, dynamic changes in wall motion  - If patient again exhibits shock, would recommend obtaining central venous access and obtaining CVP, mVO2, David calculation to determine etiology of shock    NSTEMI (non-ST elevated myocardial infarction)  Unclear etiology, possible 2/2 to demand given hypotension, currently appears hypovolemic. EKG changes (anterolateral TWIs), trop elevation noted.    - s/p , ordered for ASA 81 qd  - s/p heparin gtt  - Continue plavix  - continue high intenity statin    Acute on chronic combined systolic and diastolic congestive heart failure  64 y/o M with history of CAD s/p CABG 1993, HFrEF (presumed 2/2 ICM last EF 42%), PVD, COPD, AAA presenting with abdominal pain s/p EVAR with course complicated by hypotension (possible shock) in setting of decompensated systolic heart failure and NSTEMI. Additionally with intermittent hypotension without clear markers of cardiogenic shock (I.e. BRITNEY, transaminitis, cool extremities, etc). 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 Currently BP correlates closely with HR, suggesting euvolemia    RECS  - Patient euvolemic, holding additional diuresis for now  - Strict I/Os  - continue Losartan 12.5   - can  continue Coreg at this time        Cardiology will sign off at this time. Please call back with any questions      VTE Risk Mitigation (From admission, onward)        Ordered     heparin (porcine) injection 5,000 Units  Every 8 hours      07/10/19 0959     IP VTE HIGH RISK PATIENT  Once      07/07/19 0568          Jay Jay Rodriguez MD  Cardiology  [facility]

## 2019-07-14 NOTE — SUBJECTIVE & OBJECTIVE
Medications:  Continuous Infusions:    Scheduled Meds:   albuterol-ipratropium  3 mL Nebulization Q4H    amoxicillin-clavulanate 875-125mg  1 tablet Oral Q12H    aspirin  81 mg Per OG tube Daily    atorvastatin  80 mg Per OG tube Daily    carvedilol  3.125 mg Oral BID    chlordiazepoxide  10 mg Oral Once    chlordiazepoxide  5 mg Oral TID    Followed by    [START ON 7/15/2019] chlordiazepoxide  5 mg Oral BID    Followed by    [START ON 7/16/2019] chlordiazepoxide  5 mg Oral Once    clopidogrel  75 mg Oral Daily    folic acid  1 mg Oral Daily    heparin (porcine)  5,000 Units Subcutaneous Q8H    losartan  12.5 mg Oral Daily    multivitamin  1 tablet Oral Daily    nicotine  1 patch Transdermal Daily    tamsulosin  0.8 mg Oral Daily    thiamine  100 mg Oral Daily    traZODone  100 mg Oral QHS     PRN Meds:acetaminophen, Dextrose 10% Bolus, Dextrose 10% Bolus, fentaNYL, haloperidol lactate, hydrALAZINE, HYDROcodone-acetaminophen, HYDROcodone-acetaminophen, magnesium sulfate IVPB, magnesium sulfate IVPB, potassium chloride in water **AND** potassium chloride in water **AND** potassium chloride in water, traZODone     Objective:     Vital Signs (Most Recent):  Temp: 97.6 °F (36.4 °C) (07/14/19 0700)  Pulse: 104 (07/14/19 1100)  Resp: (!) 30 (07/14/19 1100)  BP: 122/65 (07/14/19 1100)  SpO2: 100 % (07/14/19 1100) Vital Signs (24h Range):  Temp:  [97.6 °F (36.4 °C)-99.5 °F (37.5 °C)] 97.6 °F (36.4 °C)  Pulse:  [] 104  Resp:  [16-38] 30  SpO2:  [92 %-100 %] 100 %  BP: ()/(47-67) 122/65  Arterial Line BP: ()/(41-65) 123/59     Date 07/14/19 0700 - 07/15/19 0659   Shift 1260-5847 7462-0235 [id number] 24 Hour Total   INTAKE   P.O. 120   120   Shift Total(mL/kg) 120(1.9)   120(1.9)   OUTPUT   Urine(mL/kg/hr) 405   405   Shift Total(mL/kg) 405(6.5)   405(6.5)   Weight (kg) 62.1 62.1 62.1 62.1       Physical Exam   Constitutional: He is oriented to person, place, and time. He appears cachectic.    Disheveled    HENT:   Head: Normocephalic and atraumatic.   Nose: Nose normal.   Eyes: Pupils are equal, round, and reactive to light. Conjunctivae and EOM are normal. No scleral icterus.   Neck: Normal range of motion. No thyromegaly present.   Cardiovascular: Normal rate and regular rhythm. Exam reveals no gallop and no friction rub.   No murmur heard.  Pulses:       Radial pulses are 2+ on the right side, and 2+ on the left side.        Femoral pulses are 2+ on the right side, and 2+ on the left side.  PTs dopplerable in bilateral legs  DP with weak monophasic signal in right leg, non-dopplerable in left   Pulmonary/Chest:   On nasal cannula    Abdominal: Soft. He exhibits no distension. Mass:    There is no tenderness.   Genitourinary:   Genitourinary Comments: Bilateral groin sites c/d/i; no evidence of hematoma    Musculoskeletal: Normal range of motion.   Lymphadenopathy:     He has no cervical adenopathy.   Neurological: He is alert and oriented to person, place, and time.   Skin: Skin is warm and dry. No rash noted.       Significant Labs:  CBC:   Recent Labs   Lab 07/14/19  0400   WBC 5.82   RBC 3.68*   HGB 9.4*   HCT 29.4*   *   MCV 80*   MCH 25.5*   MCHC 32.0     CMP:   Recent Labs   Lab 07/14/19  0400   GLU 94   CALCIUM 8.4*   ALBUMIN 2.0*   PROT 5.7*   *   K 3.8   CO2 24      BUN 23   CREATININE 1.0   ALKPHOS 77   ALT 22   AST 25   BILITOT 0.4     Coagulation: No results for input(s): LABPROT, INR, APTT in the last 48 hours.    Significant Diagnostics:  I have reviewed all pertinent imaging results/findings within the past 24 hours.

## 2019-07-14 NOTE — PLAN OF CARE
CM went to room to see patient talk to patient about discharge plan and to give choices for nursing home placement.  Did try to call Jessica Emerson, [phone], no answer.  Did go through chart to find any numbers of friends or family with no luck.  CM did not speak to patient because, he had Ativan and was resting.  Did leave list of nursing home with nurses to put in chart.

## 2019-07-14 NOTE — PROGRESS NOTES
[facility]  Critical Care - Surgery  Progress Note    Patient Name: Yogesh Lima  MRN: [id number]  Admission Date: 7/7/2019  Hospital Length of Stay: 7 days  Code Status: Prior  Attending Provider: Hilton Hanson MD  Primary Care Provider: [facility]   Principal Problem: AAA (abdominal aortic aneurysm) without rupture    Subjective:     Hospital/ICU Course:  63-year-old male with history of HTN, HLD, current smoker, CAD s/p multi-vessel CABG (1993), heart failure, COPD, and known AAA (discovered April 2019)  who presented as transfer from OSH given symptomatic enlarging AAA. He presented to OSH this evening given abdominal pain of several days, worsening in nature, worst in LLQ and radiating to his back. He also was complaining of bilateral lower extremity edema, shortness of breath and dyspnea on exertion. BNP was 16,200. CT abdomen was performed given abdominal pain and history of AAA and his aneurysm was noted to be enlarged to 6.1cm from previous diameter of 4.8cm. Given this he was transferred to [facility] for emergent vascular surgery intervention. He's now s/p EVAR and remains intubated off pressors.     He became increasingly agitated the night of 7/9 and the morning of 7/10.. He was given ativan, haldol and eventually restarted on Precedex. He was also put in wrist restraints. He was weaned off of .      Interval History/Significant Events: NAEO.    Follow-up For: Procedure(s) (LRB):  REPAIR-ANEURYSM-ABDOMINAL AORTIC-ENDOVASCULAR (AAA) (Bilateral)    Post-Operative Day: 7 Days Post-Op    Objective:     Vital Signs (Most Recent):  Temp: 97.6 °F (36.4 °C) (07/14/19 0700)  Pulse: 82 (07/14/19 0730)  Resp: 20 (07/14/19 0730)  BP: (!) 91/52 (07/14/19 0700)  SpO2: 100 % (07/14/19 0730) Vital Signs (24h Range):  Temp:  [97.6 °F (36.4 °C)-99.5 °F (37.5 °C)] 97.6 °F (36.4 °C)  Pulse:  [] 82  Resp:  [16-38] 20  SpO2:  [89 %-100 %] 100 %  BP: ()/(47-72)  91/52  Arterial Line BP: ()/(39-72) 107/50     Weight: 62.1 kg (137 lb)  Body mass index is 21.46 kg/m².      Intake/Output Summary (Last 24 hours) at 7/14/2019 0745  Last data filed at 7/14/2019 0600  Gross per 24 hour   Intake 965.12 ml   Output 1915 ml   Net -949.88 ml       Physical Exam   Constitutional: He appears well-developed and well-nourished.   HENT:   Head: Normocephalic and atraumatic.   Cardiovascular: Normal rate and regular rhythm.   Pulmonary/Chest: Effort normal and breath sounds normal.   Abdominal: Soft. Bowel sounds are normal.   Genitourinary:   Genitourinary Comments: Mari in place   Neurological:   Drowsy   Skin: Skin is warm and dry.       Vents:  Vent Mode: A/C (07/09/19 0927)  Ventilator Initiated: Yes (07/07/19 2318)  Set Rate: 16 bmp (07/09/19 0927)  Vt Set: 380 mL (07/09/19 0927)  PEEP/CPAP: 5 cmH20 (07/09/19 0927)  Oxygen Concentration (%): 24 (07/12/19 0846)  Peak Airway Pressure: 20 cmH2O (07/09/19 0927)  Plateau Pressure: 12 cmH20 (07/09/19 0927)  Total Ve: 3.77 mL (07/09/19 0927)  F/VT Ratio<105 (RSBI): 1461.54 (07/09/19 0927)    Lines/Drains/Airways     Drain                 Urethral Catheter 07/12/19 1400 1 day          Peripheral Intravenous Line                 Peripheral IV - Single Lumen 20 G Left Forearm -- days         Peripheral IV - Single Lumen 07/13/19 2300 22 G Right Forearm less than 1 day                Significant Labs:    CBC/Anemia Profile:  Recent Labs   Lab 07/13/19  0400 07/14/19  0400   WBC 6.44 5.82   HGB 9.9* 9.4*   HCT 31.6* 29.4*   * 143*   MCV 83 80*   RDW 15.9* 15.8*        Chemistries:  Recent Labs   Lab 07/13/19  0400 07/14/19  0400   * 134*   K 4.0 3.8    103   CO2 25 24   BUN 20 23   CREATININE 1.0 1.0   CALCIUM 8.6* 8.4*   ALBUMIN 2.1* 2.0*   PROT 6.1 5.7*   BILITOT 0.4 0.4   ALKPHOS 78 77   ALT 21 22   AST 34 25   MG 2.3 2.5   PHOS 2.3* 3.3         Significant Imaging:  I have reviewed all pertinent imaging  results/findings within the past 24 hours.    Assessment/Plan:     Aneurysm of infrarenal abdominal aorta  63-year-old male with acute on chronic CHF exacerbation found to have a AAA enlarged to greater than 6cm from previous now s/p EVAR.     Neuro:  -Weaned off of propofol and fentanyl  -started on nicotine patch  -Librium taper  -PRN haldol overnight    Cards:  S/p EVAR  HDS  Mild tachycardia  CHF exacerbation resolved  -Echo 7/8: 25% EF  -keep systolics less than 140 and HR normal  -Off pressors  -clopidogrel/ASA    Resp:  -Extubated and on room air  -moraxella PNA on BAL    FENGI  -regular diet  -replace lytes as needed  -GI ppx    Heme:  CBC stable. Doing well.   -trend H/H  -DVT ppx    Renal:  Baseline CKD with admitting creatinine of 1.4.   -trend renal function, stable    Endo:  Blood sugars stable. No history of diabetes.   -monitor blood sugar.     ID:  -Resp cx positve for M. Catarrhalis   -Started on Augmentin  -trend white count    Dispo: Stepdown orders in         Lexis Choudhury MD  Critical Care - Surgery  [facility]

## 2019-07-14 NOTE — ASSESSMENT & PLAN NOTE
64 y/o M with history of CAD s/p CABG 1993, HFrEF (presumed 2/2 ICM last EF 42%), PVD, COPD, AAA presenting with abdominal pain s/p EVAR with course complicated by hypotension (possible shock) in setting of decompensated systolic heart failure and NSTEMI. Additionally with intermittent hypotension without clear markers of cardiogenic shock (I.e. BRITNEY, transaminitis, cool extremities, etc). 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 Currently BP correlates closely with HR, suggesting euvolemia    RECS  - Patient euvolemic, holding additional diuresis for now  - Strict I/Os  - continue Losartan 12.5   - can continue Coreg at this time

## 2019-07-14 NOTE — ASSESSMENT & PLAN NOTE
63-year-old male with acute on chronic CHF exacerbation found to have a AAA enlarged to greater than 6cm from previous now s/p EVAR.     Neuro:  -Weaned off of propofol and fentanyl  -started on nicotine patch  -Librium taper  -PRN haldol overnight    Cards:  S/p EVAR  HDS  Mild tachycardia  CHF exacerbation resolved  -Echo 7/8: 25% EF  -keep systolics less than 140 and HR normal  -Off pressors  -clopidogrel/ASA    Resp:  -Extubated and on room air  -moraxella PNA on BAL    FENGI  -regular diet  -replace lytes as needed  -GI ppx    Heme:  CBC stable. Doing well.   -trend H/H  -DVT ppx    Renal:  Baseline CKD with admitting creatinine of 1.4.   -trend renal function, stable    Endo:  Blood sugars stable. No history of diabetes.   -monitor blood sugar.     ID:  -Resp cx positve for M. Catarrhalis   -Started on Augmentin  -trend white count    Dispo: Stepdown orders in

## 2019-07-14 NOTE — PROGRESS NOTES
[facility]  Vascular Surgery  Progress Note    Patient Name: Yogesh Lima  MRN: [id number]  Admission Date: 7/7/2019  Primary Care Provider: [facility]    Subjective:     Interval History: no events. Alert, following commands    Post-Op Info:  Procedure(s) (LRB):  REPAIR-ANEURYSM-ABDOMINAL AORTIC-ENDOVASCULAR (AAA) (Bilateral)   7 Days Post-Op       Medications:  Continuous Infusions:    Scheduled Meds:   albuterol-ipratropium  3 mL Nebulization Q4H    amoxicillin-clavulanate 875-125mg  1 tablet Oral Q12H    aspirin  81 mg Per OG tube Daily    atorvastatin  80 mg Per OG tube Daily    carvedilol  3.125 mg Oral BID    chlordiazepoxide  10 mg Oral Once    chlordiazepoxide  5 mg Oral TID    Followed by    [START ON 7/15/2019] chlordiazepoxide  5 mg Oral BID    Followed by    [START ON 7/16/2019] chlordiazepoxide  5 mg Oral Once    clopidogrel  75 mg Oral Daily    folic acid  1 mg Oral Daily    heparin (porcine)  5,000 Units Subcutaneous Q8H    losartan  12.5 mg Oral Daily    multivitamin  1 tablet Oral Daily    nicotine  1 patch Transdermal Daily    tamsulosin  0.8 mg Oral Daily    thiamine  100 mg Oral Daily    traZODone  100 mg Oral QHS     PRN Meds:acetaminophen, Dextrose 10% Bolus, Dextrose 10% Bolus, fentaNYL, haloperidol lactate, hydrALAZINE, HYDROcodone-acetaminophen, HYDROcodone-acetaminophen, magnesium sulfate IVPB, magnesium sulfate IVPB, potassium chloride in water **AND** potassium chloride in water **AND** potassium chloride in water, traZODone     Objective:     Vital Signs (Most Recent):  Temp: 97.6 °F (36.4 °C) (07/14/19 0700)  Pulse: 104 (07/14/19 1100)  Resp: (!) 30 (07/14/19 1100)  BP: 122/65 (07/14/19 1100)  SpO2: 100 % (07/14/19 1100) Vital Signs (24h Range):  Temp:  [97.6 °F (36.4 °C)-99.5 °F (37.5 °C)] 97.6 °F (36.4 °C)  Pulse:  [] 104  Resp:  [16-38] 30  SpO2:  [92 %-100 %] 100 %  BP: ()/(47-67) 122/65  Arterial Line BP:  ()/(41-65) 123/59     Date 07/14/19 0700 - 07/15/19 0659   Shift 2486-4274 0001-3776 2137-5705 24 Hour Total   INTAKE   P.O. 120   120   Shift Total(mL/kg) 120(1.9)   120(1.9)   OUTPUT   Urine(mL/kg/hr) 405   405   Shift Total(mL/kg) 405(6.5)   405(6.5)   Weight (kg) 62.1 62.1 62.1 62.1       Physical Exam   Constitutional: He is oriented to person, place, and time. He appears cachectic.   Disheveled    HENT:   Head: Normocephalic and atraumatic.   Nose: Nose normal.   Eyes: Pupils are equal, round, and reactive to light. Conjunctivae and EOM are normal. No scleral icterus.   Neck: Normal range of motion. No thyromegaly present.   Cardiovascular: Normal rate and regular rhythm. Exam reveals no gallop and no friction rub.   No murmur heard.  Pulses:       Radial pulses are 2+ on the right side, and 2+ on the left side.        Femoral pulses are 2+ on the right side, and 2+ on the left side.  PTs dopplerable in bilateral legs  DP with weak monophasic signal in right leg, non-dopplerable in left   Pulmonary/Chest:   On nasal cannula    Abdominal: Soft. He exhibits no distension. Mass:    There is no tenderness.   Genitourinary:   Genitourinary Comments: Bilateral groin sites c/d/i; no evidence of hematoma    Musculoskeletal: Normal range of motion.   Lymphadenopathy:     He has no cervical adenopathy.   Neurological: He is alert and oriented to person, place, and time.   Skin: Skin is warm and dry. No rash noted.       Significant Labs:  CBC:   Recent Labs   Lab 07/14/19  0400   WBC 5.82   RBC 3.68*   HGB 9.4*   HCT 29.4*   *   MCV 80*   MCH 25.5*   MCHC 32.0     CMP:   Recent Labs   Lab 07/14/19  0400   GLU 94   CALCIUM 8.4*   ALBUMIN 2.0*   PROT 5.7*   *   K 3.8   CO2 24      BUN 23   CREATININE 1.0   ALKPHOS 77   ALT 22   AST 25   BILITOT 0.4     Coagulation: No results for input(s): LABPROT, INR, APTT in the last 48 hours.    Significant Diagnostics:  I have reviewed all pertinent imaging  results/findings within the past 24 hours.    Assessment/Plan:     Aneurysm of infrarenal abdominal aorta  64yo M with multiple medical co-morbidities who presented as transfer from OSH given symptomatic, enlarging AAA now s/p emergent EVAR on 7/7    Neuro: Agitation resolved. Librium taper started 7/10. Precedex weaned.  Resp: Good O2 sat on room air. duonebs q4 in setting of known COPD  CV: TTE with evidence of CHF, EF 25%, BNP significantly elevated when checked, Now off dobutamine. Continue daily ASA, statin; SBP < 140, HR < 100; Per cardiology recs, on coreg 3.125 and losartan 12.5 qd.   FEN/GI: reg diet, replete lytes   Renal: Will remove arreola and give void trial today. If unable to void, will replace arreola and consult urology. Start po lasix.  Heme/ID: H/H stable, no leukocytosis, resp cultures with moraxella - augmentin per ID for 5 days  Endo: POCT blood glucose, no current issues, continue to monitor  Msk: No issues, continue to monitor  Ppx: SQH    Dispo: stepdown with sitter, PT/OT/SW. Cousin reportedly has power of  and wants him to go to nursing home, but patient states he wants to go back to his trailer. PT/OT recommend SNF, waiting for social work to discuss with patient            Greg Morse MD  Vascular Surgery  [facility]    Vascular Attending  Agree with above  Doing well since emergent endovascular AAA repair for 6.4 cm AAA; more awake and alert  Still with urinary retention, which he has had for some time.  Appreciate  input    Hilton Hanson MD FACS  Vascular/Endovascular Surgery

## 2019-07-14 NOTE — ASSESSMENT & PLAN NOTE
62yo M with multiple medical co-morbidities who presented as transfer from OSH given symptomatic, enlarging AAA now s/p emergent EVAR on 7/7    Neuro: Agitation resolved. Librium taper started 7/10. Precedex weaned.  Resp: Good O2 sat on room air. duonebs q4 in setting of known COPD  CV: TTE with evidence of CHF, EF 25%, BNP significantly elevated when checked, Now off dobutamine. Continue daily ASA, statin; SBP < 140, HR < 100; Per cardiology recs, on coreg 3.125 and losartan 12.5 qd.   FEN/GI: reg diet, replete lytes   Renal: Will remove arreola and give void trial today. If unable to void, will replace arreola and consult urology. Start po lasix.  Heme/ID: H/H stable, no leukocytosis, resp cultures with moraxella - augmentin per ID for 5 days  Endo: POCT blood glucose, no current issues, continue to monitor  Msk: No issues, continue to monitor  Ppx: H    Dispo: stepdown with sitter, PT/OT/SW. Cousin reportedly has power of  and wants him to go to nursing home, but patient states he wants to go back to his trailer. PT/OT recommend SNF, waiting for social work to discuss with patient

## 2019-07-15 PROBLEM — R23.9 ALTERATION IN SKIN INTEGRITY DUE TO MOISTURE: Status: ACTIVE | Noted: 2019-01-01

## 2019-07-15 NOTE — ANESTHESIA POSTPROCEDURE EVALUATION
Anesthesia Post Evaluation    Patient: Yogesh Lima    Procedure(s) Performed: Procedure(s) (LRB):  REPAIR-ANEURYSM-ABDOMINAL AORTIC-ENDOVASCULAR (AAA) (Bilateral)    Final Anesthesia Type: general  Patient location during evaluation: PACU  Patient participation: No - Unable to Participate, Sedation  Level of consciousness: sedated  Pain management: adequate  Airway patency: patent  PONV status at discharge: No PONV  Anesthetic complications: no      Cardiovascular status: blood pressure returned to baseline and hemodynamically stable  Respiratory status: unassisted  Hydration status: euvolemic  Follow-up not needed.      Transported intubated to the ICU    Vitals Value Taken Time   /67 7/15/2019 11:02 AM   Temp 37 °C (98.6 °F) 7/15/2019  7:00 AM   Pulse 115 7/15/2019 11:54 AM   Resp 25 7/15/2019 11:54 AM   SpO2 100 % 7/15/2019 11:54 AM   Vitals shown include unvalidated device data.      No case tracking events are documented in the log.      Pain/Geoff Score: Pain Rating Prior to Med Admin: 8 (7/14/2019 12:39 AM)
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
## 2019-07-15 NOTE — PLAN OF CARE
Therapy recs are for Pt to go to SNF. Pt has Medicaid and would not be eligible for those services.  (MIGUEL) spoke with Pt's cousin and Angie PATEL ([phone]), who reported that she wants Pt to go to [facility] because PT's mother is a resident there. SW informed Angie of the Medicaid coverage issue and Angie requested that SW call Keira at [facility] to discuss options.    MIGUEL called Keira at [facility] (p:[phone]) and informed her of above. Keira confirmed that they would not be able to provide SNF services for Pt due to his insurance, but they would accept [facility] as a jail resident and then set-up Outpatient Therapy services.     MIGUEL spoke with Pt's physicians who confirmed that they are in agreement with the above plan.    MIGUEL sent referral with all necessary information, per Pt's family's choice, to [facility] via [facility].    SW will continue to follow and offer support as needed. MIGUEL in communication with Pt's , Shant ARAUJO     07/15/19 6895   Post-Acute Status   Post-Acute Authorization Placement   Post-Acute Placement Status Referrals Sent   Shital Negro, SW  [phone]

## 2019-07-15 NOTE — PROGRESS NOTES
Wound care consult received from nursing for skin breakdown to buttock.  Upon assessment, noted scattered areas over R/L buttocks that appears to be related to moisture/ fungal rather than pressure as indicated by nursing stating unstageable pressure injury.  Spoke with nurse today who agrees with assessment and notified Dr. Olvera. Will place orders for antifungal moisture barrier for nursing to apply BID to reduce skin breakdown and provide moisture barrier to protect skin.  Nursing already has Pressure injury prevention interventions in place.  Wound care team to sign off.  Please reconsult for any further needs. [id number]     07/15/19 1357        Wound 07/15/19 Moisture associated dermatitis Sacral Spine   Date First Assessed: 07/15/19   Pre-existing: Yes  Primary Wound Type: Moisture associated dermatitis  Location: Sacral Spine   Wound Image    Wound WDL ex   Dressing Appearance Open to air   Drainage Amount None   Appearance Pink   Tissue loss description Partial thickness   Red (%), Wound Tissue Color 100 %  (pink)   Wound Length (cm) 4 cm  (Scattered areas)   Wound Width (cm) 5 cm  (scattered areas)   Wound Depth (cm) 0.1 cm   Wound Volume (cm^3) 2 cm^3   Wound Surface Area (cm^2) 20 cm^2   Care   (ordered antifungal)

## 2019-07-15 NOTE — PLAN OF CARE
[facility]     Department of Hospital Medicine     [address] [place] [address]     [phone] after hours  [phone] fax       NURSING HOME ORDERS    07/15/2019    Admit to Nursing Home:  Regular Bed                                               Diagnoses:  Active Hospital Problems    Diagnosis  POA    *AAA (abdominal aortic aneurysm) without rupture [I71.4]  Yes    Hypotension [I95.9]  Unknown    Moraxella catarrhalis pneumonia [J15.6]  Unknown    CHF (congestive heart failure) [I50.9]  Yes    NSTEMI (non-ST elevated myocardial infarction) [I21.4]  Unknown    Bilateral carotid artery stenosis [I65.23]  Unknown    Encounter for observation for other suspected diseases and conditions ruled out [Z03.89]  Not Applicable    Acute on chronic respiratory failure with hypoxia and hypercapnia [J96.21, J96.22]  Yes    Aneurysm of infrarenal abdominal aorta [I71.4]  Yes    Benign hypertensive heart and kidney disease with HF and CKD [I13.0]  Yes    CAD (coronary artery disease) [I25.10]  Yes    Acute on chronic combined systolic and diastolic congestive heart failure [I50.43]  Yes    CKD (chronic kidney disease) stage 3, GFR 30-59 ml/min [N18.3]  Yes    Tobacco abuse [Z72.0]  Yes    History of MI (myocardial infarction) [I25.2]  Not Applicable     Inferior wall MI      Peripheral vascular disease [I73.9]  Yes    Chronic ischemic heart disease, unspecified [I25.9]  Yes    Essential hypertension [I10]  Yes      Resolved Hospital Problems   No resolved problems to display.       Patient is homebound due to:  AAA (abdominal aortic aneurysm) without rupture    Allergies:Review of patient's allergies indicates:  No Known Allergies    Vitals:       Once weekly    Diet: Cardiac     Supplement:  1 can every three times a day with meals                         Type: Ensure     Acitivities:    - Up in a chair each morning as tolerated   - Ambulate with  assistance to bathroom   - Scheduled walks once each shift (every 8 hours)    LABS:  Per facility protocol   CMP, CBC each month for 3 months   Pre-albumin each month for 3 months   TSH every year    Nursing Precautions:   - Aspiration precautions:             - Total assistance with meals            -  Upright 90 degrees befor during and after meals             -  Suction at bedside          - Fall precautions per nursing home protocol   - Seizure precaution per alf protocol   - Decubitus precautions:        -  for positioning   - Pressure reducing foam mattress   - Turn patient every two hours. Use wedge pillows to anchor patient    CONSULTS:     Physical Therapy to evaluate and treat     Occupational Therapy to evaluate and treat     Speech Therapy  to evaluate and treat     Nutrition to evaluate and recommend diet     Psychiatry to evaluate and follow patients for delirium    MISCELLANEOUS CARE:       Mari Care: Empty Mari bag every shift.  Change Mari every month                   DIABETES CARE:        Check blood sugar:     Fingerstick blood sugar a.m and p.m.    Fingerstick blood sugar AC and HS   Fingerstick blood sugar every 6 hours if unable to eat      Report CBG < 60 or > 400 to physician.                                          Insulin Sliding Scale          Glucose  Novolog Insulin Subcutaneous        0 - 60   Orange juice or glucose tablet, hold insulin      No insulin   201-250  2 units   251-300  4 units   301-350  6 units   351-400  8 units   >400   10 units then call physician      Medications: Discontinue all previous medication orders, if any. See new list below.     Yogesh Lima   Home Medication Instructions SUSSY:[id number]    Printed on:07/15/19 1413   Medication Information                      aspirin (ECOTRIN) 81 MG EC tablet  Take 1 tablet (81 mg total) by mouth once daily.             atorvastatin (LIPITOR) 80 MG tablet  Take 1 tablet (80 mg total) by mouth  once daily.             carvedilol (COREG) 3.125 MG tablet  Take 1 tablet (3.125 mg total) by mouth 2 (two) times daily.             clopidogrel (PLAVIX) 75 mg tablet  Take 1 tablet (75 mg total) by mouth once daily.             furosemide (LASIX) 40 MG tablet  Take 1 tablet (40 mg total) by mouth once daily.             lisinopril (PRINIVIL,ZESTRIL) 2.5 MG tablet  Take 1 tablet (2.5 mg total) by mouth once daily.             mirtazapine (REMERON) 15 MG tablet  Take 15 mg by mouth every evening.               polyethylene glycol (GLYCOLAX) 17 gram PwPk  Take 17 g by mouth 2 (two) times daily as needed (constipation).             tamsulosin (FLOMAX) 0.4 mg Cap  Take 1 capsule (0.4 mg total) by mouth once daily.             tiotropium (SPIRIVA) 18 mcg inhalation capsule  Inhale 1 capsule (18 mcg total) into the lungs once daily. Controller                       _________________________________  Carmen Ng MD  07/15/2019

## 2019-07-15 NOTE — PLAN OF CARE
[facility]    FACILITY TRANSFER ORDERS      Patient Name: Yogesh Lima  YOB: 1955    PCP: [facility]   PCP Address: [address]  PCP Phone Number: [phone]  PCP Fax: [phone]    Encounter Date: 07/15/2019    Admit to: [facility]    Vital Signs:  Routine    Diagnoses:   Active Hospital Problems    Diagnosis  POA    *AAA (abdominal aortic aneurysm) without rupture [I71.4]  Yes    Hypotension [I95.9]  Unknown    Moraxella catarrhalis pneumonia [J15.6]  Unknown    CHF (congestive heart failure) [I50.9]  Yes    NSTEMI (non-ST elevated myocardial infarction) [I21.4]  Unknown    Bilateral carotid artery stenosis [I65.23]  Unknown    Encounter for observation for other suspected diseases and conditions ruled out [Z03.89]  Not Applicable    Acute on chronic respiratory failure with hypoxia and hypercapnia [J96.21, J96.22]  Yes    Aneurysm of infrarenal abdominal aorta [I71.4]  Yes    Benign hypertensive heart and kidney disease with HF and CKD [I13.0]  Yes    CAD (coronary artery disease) [I25.10]  Yes    Acute on chronic combined systolic and diastolic congestive heart failure [I50.43]  Yes    CKD (chronic kidney disease) stage 3, GFR 30-59 ml/min [N18.3]  Yes    Tobacco abuse [Z72.0]  Yes    History of MI (myocardial infarction) [I25.2]  Not Applicable     Inferior wall MI      Peripheral vascular disease [I73.9]  Yes    Chronic ischemic heart disease, unspecified [I25.9]  Yes    Essential hypertension [I10]  Yes      Resolved Hospital Problems   No resolved problems to display.       Allergies:Review of patient's allergies indicates:  No Known Allergies    Diet: cardiac diet    Activities: Activity as tolerated    Nursing: per institution      Labs: no regular labs needed    CONSULTS:    Physical Therapy to evaluate and treat. , Occupational Therapy to evaluate and treat. and  to  evaluate for community resources/long-range planning.    MISCELLANEOUS CARE:  Mari Care: Empty Mari bag every shift. Change Mari every month.    WOUND CARE ORDERS  None    Medications: Review discharge medications with patient and family and provide education.      Current Discharge Medication List      CONTINUE these medications which have NOT CHANGED    Details   aspirin (ECOTRIN) 81 MG EC tablet Take 1 tablet (81 mg total) by mouth once daily.  Qty: 30 tablet, Refills: 1      atorvastatin (LIPITOR) 80 MG tablet Take 1 tablet (80 mg total) by mouth once daily.  Qty: 30 tablet, Refills: 1      carvedilol (COREG) 3.125 MG tablet Take 1 tablet (3.125 mg total) by mouth 2 (two) times daily.  Qty: 60 tablet, Refills: 1      clopidogrel (PLAVIX) 75 mg tablet Take 1 tablet (75 mg total) by mouth once daily.  Qty: 30 tablet, Refills: 1      furosemide (LASIX) 40 MG tablet Take 1 tablet (40 mg total) by mouth once daily.  Qty: 30 tablet, Refills: 1      lisinopril (PRINIVIL,ZESTRIL) 2.5 MG tablet Take 1 tablet (2.5 mg total) by mouth once daily.  Qty: 30 tablet, Refills: 1      mirtazapine (REMERON) 15 MG tablet Take 15 mg by mouth every evening.        polyethylene glycol (GLYCOLAX) 17 gram PwPk Take 17 g by mouth 2 (two) times daily as needed (constipation).  Qty: 30 each, Refills: 0      tamsulosin (FLOMAX) 0.4 mg Cap Take 1 capsule (0.4 mg total) by mouth once daily.  Qty: 30 capsule, Refills: 1      tiotropium (SPIRIVA) 18 mcg inhalation capsule Inhale 1 capsule (18 mcg total) into the lungs once daily. Controller  Qty: 30 capsule, Refills: 11                  _________________________________  Carmen Ng MD  07/15/2019

## 2019-07-15 NOTE — ANESTHESIA POSTPROCEDURE EVALUATION
Anesthesia Post Evaluation    Patient: Yogesh Lima    Procedure(s) Performed: Procedure(s) (LRB):  REPAIR-ANEURYSM-ABDOMINAL AORTIC-ENDOVASCULAR (AAA) (Bilateral)    Final Anesthesia Type: general  Patient location during evaluation: ICU  Patient participation: Yes- Able to Participate  Level of consciousness: awake and alert and oriented  Pain management: adequate  Airway patency: patent  PONV status at discharge: No PONV  Anesthetic complications: no      Cardiovascular status: blood pressure returned to baseline and hemodynamically stable  Respiratory status: unassisted  Hydration status: euvolemic  Follow-up not needed.          Vitals Value Taken Time   /67 7/15/2019 11:02 AM   Temp 37 °C (98.6 °F) 7/15/2019  7:00 AM   Pulse 114 7/15/2019 11:53 AM   Resp 27 7/15/2019 11:53 AM   SpO2 100 % 7/15/2019 11:53 AM   Vitals shown include unvalidated device data.      No case tracking events are documented in the log.      Pain/Geoff Score: Pain Rating Prior to Med Admin: 8 (7/14/2019 12:39 AM)

## 2019-07-15 NOTE — PROGRESS NOTES
[facility]  Vascular Surgery  Progress Note    Patient Name: Yogesh Lima  MRN: [id number]  Admission Date: 7/7/2019  Primary Care Provider: [facility]    Subjective:     Interval History: HR tachycardic overnight and hypertensive pressures, metoprolol was ineffective at controlling. Pt with shallow breaths this AM. Also appears somnolent.     Post-Op Info:  Procedure(s) (LRB):  REPAIR-ANEURYSM-ABDOMINAL AORTIC-ENDOVASCULAR (AAA) (Bilateral)   8 Days Post-Op       Medications:  Continuous Infusions:    Scheduled Meds:   albuterol-ipratropium  3 mL Nebulization Q4H    amoxicillin-clavulanate 875-125mg  1 tablet Oral Q12H    aspirin  81 mg Per OG tube Daily    atorvastatin  80 mg Per OG tube Daily    carvedilol  6.25 mg Oral BID    chlordiazepoxide  5 mg Oral BID    Followed by    [START ON 7/16/2019] chlordiazepoxide  5 mg Oral Once    clopidogrel  75 mg Oral Daily    doxazosin  4 mg Oral Daily    folic acid  1 mg Oral Daily    furosemide  20 mg Oral Daily    heparin (porcine)  5,000 Units Subcutaneous Q8H    losartan  12.5 mg Oral Daily    multivitamin  1 tablet Oral Daily    nicotine  1 patch Transdermal Daily    thiamine  100 mg Oral Daily    traZODone  100 mg Oral QHS     PRN Meds:acetaminophen, Dextrose 10% Bolus, Dextrose 10% Bolus, fentaNYL, hydrALAZINE, HYDROcodone-acetaminophen, HYDROcodone-acetaminophen, magnesium sulfate IVPB, magnesium sulfate IVPB, potassium chloride in water **AND** potassium chloride in water **AND** potassium chloride in water, traZODone     Objective:     Vital Signs (Most Recent):  Temp: 98.1 °F (36.7 °C) (07/15/19 0400)  Pulse: (!) 122 (07/15/19 0818)  Resp: (!) 27 (07/15/19 0818)  BP: (!) 126/59 (07/15/19 0420)  SpO2: 100 % (07/15/19 0818) Vital Signs (24h Range):  Temp:  [97.9 °F (36.6 °C)-99.6 °F (37.6 °C)] 98.1 °F (36.7 °C)  Pulse:  [] 122  Resp:  [21-39] 27  SpO2:  [97 %-100 %] 100 %  BP: (101-162)/(53-85)  126/59  Arterial Line BP: (106-175)/(41-76) 145/56         Physical Exam   Constitutional: He is oriented to person, place, and time. He appears cachectic.   HENT:   Head: Normocephalic and atraumatic.   Nose: Nose normal.   Eyes: Pupils are equal, round, and reactive to light. Conjunctivae and EOM are normal. No scleral icterus.   Neck: Normal range of motion. No thyromegaly present.   Cardiovascular: Normal rate and regular rhythm. Exam reveals no gallop and no friction rub.   No murmur heard.  Pulses:       Radial pulses are 2+ on the right side, and 2+ on the left side.        Femoral pulses are 2+ on the right side, and 2+ on the left side.  PTs dopplerable in bilateral legs  DP with weak monophasic signal in right leg, non-dopplerable in left   Pulmonary/Chest:   On nasal cannula    Abdominal: Soft. He exhibits no distension. Mass:    There is no tenderness.   Genitourinary:   Genitourinary Comments: Bilateral groin sites c/d/i; no evidence of hematoma    Musculoskeletal: Normal range of motion.   Lymphadenopathy:     He has no cervical adenopathy.   Neurological: He is alert and oriented to person, place, and time.   Skin: Skin is warm and dry. No rash noted.       Significant Labs:  CBC:   Recent Labs   Lab 07/15/19  0324   WBC 8.95   RBC 4.26*   HGB 10.8*   HCT 33.5*      MCV 79*   MCH 25.4*   MCHC 32.2     CMP:   Recent Labs   Lab 07/15/19  0324   GLU 95   CALCIUM 9.2   ALBUMIN 2.3*   PROT 6.7   *   K 4.3   CO2 23      BUN 21   CREATININE 1.0   ALKPHOS 90   ALT 30   AST 31   BILITOT 0.5     Significant Diagnostics:  I have reviewed all pertinent imaging results/findings within the past 24 hours.    Assessment/Plan:     Aneurysm of infrarenal abdominal aorta  64yo M with multiple medical co-morbidities who presented as transfer from OSH given symptomatic, enlarging AAA now s/p emergent EVAR on 7/7    Neuro: Agitation resolved. Librium taper started 7/10. Precedex weaned.   Resp: Good O2  sat on room air. duonebs q4 in setting of known COPD, encourage IS and OOTB  CV: TTE with evidence of CHF, EF 25%, BNP significantly elevated when checked, Now off dobutamine. Continue daily ASA, statin; SBP > 140, HR > 100; Increased coreg to 6.5, continue losartan 12.5 qd. Discontinue Metop for HR.   FEN/GI: reg diet, replete lytes   Renal: Failed voiding trial yesterday and arreola was replaced, continue arreola upon discharge and f/u to Urology clinic after discharge  Heme/ID: H/H stable, no leukocytosis, resp cultures with moraxella - augmentin per ID for 5 days  Endo: POCT blood glucose, no current issues, continue to monitor  Msk: No issues, continue to monitor  Ppx: SQH    Dispo: stepdown with sitter, PT/OT/SW. Cousin reportedly has power of  and wants him to go to nursing home, but patient states he wants to go back to his trailer. PT/OT recommend SNF, waiting for social work to discuss with patient              Jonathan Russo MD  Vascular Surgery  [facility]

## 2019-07-15 NOTE — PROGRESS NOTES
Patient sitting up in chair, mentation at baseline.  OT at bedside asking to work with patient. Before moving, cuff cycled many times and lower than baseline BP.  Sat patient up more, and BP continued to drop.  Charge nurse and MD called.  Patient put back in bed safely.  Positive passive leg raise.  Vascular called and approved 500cc bolus.  Scheduled meds changed to decrease BP effects.

## 2019-07-15 NOTE — PLAN OF CARE
Problem: SLP Goal  Goal: SLP Goal  Speech Language Pathology Goals  Goals expected to be met by 7/19:  1. Patient will tolerate a regular consistency diet and thin liquids with no overt signs of airway compromise.          Outcome: Outcome(s) achieved Date Met: 07/15/19  Patient tolerating a regular diet and thin liquids with no overt s/s of aspiration. No further skilled acute ST services warranted at this time. Please re-consult as needed.   BARBI Miguel., CCC-SLP  Pager: [phone]  07/15/2019

## 2019-07-15 NOTE — ANESTHESIA POSTPROCEDURE EVALUATION
Anesthesia Post Evaluation    Patient: Yogesh Lima    Procedure(s) Performed: Procedure(s) (LRB):  REPAIR-ANEURYSM-ABDOMINAL AORTIC-ENDOVASCULAR (AAA) (Bilateral)    [facility] Anesthesia Post Op Evaluation      Vitals Value Taken Time   /67 7/15/2019 11:02 AM   Temp 37 °C (98.6 °F) 7/15/2019  7:00 AM   Pulse 114 7/15/2019 11:53 AM   Resp 27 7/15/2019 11:53 AM   SpO2 100 % 7/15/2019 11:53 AM   Vitals shown include unvalidated device data.      No case tracking events are documented in the log.      Pain/Geoff Score: Pain Rating Prior to Med Admin: 8 (7/14/2019 12:39 AM)

## 2019-07-15 NOTE — PROCEDURES
"Yogesh Lima is a 64 y.o. male patient.    Temp: 98.2 °F (36.8 °C) (07/15/19 1500)  Pulse: 107 (07/15/19 1715)  Resp: (!) 25 (07/15/19 1715)  BP: (!) 109/58 (07/15/19 1700)  SpO2: 98 % (07/15/19 1715)  Weight: 62.1 kg (137 lb) (07/12/19 1100)  Height: 5' 7" (170.2 cm) (07/12/19 1100)       Central Line  Date/Time: 7/15/2019 5:40 PM  Location procedure was performed: [facility] CRITICAL CARE SURGERY  Performed by: Tariq Olvera MD  Supervising provider: Simona Lawson  Assisting provider: Simona Lawson MD  Consent Done: Emergent Situation  Time out: Immediately prior to procedure a "time out" was called to verify the correct patient, procedure, equipment, support staff and site/side marked as required.  Indications: med administration  Anesthesia: local infiltration    Anesthesia:  Local Anesthetic: lidocaine 1% with epinephrine  Anesthetic total: 2 mL  Preparation: skin prepped with ChloraPrep  Skin prep agent dried: skin prep agent completely dried prior to procedure  Sterile barriers: all five maximum sterile barriers used - cap, mask, sterile gown, sterile gloves, and large sterile sheet  Hand hygiene: hand hygiene performed prior to central venous catheter insertion  Location details: right internal jugular  Catheter type: triple lumen  Catheter size: 7 Fr  Catheter Length: 16cm    Ultrasound guidance: yes  , compressibility normal  Needle advanced into vessel with real time Ultrasound guidance.  Guidewire confirmed in vessel.  Sterile sheath used.  Number of attempts: 1  Assessment: placement verified by x-ray,  no pneumothorax on x-ray and successful placement  Complications: none  Estimated blood loss (mL): 2  Specimens: No  Implants: No  Post-procedure: line sutured,  sterile dressing applied,  chlorhexidine patch and blood return through all ports  Complications: No          Tariq Olvera  7/15/2019  "

## 2019-07-15 NOTE — PLAN OF CARE
Problem: Physical Therapy Goal  Goal: Physical Therapy Goal  Goals to be met by: 2019     Patient will increase functional independence with mobility by performin. Supine to sit with Set-up [place]  2. Sit to stand transfer with Contact Guard Assistance  3. Bed to chair transfer with Contact Guard Assistance using LRAD  4. Gait  x 25 feet with Contact Guard Assistance using LRAD.   5. Ascend/descend 3 stair with left Handrails Minimal Assistance using LRAD.      Outcome: Ongoing (interventions implemented as appropriate)    Discharge Recommendation: SS-SNF.  Please continue Progressive Mobility Protocol as appropriate.  Appropriate transfer level with nursing staff: Bed <> Chair:  Stand Pivot with minimum assistance with No Assistive Device.    Tiarra Concepcion PT, DPT  7/15/2019  Pager: [phone]

## 2019-07-15 NOTE — SUBJECTIVE & OBJECTIVE
Medications:  Continuous Infusions:    Scheduled Meds:   albuterol-ipratropium  3 mL Nebulization Q4H    amoxicillin-clavulanate 875-125mg  1 tablet Oral Q12H    aspirin  81 mg Per OG tube Daily    atorvastatin  80 mg Per OG tube Daily    carvedilol  6.25 mg Oral BID    chlordiazepoxide  5 mg Oral BID    Followed by    [START ON 7/16/2019] chlordiazepoxide  5 mg Oral Once    clopidogrel  75 mg Oral Daily    doxazosin  4 mg Oral Daily    folic acid  1 mg Oral Daily    furosemide  20 mg Oral Daily    heparin (porcine)  5,000 Units Subcutaneous Q8H    losartan  12.5 mg Oral Daily    multivitamin  1 tablet Oral Daily    nicotine  1 patch Transdermal Daily    thiamine  100 mg Oral Daily    traZODone  100 mg Oral QHS     PRN Meds:acetaminophen, Dextrose 10% Bolus, Dextrose 10% Bolus, fentaNYL, hydrALAZINE, HYDROcodone-acetaminophen, HYDROcodone-acetaminophen, magnesium sulfate IVPB, magnesium sulfate IVPB, potassium chloride in water **AND** potassium chloride in water **AND** potassium chloride in water, traZODone     Objective:     Vital Signs (Most Recent):  Temp: 98.1 °F (36.7 °C) (07/15/19 0400)  Pulse: (!) 122 (07/15/19 0818)  Resp: (!) 27 (07/15/19 0818)  BP: (!) 126/59 (07/15/19 0420)  SpO2: 100 % (07/15/19 0818) Vital Signs (24h Range):  Temp:  [97.9 °F (36.6 °C)-99.6 °F (37.6 °C)] 98.1 °F (36.7 °C)  Pulse:  [] 122  Resp:  [21-39] 27  SpO2:  [97 %-100 %] 100 %  BP: (101-162)/(53-85) 126/59  Arterial Line BP: (106-175)/(41-76) 145/56         Physical Exam   Constitutional: He is oriented to person, place, and time. He appears cachectic.   HENT:   Head: Normocephalic and atraumatic.   Nose: Nose normal.   Eyes: Pupils are equal, round, and reactive to light. Conjunctivae and EOM are normal. No scleral icterus.   Neck: Normal range of motion. No thyromegaly present.   Cardiovascular: Normal rate and regular rhythm. Exam reveals no gallop and no friction rub.   No murmur heard.  Pulses:        Radial pulses are 2+ on the right side, and 2+ on the left side.        Femoral pulses are 2+ on the right side, and 2+ on the left side.  PTs dopplerable in bilateral legs  DP with weak monophasic signal in right leg, non-dopplerable in left   Pulmonary/Chest:   On nasal cannula    Abdominal: Soft. He exhibits no distension. Mass:    There is no tenderness.   Genitourinary:   Genitourinary Comments: Bilateral groin sites c/d/i; no evidence of hematoma    Musculoskeletal: Normal range of motion.   Lymphadenopathy:     He has no cervical adenopathy.   Neurological: He is alert and oriented to person, place, and time.   Skin: Skin is warm and dry. No rash noted.       Significant Labs:  CBC:   Recent Labs   Lab 07/15/19  0324   WBC 8.95   RBC 4.26*   HGB 10.8*   HCT 33.5*      MCV 79*   MCH 25.4*   MCHC 32.2     CMP:   Recent Labs   Lab 07/15/19  0324   GLU 95   CALCIUM 9.2   ALBUMIN 2.3*   PROT 6.7   *   K 4.3   CO2 23      BUN 21   CREATININE 1.0   ALKPHOS 90   ALT 30   AST 31   BILITOT 0.5     Significant Diagnostics:  I have reviewed all pertinent imaging results/findings within the past 24 hours.

## 2019-07-15 NOTE — PT/OT/SLP PROGRESS
Occupational Therapy   Treatment    Name: Yogesh Lima  MRN: [id number]  Admitting Diagnosis:  AAA (abdominal aortic aneurysm) without rupture  8 Days Post-Op    Recommendations:     Discharge Recommendations: nursing facility, skilled  Discharge Equipment Recommendations:  (TBD)  Barriers to discharge:  None    Assessment:     Yogesh Lima is a 64 y.o. male with a medical diagnosis of AAA (abdominal aortic aneurysm) without rupture.  He was able to perform supine/sit, sit/stand, and bed/chair T/F c mod A.  Able to perform UB dressing and LB dressing c total assist.  Pt c head in flexed posture throughout most of tx session and required max verbal and tactile cues to maintain head control.  Has poor dynamic standing balance at this time. Performance deficits affecting function are weakness, impaired endurance, impaired self care skills, impaired functional mobilty, impaired balance, impaired cognition, decreased upper extremity function, decreased ROM.     Rehab Prognosis:  Good; patient would benefit from acute skilled OT services to address these deficits and reach maximum level of function.       Plan:     Patient to be seen 4 x/week to address the above listed problems via self-care/home management, therapeutic activities, therapeutic exercises  · Plan of Care Expires:    · Plan of Care Reviewed with: patient    Subjective     Pain/Comfort:  · Pain Rating 1: 0/10    Objective:     Communicated with: RN prior to session.  Patient found supine with blood pressure cuff, peripheral IV, pulse ox (continuous), telemetry upon OT entry to room.    General Precautions: Standard, fall   Orthopedic Precautions:N/A   Braces: N/A     Occupational Performance:     Bed Mobility:    · Patient completed Supine to Sit with moderate assistance     Functional Mobility/Transfers:  · Patient completed Sit <> Stand Transfer with moderate assistance  with  hand-held assist   · Patient completed Bed <> Chair Transfer using Stand Pivot  technique with moderate assistance with hand-held assist      Activities of Daily Living:  · Upper Body Dressing: maximal assistance to don hospital gown 2* IV lines.  · Lower Body Dressing: total assistance to don socks.      [facility] 6 Click ADL: 13        Patient left up in chair with all lines intact, call button in reach and RN notifiedEducation:      GOALS:   Multidisciplinary Problems     Occupational Therapy Goals        Problem: Occupational Therapy Goal    Goal Priority Disciplines Outcome Interventions   Occupational Therapy Goal     OT, PT/OT     Description:  Goals to be met by: 7/21/19    Patient will increase functional independence with ADLs by performing:    UE Dressing with Supervision.  LE Dressing with Supervision.  Grooming while standing with Supervision.  Toileting from toilet with Supervision for hygiene and clothing management.   Pt to complete transfer to bed, chair and commode with supervision.                       Time Tracking:     OT Date of Treatment: 07/15/19  OT Start Time: 1000  OT Stop Time: 1028  OT Total Time (min): 28 min    Billable Minutes:Self Care/Home Management 14  Therapeutic Activity 14    RAMSEY Del Toro  7/15/2019

## 2019-07-15 NOTE — PLAN OF CARE
Problem: Occupational Therapy Goal  Goal: Occupational Therapy Goal  Goals to be met by: 7/21/19    Patient will increase functional independence with ADLs by performing:    UE Dressing with Supervision.  LE Dressing with Supervision.  Grooming while standing with Supervision.  Toileting from toilet with Supervision for hygiene and clothing management.   Pt to complete transfer to bed, chair and commode with supervision.      Cont. POC.

## 2019-07-15 NOTE — ASSESSMENT & PLAN NOTE
63-year-old male with acute on chronic CHF exacerbation found to have a AAA enlarged to greater than 6cm from previous now s/p EVAR.     Neuro:  -Weaned off of propofol and fentanyl  -started on nicotine patch  -Librium taper  -PRN haldol and ativan required for agitation.     Cards:  S/p EVAR  Mild tachycardia and hypertension. Received PRN metoprolol and hydralazine  CHF exacerbation resolved  -Echo 7/8: 25% EF  -keep systolics less than 140 and HR <100  -Off pressors  -clopidogrel/ASA  -started of carvedilol and losartan    Resp:  -Extubated and on room air  -moraxella PNA on BAL    FENGI  -regular diet  -replace lytes as needed  -GI ppx    Heme:  CBC stable. Doing well.   -trend H/H  -DVT ppx    Renal:  Baseline CKD with admitting creatinine of 1.4.   -Replaced arreola for urinary retention.   -Started on home flomax  -trend renal function, stable    Endo:  Blood sugars stable. No history of diabetes.   -monitor blood sugar.     ID:  -Resp cx positve for M. Catarrhalis   -Started on Augmentin  -trend white count    Dispo: Stepdown orders in

## 2019-07-15 NOTE — ASSESSMENT & PLAN NOTE
64yo M with multiple medical co-morbidities who presented as transfer from OSH given symptomatic, enlarging AAA now s/p emergent EVAR on 7/7    Neuro: Agitation resolved. Librium taper started 7/10. Precedex weaned.   Resp: Good O2 sat on room air. duonebs q4 in setting of known COPD, encourage IS and OOTB  CV: TTE with evidence of CHF, EF 25%, BNP significantly elevated when checked, Now off dobutamine. Continue daily ASA, statin; SBP > 140, HR > 100; Increased coreg 6.5 and losartan 12.5 qd. Discontinue Metop for HR.   FEN/GI: reg diet, replete lytes   Renal: Failed voiding trial yesterday and arreola was replaced, Urology consult for recommendations  Heme/ID: H/H stable, no leukocytosis, resp cultures with moraxella - augmentin per ID for 5 days  Endo: POCT blood glucose, no current issues, continue to monitor  Msk: No issues, continue to monitor  Ppx: SQH    Dispo: stepdown with sitter, PT/OT/SW. Cousin reportedly has power of  and wants him to go to nursing home, but patient states he wants to go back to his trailer. PT/OT recommend SNF, waiting for social work to discuss with patient

## 2019-07-15 NOTE — PT/OT/SLP PROGRESS
Physical Therapy Treatment    Patient Name:  Yogesh Lima   MRN:  [id number]  Admitting Diagnosis: AAA (abdominal aortic aneurysm) without rupture  Recent Surgery: Procedure(s) (LRB):  REPAIR-ANEURYSM-ABDOMINAL AORTIC-ENDOVASCULAR (AAA) (Bilateral) 8 Days Post-Op    Recommendations:     Discharge Recommendations:  nursing facility, skilled   Discharge Equipment Recommendations: walker, rolling   Barriers to discharge: Decreased caregiver support    Plan:     During this hospitalization, patient to be seen 4 x/week to address the listed problems via gait training, therapeutic activities, therapeutic exercises, neuromuscular re-education  · Plan of Care Expires:  08/13/19   Plan of Care Reviewed with: patient    Assessment:     Yogesh Lima is a 64 y.o. male admitted with a medical diagnosis of AAA (abdominal aortic aneurysm) without rupture.   Patient is making progress towards goals, participating well in this session. Pt found seated in bedside chair, lethargic and slow to answer questions. BP reading 81/43mm Hg with subsequent 4 BP readings lower than the previous. Pt awake and alert, however pt returned to supine due to BP lability. RN present and aware with charge RN and MD. Yogesh Lima's deficits effect their ability to safely and independently participate in self-care tasks and functional mobility which increases their caregiver burden. Due to his physical therapy diagnosis of debility and deconditioning, they continue to benefit from acute PT services to address the following limitations: high fall risk, weakness, instability, the need for supervised instruction of exercise. Yogesh Lima's deficits effect their roles and responsibilities in which they were able to complete prior to admit. Education was provided to patient regarding importance of continued participation in therapy, patient's progress and discharge disposition. Pt would benefit from a collaborative PT/OT/SLP program to improve quality of life  "and focus on recovery of impairments.     Problem List: weakness, gait instability, decreased upper extremity function, impaired endurance, impaired balance, decreased lower extremity function, impaired self care skills, impaired functional mobilty.  Rehab Prognosis: Fair     GOALS:   Multidisciplinary Problems     Physical Therapy Goals        Problem: Physical Therapy Goal    Goal Priority Disciplines Outcome Goal Variances Interventions   Physical Therapy Goal     PT, PT/OT Ongoing (interventions implemented as appropriate)     Description:  Goals to be met by: 2019     Patient will increase functional independence with mobility by performin. Supine to sit with Set-up [place]  2. Sit to stand transfer with Contact Guard Assistance  3. Bed to chair transfer with Contact Guard Assistance using LRAD  4. Gait  x 25 feet with Contact Guard Assistance using LRAD.   5. Ascend/descend 3 stair with left Handrails Minimal Assistance using LRAD.                       Subjective     Communicated with RN prior to session.  Patient found seated in bedside chair upon PT entry to room.  "I already told you my birthday."    Pain/Comfort:  · Pain Rating 1: 0/10  · Pain Rating Post-Intervention 1: 0/10    Objective:     Patient found with: blood pressure cuff, pulse ox (continuous), telemetry, peripheral IV   Mental Status: Patient is oriented to AxOx4 and follows all verbal, multi-step commands. Patient is Alert and Cooperative during session.    General Precautions: Standard, Cardiac fall   Orthopedic Precautions:N/A   Braces: N/A   Body mass index is 21.46 kg/m².  Oxygen Device: Room Air    Vitals:    07/15/19 1400   BP: (!) 81/46   Pulse: 109   Resp: (!) 25   Temp:        Outcome Measures:  AM-PAC 6 CLICK MOBILITY  Turning over in bed (including adjusting bedclothes, sheets and blankets)?: 1  Sitting down on and standing up from a chair with arms (e.g., wheelchair, bedside commode, etc.): 3  Moving from lying " on back to sitting on the side of the bed?: 3  Moving to and from a bed to a chair (including a wheelchair)?: 3  Need to walk in hospital room?: 3  Climbing 3-5 steps with a railing?: 1  Basic Mobility Total Score: 14     Functional Mobility:  Additional staff present: RN, SPT, MD  Bed Mobility:   · Sit to Supine: maximal assistance; to left side of bed    Transfers:    Chair <> Bed Transfer: Stand Pivot technique with total assistance with hand-held assist  o Bed on patient's left  o Due to BP lability, pt quickly returned to supine      Gait:  NT due to low BP    Therapeutic Activities & Exercises:   PT supported BLE above level of heart to aide in return, SCDs applied. Pt stabilized per RN, RN and charge RN took over care of pt.    Education:  Patient provided with daily orientation and goals of this PT session. Patient agreed to participate in session.  All questions answered to patient's and family's satisfaction, within scope of PT practice; voiced no other concerns. White board updated in patient's room, RN notified of session.    Patient left right sidelying, with head in midline, neutral pelvis & heels floated for skin protection with all lines intact, call button in reach and RN notified      Time Tracking:     PT Received On: 07/15/19  PT Start Time: 1435     PT Stop Time: 1458  PT Total Time (min): 23 min     Billable Minutes:   · Therapeutic Activity 23    Treatment Type: Treatment  PT/PTA: PT       Tiarra Concepcion PT, DPT  07/15/2019  Pager: [phone]

## 2019-07-15 NOTE — PROGRESS NOTES
[facility]  Critical Care - Surgery  Progress Note    Patient Name: Yogesh Lima  MRN: [id number]  Admission Date: 7/7/2019  Hospital Length of Stay: 8 days  Code Status: Prior  Attending Provider: Hilton Hanson MD  Primary Care Provider: [facility]   Principal Problem: AAA (abdominal aortic aneurysm) without rupture    Subjective:     Hospital/ICU Course:  63-year-old male with history of HTN, HLD, current smoker, CAD s/p multi-vessel CABG (1993), heart failure, COPD, and known AAA (discovered April 2019)  who presented as transfer from OSH given symptomatic enlarging AAA. He presented to OSH this evening given abdominal pain of several days, worsening in nature, worst in LLQ and radiating to his back. He also was complaining of bilateral lower extremity edema, shortness of breath and dyspnea on exertion. BNP was 16,200. CT abdomen was performed given abdominal pain and history of AAA and his aneurysm was noted to be enlarged to 6.1cm from previous diameter of 4.8cm. Given this he was transferred to [facility] for emergent vascular surgery intervention. He's now s/p EVAR and remains intubated off pressors.     Interval History/Significant Events: Pt received home trazodone overnight. Minimal agitation. Remained tachycardic w/ intermittent increased BP. Received prn metoprolol and hydralazine. UO adequate.     Follow-up For: Procedure(s) (LRB):  REPAIR-ANEURYSM-ABDOMINAL AORTIC-ENDOVASCULAR (AAA) (Bilateral)    Post-Operative Day: 8 Days Post-Op    Objective:     Vital Signs (Most Recent):  Temp: 98.1 °F (36.7 °C) (07/15/19 0400)  Pulse: (!) 119 (07/15/19 0700)  Resp: (!) 27 (07/15/19 0700)  BP: (!) 126/59 (07/15/19 0420)  SpO2: 100 % (07/15/19 0700) Vital Signs (24h Range):  Temp:  [97.9 °F (36.6 °C)-99.6 °F (37.6 °C)] 98.1 °F (36.7 °C)  Pulse:  [] 119  Resp:  [20-39] 27  SpO2:  [97 %-100 %] 100 %  BP: ()/(50-85) 126/59  Arterial Line BP: ()/(41-76) 145/56      Weight: 62.1 kg (137 lb)  Body mass index is 21.46 kg/m².      Intake/Output Summary (Last 24 hours) at 7/15/2019 0724  Last data filed at 7/15/2019 0500  Gross per 24 hour   Intake 1060 ml   Output 1385 ml   Net -325 ml       Physical Exam    Vents:  Vent Mode: A/C (07/09/19 0927)  Ventilator Initiated: Yes (07/07/19 2318)  Set Rate: 16 bmp (07/09/19 0927)  Vt Set: 380 mL (07/09/19 0927)  PEEP/CPAP: 5 cmH20 (07/09/19 0927)  Oxygen Concentration (%): 24 (07/12/19 0846)  Peak Airway Pressure: 20 cmH2O (07/09/19 0927)  Plateau Pressure: 12 cmH20 (07/09/19 0927)  Total Ve: 3.77 mL (07/09/19 0927)  F/VT Ratio<105 (RSBI): 1461.54 (07/09/19 0927)    Lines/Drains/Airways     Drain                 Urethral Catheter 07/12/19 1400 2 days          Peripheral Intravenous Line                 Peripheral IV - Single Lumen 20 G Left Forearm -- days         Peripheral IV - Single Lumen 07/13/19 2300 22 G Right Forearm 1 day                Significant Labs:    CBC/Anemia Profile:  Recent Labs   Lab 07/14/19  0400 07/14/19  2346 07/15/19  0324   WBC 5.82 9.35 8.95   HGB 9.4* 10.9* 10.8*   HCT 29.4* 33.7* 33.5*   * 197 194   MCV 80* 79* 79*   RDW 15.8* 15.6* 15.7*        Chemistries:  Recent Labs   Lab 07/14/19  0400 07/14/19  1615 07/15/19  0324   * 134*  134* 133*   K 3.8 4.3  4.3  4.3 4.3    102  102 101   CO2 24 25  25 23   BUN 23 22  22 21   CREATININE 1.0 1.0  1.0 1.0   CALCIUM 8.4* 8.8  8.8 9.2   ALBUMIN 2.0* 2.0*  2.0* 2.3*   PROT 5.7* 6.0  6.0 6.7   BILITOT 0.4 0.3  0.3 0.5   ALKPHOS 77 81  81 90   ALT 22 29  29 30   AST 25 40  40 31   MG 2.5 2.1 1.9   PHOS 3.3 3.5 2.9       Significant Imaging:  I have reviewed all pertinent imaging results/findings within the past 24 hours.    Assessment/Plan:     Aneurysm of infrarenal abdominal aorta  63-year-old male with acute on chronic CHF exacerbation found to have a AAA enlarged to greater than 6cm from previous now s/p EVAR.     Neuro:  -Weaned  off of propofol and fentanyl  -started on nicotine patch  -Librium taper  -PRN haldol and ativan required for agitation.     Cards:  S/p EVAR  Mild tachycardia and hypertension. Received PRN metoprolol and hydralazine  CHF exacerbation resolved  -Echo 7/8: 25% EF  -keep systolics less than 140 and HR <100  -Off pressors  -clopidogrel/ASA  -started of carvedilol and losartan    Resp:  -Extubated and on room air  -moraxella PNA on BAL    FENGI  -regular diet  -replace lytes as needed  -GI ppx    Heme:  CBC stable. Doing well.   -trend H/H  -DVT ppx    Renal:  Baseline CKD with admitting creatinine of 1.4.   -Replaced arreola for urinary retention.   -Started on home flomax  -trend renal function, stable    Endo:  Blood sugars stable. No history of diabetes.   -monitor blood sugar.     ID:  -Resp cx positve for M. Catarrhalis   -Started on Augmentin  -trend white count    Dispo: Stepdown orders in         Lexis Choudhury MD  Critical Care - Surgery  [facility]

## 2019-07-15 NOTE — PT/OT/SLP PROGRESS
Speech Language Pathology Treatment  Discharge    Patient Name:  Yogesh Lima   MRN:  [id number]   [id number]    Admitting Diagnosis: AAA (abdominal aortic aneurysm) without rupture    Recommendations:                 General Recommendations:  Follow-up not indicated  Diet recommendations:  Regular, Liquid Diet Level: Thin   Aspiration Precautions: Assistance setting up meal tray, standard aspiration precautions   General Precautions: Standard, fall  Communication strategies:  none    Subjective     · Patient found awake and feeding self breakfast tray. RN present in room at beginning of session.     Objective:     Has the patient been evaluated by SLP for swallowing?   Yes  Keep patient NPO? No   Current Respiratory Status: room air      Upon SLP entry, patient placed entire sausage mickey in mouth despite RN cue to take a smaller bite. Patient presented with excess chewing 2/2 large bite taken, however, no overt s/s of aspiration noted. Patient also assessed with multiple sips of juice and coffee via cup and straw. No overt s/s of aspiration or difficulties swallowing all trials. Patient denies globus sensation or difficulties during previous meals over the weekend. Education provided on SLP recommendations, SLP role, s/s and risks of aspiration, safe swallow precautions, and d/c from ST services. Patient demonstrated understanding. White board current.      Assessment:     Yogesh Lima is a 64 y.o. male tolerating a regular diet and thin liquids with no overt s/s of aspiration with all consistencies trailed. No further skilled acute ST services warranted at this time. Please re-consult as needed.       Goals:   Multidisciplinary Problems     SLP Goals     Not on file          Multidisciplinary Problems (Resolved)        Problem: SLP Goal    Goal Priority Disciplines Outcome   SLP Goal   (Resolved)     SLP Outcome(s) achieved   Description:  Speech Language Pathology Goals  Goals expected to be met by 7/19:  1.  Patient will tolerate a regular consistency diet and thin liquids with no overt signs of airway compromise.                           Plan:     · Plan of Care reviewed with:  patient   · SLP Follow-Up:  No        Discharge recommendations:  nursing facility, skilled   Barriers to Discharge:  None per ST discipline    Time Tracking:     SLP Treatment Date:   07/15/19  Speech Start Time:  0831  Speech Stop Time:  0841     Speech Total Time (min):  10 min    Billable Minutes: Treatment Swallowing Dysfunction 10    CANDE Garcia, CCC-SLP   Pager: [phone]  07/15/2019

## 2019-07-15 NOTE — SUBJECTIVE & OBJECTIVE
Interval History/Significant Events: Pt received home trazodone overnight. Minimal agitation. Remained tachycardic w/ intermittent increased BP. Received prn metoprolol and hydralazine. UO adequate.     Follow-up For: Procedure(s) (LRB):  REPAIR-ANEURYSM-ABDOMINAL AORTIC-ENDOVASCULAR (AAA) (Bilateral)    Post-Operative Day: 8 Days Post-Op    Objective:     Vital Signs (Most Recent):  Temp: 98.1 °F (36.7 °C) (07/15/19 0400)  Pulse: (!) 119 (07/15/19 0700)  Resp: (!) 27 (07/15/19 0700)  BP: (!) 126/59 (07/15/19 0420)  SpO2: 100 % (07/15/19 0700) Vital Signs (24h Range):  Temp:  [97.9 °F (36.6 °C)-99.6 °F (37.6 °C)] 98.1 °F (36.7 °C)  Pulse:  [] 119  Resp:  [20-39] 27  SpO2:  [97 %-100 %] 100 %  BP: ()/(50-85) 126/59  Arterial Line BP: ()/(41-76) 145/56     Weight: 62.1 kg (137 lb)  Body mass index is 21.46 kg/m².      Intake/Output Summary (Last 24 hours) at 7/15/2019 0724  Last data filed at 7/15/2019 0500  Gross per 24 hour   Intake 1060 ml   Output 1385 ml   Net -325 ml       Physical Exam    Vents:  Vent Mode: A/C (07/09/19 0927)  Ventilator Initiated: Yes (07/07/19 2318)  Set Rate: 16 bmp (07/09/19 0927)  Vt Set: 380 mL (07/09/19 0927)  PEEP/CPAP: 5 cmH20 (07/09/19 0927)  Oxygen Concentration (%): 24 (07/12/19 0846)  Peak Airway Pressure: 20 cmH2O (07/09/19 0927)  Plateau Pressure: 12 cmH20 (07/09/19 0927)  Total Ve: 3.77 mL (07/09/19 0927)  F/VT Ratio<105 (RSBI): 1461.54 (07/09/19 0927)    Lines/Drains/Airways     Drain                 Urethral Catheter 07/12/19 1400 2 days          Peripheral Intravenous Line                 Peripheral IV - Single Lumen 20 G Left Forearm -- days         Peripheral IV - Single Lumen 07/13/19 2300 22 G Right Forearm 1 day                Significant Labs:    CBC/Anemia Profile:  Recent Labs   Lab 07/14/19  0400 07/14/19  2346 07/15/19  0324   WBC 5.82 9.35 8.95   HGB 9.4* 10.9* 10.8*   HCT 29.4* 33.7* 33.5*   * 197 194   MCV 80* 79* 79*   RDW 15.8* 15.6*  15.7*        Chemistries:  Recent Labs   Lab 07/14/19  0400 07/14/19  1615 07/15/19  0324   * 134*  134* 133*   K 3.8 4.3  4.3  4.3 4.3    102  102 101   CO2 24 25  25 23   BUN 23 22  22 21   CREATININE 1.0 1.0  1.0 1.0   CALCIUM 8.4* 8.8  8.8 9.2   ALBUMIN 2.0* 2.0*  2.0* 2.3*   PROT 5.7* 6.0  6.0 6.7   BILITOT 0.4 0.3  0.3 0.5   ALKPHOS 77 81  81 90   ALT 22 29  29 30   AST 25 40  40 31   MG 2.5 2.1 1.9   PHOS 3.3 3.5 2.9       Significant Imaging:  I have reviewed all pertinent imaging results/findings within the past 24 hours.

## 2019-07-16 NOTE — PLAN OF CARE
[facility] has accepted Pt.  (SW) will complete set-up once Pt is medically cleared for discharge.    SW will continue to follow and offer support as needed. SW in communication with Pt's , Shant ARAUJO     07/16/19 0902   Post-Acute Status   Post-Acute Authorization Placement   Post-Acute Placement Status Authorization Obtained     Shital Negro, SW  [phone]

## 2019-07-16 NOTE — PT/OT/SLP PROGRESS
Physical Therapy Treatment    Patient Name:  Yogesh Lima   MRN:  [id number]  Admitting Diagnosis: AAA (abdominal aortic aneurysm) without rupture  Recent Surgery: Procedure(s) (LRB):  REPAIR-ANEURYSM-ABDOMINAL AORTIC-ENDOVASCULAR (AAA) (Bilateral) 9 Days Post-Op    Recommendations:     Discharge Recommendations:  nursing facility, skilled   Discharge Equipment Recommendations: walker, rolling   Barriers to discharge: Inaccessible home and Decreased caregiver support    Plan:     During this hospitalization, patient to be seen 4 x/week to address the listed problems via gait training, therapeutic activities, therapeutic exercises, neuromuscular re-education  · Plan of Care Expires:  08/13/19   Plan of Care Reviewed with: patient    Assessment:     Yogesh Lima is a 64 y.o. male admitted with a medical diagnosis of AAA (abdominal aortic aneurysm) without rupture. Pt remains primarily limited due to decreased strength, decreased ability to transfer and increased fall risk. Pt presented with poor affect today and agitated throughout treatment. Pt did not want to return to bed today to ensure pt's safety and decrease risk for falls. Pt remains unsafe to return home at this time due to decreased safety awareness and increased risk for falls. Pt would continue to benefit from skilled PT services in the acute setting to improve functional mobility and tolerance to upright activity. Additional SNF PT services would be appropriate upon discharge to regain independence and return to OF.     Problem List: weakness, gait instability, decreased upper extremity function, decreased ROM, impaired cardiopulmonary response to activity, impaired endurance, impaired balance, decreased lower extremity function, decreased safety awareness, impaired self care skills, pain, impaired functional mobilty.  Rehab Prognosis: Fair     GOALS:   Multidisciplinary Problems     Physical Therapy Goals        Problem: Physical Therapy Goal    Goal  "Priority Disciplines Outcome Goal Variances Interventions   Physical Therapy Goal     PT, PT/OT Ongoing (interventions implemented as appropriate)     Description:  Goals to be met by: 2019     Patient will increase functional independence with mobility by performin. Supine to sit with Set-up [place]  2. Sit to stand transfer with Contact Guard Assistance  3. Bed to chair transfer with Contact Guard Assistance using LRAD  4. Gait  x 25 feet with Contact Guard Assistance using LRAD.   5. Ascend/descend 3 stair with left Handrails Minimal Assistance using LRAD.                       Subjective     Communicated with RN prior to session.  Patient found reclined in bed side chair with R LE on floor upon PT entry to room. Pt's alone present during session.  "Take me to my car so I can go home. God strike these people dead."    Pain/Comfort:  Pain Rating 1: 0/10    Objective:     Patient found with: blood pressure cuff, pulse ox (continuous), peripheral IV, telemetry   Mental Status: Patient is oriented to AxOx2 and follows single-step commands. Patient is lethargic and Agitated during session.    General Precautions: Standard, Cardiac fall   Orthopedic Precautions:N/A   Braces: N/A   Body mass index is 22.17 kg/m².  Oxygen Device: Room Air  Vital Signs :   Vitals remained normal throughout session. Orthostatics within normal limits prior to and post session.     Outcome Measures:  AM-PAC 6 CLICK MOBILITY  Turning over in bed (including adjusting bedclothes, sheets and blankets)?: 1  Sitting down on and standing up from a chair with arms (e.g., wheelchair, bedside commode, etc.): 3  Moving from lying on back to sitting on the side of the bed?: 3  Moving to and from a bed to a chair (including a wheelchair)?: 3  Need to walk in hospital room?: 3  Climbing 3-5 steps with a railing?: 1  Basic Mobility Total Score: 14     Functional Mobility:  Additional staff present: PT  Bed Mobility:   · Scooting to HOB via " bed side chair: total assistance x 1 person, verbal and manual cues required for positioning and technique  · Sit to Supine: moderate assistance; to Left side of bed Assistance of B LE to place in bed.  · Pt found bedside chair returned to bed to ensure pt safety. Pt demonstrated poor posturing in bed side chair leading to an increased risk for falls.    Sitting Balance at Edge of Bed:   Assistance Level Required: Contact Guard Assistance   Time: 1 minute   Postural deviations noted: slouched posture, rounded shoulders, forward head, increased kyphosis, posterior pelvic tilt and increased cervical flexion   Encouraged: Upright posturing   Comments: Pt demonstrated ability to scoot towards HOB with CG and VCs. Poor postural awareness throughout session that did not improve with verbal cueing.     Transfers:   · Sit <> Stand Transfer: minimum assistance with no assistive device   · Stand <> Sit Transfer: minimum assistance with no assistive device   · [id number] from bedside chair  · Chair <> Bed Transfer: Stand Pivot technique with moderate assistance with no assistive device  · Bed on patient's Right  · Comments: Pt required positive encouragement to return to bed. Fixated on going home and did not want to get back in bed. Verbal and manual cues throughout session. Extreme forward flexed posturing of trunk throughout transfer.      Gait:  · Comments: Inappropriate for ambulation today to maintain pt safety and secondary to poor affect and lethargy.     Education:  Pt educated on need for hospital stay to ensure pt's medical status improves. Repetitions needed.     Patient left supine, with head in midline, neutral pelvis & heels floated for skin protection with all lines intact, call button in reach and RN notified. 4 rails up.       Time Tracking:     PT Received On: 07/16/19  PT Start Time: 1015     PT Stop Time: 1034  PT Total Time (min): 19 min       Billable Minutes:   · Therapeutic Activity 19    Treatment  Type: Treatment  PT/PTA: PT       Abdirizak Lan, SPT  7/16/2019

## 2019-07-16 NOTE — NURSING TRANSFER
Nursing Transfer Note      7/16/2019     Transfer To: 1058    Transfer via bed    Transfer with cardiac monitoring    Transported by CONNOR Stevens, JAEL    Medicines sent: micafungal ointment    Chart send with patient: Yes    Notified: patient    Patient reassessed at: 7/16/19, 1455 (date, time)    Upon arrival to floor: cardiac monitor applied, patient oriented to room, call bell in reach and bed in lowest position

## 2019-07-16 NOTE — SUBJECTIVE & OBJECTIVE
Interval History/Significant Events: Pt became hypotensive yesterday (7/15). Received 500cc bolus of NS w/o adequate response and then received 250 of albumin.  A CVC in the RIJ was place and he was started on levophed at 0.08. He remained on levophed for about 2 hours and was weaned off. He became hypertensive (SBP ~155) overnight and received 10mg hydralazine. This caused his MAPs and SBP to drop significantly. He received another bolus of 250 albumin and his levophed was restarted for about 20 min. His PRN hydralazine was changed from 10mg to 5.    Follow-up For: Procedure(s) (LRB):  REPAIR-ANEURYSM-ABDOMINAL AORTIC-ENDOVASCULAR (AAA) (Bilateral)    Post-Operative Day: 9 Days Post-Op    Objective:     Vital Signs (Most Recent):  Temp: 98.3 °F (36.8 °C) (07/16/19 0300)  Pulse: (!) 111 (07/16/19 0630)  Resp: (!) 24 (07/16/19 0630)  BP: 135/67 (07/16/19 0600)  SpO2: 100 % (07/16/19 0630) Vital Signs (24h Range):  Temp:  [98.1 °F (36.7 °C)-98.3 °F (36.8 °C)] 98.3 °F (36.8 °C)  Pulse:  [101-122] 111  Resp:  [20-31] 24  SpO2:  [89 %-100 %] 100 %  BP: ()/(26-78) 135/67  Arterial Line BP: ()/(39-61) 145/58     Weight: 64.2 kg (141 lb 8.6 oz)  Body mass index is 22.17 kg/m².      Intake/Output Summary (Last 24 hours) at 7/16/2019 0728  Last data filed at 7/16/2019 0500  Gross per 24 hour   Intake 1683 ml   Output 1205 ml   Net 478 ml       Physical Exam   Constitutional: He is oriented to person, place, and time. He appears well-developed and well-nourished.   HENT:   Head: Normocephalic and atraumatic.   Neck:   CVC RIJ c/d/i   Cardiovascular: Normal rate and regular rhythm.   Pulmonary/Chest: Effort normal and breath sounds normal.   Abdominal: Soft. Bowel sounds are normal.   Genitourinary:   Genitourinary Comments: Mari cath in place   Neurological: He is alert and oriented to person, place, and time.   Skin: Skin is warm and dry.   Psychiatric:   confused       Lines/Drains/Airways     Central Venous  Catheter Line                 Percutaneous Central Line Insertion/Assessment - triple lumen  07/15/19 1730 right internal jugular less than 1 day          Drain                 Urethral Catheter 07/12/19 1400 3 days          Arterial Line                 Arterial Line 07/08/19 Right Radial 8 days          Peripheral Intravenous Line                 Peripheral IV - Single Lumen 07/13/19 2300 22 G Right Forearm 2 days         Peripheral IV - Single Lumen 07/15/19 1500 20 G Left Forearm less than 1 day                Significant Labs:    CBC/Anemia Profile:  Recent Labs   Lab 07/15/19  0324 07/15/19  1653 07/15/19  1657 07/16/19  0430   WBC 8.95 8.59  --  6.22   HGB 10.8* 10.4*  --  9.4*   HCT 33.5* 32.0* 34* 30.0*    212  --  181   MCV 79* 79*  --  82   RDW 15.7* 15.7*  --  15.9*        Chemistries:  Recent Labs   Lab 07/14/19  1615 07/15/19  0324 07/15/19  1653 07/16/19  0430   *  134* 133* 132* 135*   K 4.3  4.3  4.3 4.3 4.3 4.6     102 101 101 105   CO2 25  25 23 23 21*   BUN 22  22 21 23 22   CREATININE 1.0  1.0 1.0 1.2 1.0   CALCIUM 8.8  8.8 9.2 8.8 9.4   ALBUMIN 2.0*  2.0* 2.3*  --  2.7*   PROT 6.0  6.0 6.7  --  6.4   BILITOT 0.3  0.3 0.5  --  0.5   ALKPHOS 81  81 90  --  72   ALT 29  29 30  --  25   AST 40  40 31  --  23   MG 2.1 1.9 1.9 1.8   PHOS 3.5 2.9 3.7 3.7       ABGs:   Recent Labs   Lab 07/15/19  1657   PH 7.426   PCO2 36.7   HCO3 24.1   POCSATURATED 97   BE 0       Significant Imaging:  I have reviewed all pertinent imaging results/findings within the past 24 hours.

## 2019-07-16 NOTE — SUBJECTIVE & OBJECTIVE
Medications:  Continuous Infusions:    Scheduled Meds:   albuterol-ipratropium  3 mL Nebulization Q4H    aspirin  81 mg Per OG tube Daily    atorvastatin  80 mg Per OG tube Daily    carvedilol  6.25 mg Oral BID    clopidogrel  75 mg Oral Daily    doxazosin  4 mg Oral QHS    folic acid  1 mg Oral Daily    furosemide  20 mg Oral Daily    heparin (porcine)  5,000 Units Subcutaneous Q8H    losartan  12.5 mg Oral QHS    miconazole nitrate 2%   Topical (Top) BID    multivitamin  1 tablet Oral Daily    nicotine  1 patch Transdermal Daily    thiamine  100 mg Oral Daily    traZODone  100 mg Oral QHS     PRN Meds:acetaminophen, Dextrose 10% Bolus, Dextrose 10% Bolus, fentaNYL, hydrALAZINE, HYDROcodone-acetaminophen, HYDROcodone-acetaminophen, magnesium sulfate IVPB, magnesium sulfate IVPB, potassium chloride in water **AND** potassium chloride in water **AND** potassium chloride in water, traZODone     Objective:     Vital Signs (Most Recent):  Temp: 97.4 °F (36.3 °C) (07/16/19 1522)  Pulse: 100 (07/16/19 1532)  Resp: 18 (07/16/19 1522)  BP: (!) 148/70 (07/16/19 1522)  SpO2: 97 % (07/16/19 1522) Vital Signs (24h Range):  Temp:  [97.4 °F (36.3 °C)-98.3 °F (36.8 °C)] 97.4 °F (36.3 °C)  Pulse:  [] 100  Resp:  [17-27] 18  SpO2:  [96 %-100 %] 97 %  BP: ()/(39-83) 148/70  Arterial Line BP: ()/(39-61) 135/52     Date 07/16/19 0700 - 07/17/19 0659   Shift 6614-9360 8635-2138 0588-9503 24 Hour Total   INTAKE   Shift Total(mL/kg)       OUTPUT   Urine(mL/kg/hr) 875(1.7)   875   Shift Total(mL/kg) 875(13.6)   875(13.6)   Weight (kg) 64.2 64.2 64.2 64.2       Physical Exam   Constitutional: He is oriented to person, place, and time. He appears cachectic.   HENT:   Head: Normocephalic and atraumatic.   Nose: Nose normal.   Eyes: Pupils are equal, round, and reactive to light. Conjunctivae and EOM are normal. No scleral icterus.   Neck: Normal range of motion. No thyromegaly present.   Cardiovascular:  Normal rate and regular rhythm. Exam reveals no gallop and no friction rub.   No murmur heard.  Pulses:       Radial pulses are 2+ on the right side, and 2+ on the left side.        Femoral pulses are 2+ on the right side, and 2+ on the left side.  PTs dopplerable in bilateral legs  DP with weak monophasic signal in right leg, non-dopplerable in left   Pulmonary/Chest:   On nasal cannula    Abdominal: Soft. He exhibits no distension. Mass:    There is no tenderness.   Genitourinary:   Genitourinary Comments: Bilateral groin sites c/d/i; no evidence of hematoma    Musculoskeletal: Normal range of motion.   Lymphadenopathy:     He has no cervical adenopathy.   Neurological: He is alert and oriented to person, place, and time.   Skin: Skin is warm and dry. No rash noted.       Significant Labs:  CBC:   Recent Labs   Lab 07/16/19  0430   WBC 6.22   RBC 3.66*   HGB 9.4*   HCT 30.0*      MCV 82   MCH 25.7*   MCHC 31.3*     CMP:   Recent Labs   Lab 07/16/19  0430   GLU 84   CALCIUM 9.4   ALBUMIN 2.7*   PROT 6.4   *   K 4.6   CO2 21*      BUN 22   CREATININE 1.0   ALKPHOS 72   ALT 25   AST 23   BILITOT 0.5     Significant Diagnostics:  I have reviewed all pertinent imaging results/findings within the past 24 hours.

## 2019-07-16 NOTE — ASSESSMENT & PLAN NOTE
62yo M with multiple medical co-morbidities who presented as transfer from OSH given symptomatic, enlarging AAA now s/p emergent EVAR on 7/7    Neuro: Agitation resolved. Librium taper started 7/10. Precedex weaned.   Resp: Good O2 sat on room air. duonebs q4 in setting of known COPD, encourage IS and OOTB  CV: TTE with evidence of CHF, EF 25%, BNP significantly elevated when checked, Now off dobutamine. Continue daily ASA, statin; SBP > 140, HR > 100; Increased coreg 6.5 and losartan 12.5 qd.   FEN/GI: reg diet, replete lytes   Renal: Failed voiding trial yesterday and arreola was replaced, Urology consult for recommendations  Heme/ID: H/H stable, no leukocytosis, resp cultures with moraxella - augmentin per ID for 5 days  Endo: POCT blood glucose, no current issues, continue to monitor  Msk: No issues, continue to monitor  Ppx: [facility]    Dispo: stepdown today 7/16, PT/OT/SW. Cousin reportedly has power of  and wants him to go to nursing home, but patient states he wants to go back to his trailer. He has been accepted to a SNF.

## 2019-07-16 NOTE — PLAN OF CARE
Problem: Adult Inpatient Plan of Care  Goal: Plan of Care Review  Outcome: Ongoing (interventions implemented as appropriate)  Patient on room air, MAPs maintained above 60 through shift, 250 mL albumin given for MAP of 58, MAPS stabilized since then. Patient reported pain at start of shift, PRNs given. Urinary output 35-70 mL/hr. Patient out of bed to chair in A.M.. Patient AAOx4 but remains confused in AM. Al questions answered by RN, will continue to monitor.

## 2019-07-16 NOTE — PROGRESS NOTES
[facility]  Critical Care - Surgery  Progress Note    Patient Name: Yogesh Lima  MRN: [id number]  Admission Date: 7/7/2019  Hospital Length of Stay: 9 days  Code Status: Prior  Attending Provider: Hilton Hanson MD  Primary Care Provider: [facility]   Principal Problem: AAA (abdominal aortic aneurysm) without rupture    Subjective:     Hospital/ICU Course:  63-year-old male with history of HTN, HLD, current smoker, CAD s/p multi-vessel CABG (1993), heart failure, COPD, and known AAA (discovered April 2019)  who presented as transfer from OSH given symptomatic enlarging AAA. He presented to OSH this evening given abdominal pain of several days, worsening in nature, worst in LLQ and radiating to his back. He also was complaining of bilateral lower extremity edema, shortness of breath and dyspnea on exertion. BNP was 16,200. CT abdomen was performed given abdominal pain and history of AAA and his aneurysm was noted to be enlarged to 6.1cm from previous diameter of 4.8cm. Given this he was transferred to [facility] [place] [place] for emergent vascular surgery intervention. He's now s/p EVAR and remains intubated off pressors.     Interval History/Significant Events: Pt became hypotensive yesterday (7/15). Received 500cc bolus of NS w/o adequate response and then received 250 of albumin.  A CVC in the RIJ was place and he was started on levophed at 0.08. He remained on levophed for about 2 hours and was weaned off. He became hypertensive (SBP ~155) overnight and received 10mg hydralazine. This caused his MAPs and SBP to drop significantly. He received another bolus of 250 albumin and his levophed was restarted for about 20 min. His PRN hydralazine was changed from 10mg to 5.    Follow-up For: Procedure(s) (LRB):  REPAIR-ANEURYSM-ABDOMINAL AORTIC-ENDOVASCULAR (AAA) (Bilateral)    Post-Operative Day: 9 Days Post-Op    Objective:     Vital Signs (Most Recent):  Temp: 98.3 °F (36.8 °C) (07/16/19  0300)  Pulse: (!) 111 (07/16/19 0630)  Resp: (!) 24 (07/16/19 0630)  BP: 135/67 (07/16/19 0600)  SpO2: 100 % (07/16/19 0630) Vital Signs (24h Range):  Temp:  [98.1 °F (36.7 °C)-98.3 °F (36.8 °C)] 98.3 °F (36.8 °C)  Pulse:  [101-122] 111  Resp:  [20-31] 24  SpO2:  [89 %-100 %] 100 %  BP: ()/(26-78) 135/67  Arterial Line BP: ()/(39-61) 145/58     Weight: 64.2 kg (141 lb 8.6 oz)  Body mass index is 22.17 kg/m².      Intake/Output Summary (Last 24 hours) at 7/16/2019 0728  Last data filed at 7/16/2019 0500  Gross per 24 hour   Intake 1683 ml   Output 1205 ml   Net 478 ml       Physical Exam   Constitutional: He is oriented to person, place, and time. He appears well-developed and well-nourished.   HENT:   Head: Normocephalic and atraumatic.   Neck:   CVC RIJ c/d/i   Cardiovascular: Normal rate and regular rhythm.   Pulmonary/Chest: Effort normal and breath sounds normal.   Abdominal: Soft. Bowel sounds are normal.   Genitourinary:   Genitourinary Comments: Mari cath in place   Neurological: He is alert and oriented to person, place, and time.   Skin: Skin is warm and dry.   Psychiatric:   confused       Lines/Drains/Airways     Central Venous Catheter Line                 Percutaneous Central Line Insertion/Assessment - triple lumen  07/15/19 1730 right internal jugular less than 1 day          Drain                 Urethral Catheter 07/12/19 1400 3 days          Arterial Line                 Arterial Line 07/08/19 Right Radial 8 days          Peripheral Intravenous Line                 Peripheral IV - Single Lumen 07/13/19 2300 22 G Right Forearm 2 days         Peripheral IV - Single Lumen 07/15/19 1500 20 G Left Forearm less than 1 day                Significant Labs:    CBC/Anemia Profile:  Recent Labs   Lab 07/15/19  0324 07/15/19  1653 07/15/19  1657 07/16/19  0430   WBC 8.95 8.59  --  6.22   HGB 10.8* 10.4*  --  9.4*   HCT 33.5* 32.0* 34* 30.0*    212  --  181   MCV 79* 79*  --  82   RDW 15.7*  15.7*  --  15.9*        Chemistries:  Recent Labs   Lab 07/14/19  1615 07/15/19  0324 07/15/19  1653 07/16/19  0430   *  134* 133* 132* 135*   K 4.3  4.3  4.3 4.3 4.3 4.6     102 101 101 105   CO2 25  25 23 23 21*   BUN 22  22 21 23 22   CREATININE 1.0  1.0 1.0 1.2 1.0   CALCIUM 8.8  8.8 9.2 8.8 9.4   ALBUMIN 2.0*  2.0* 2.3*  --  2.7*   PROT 6.0  6.0 6.7  --  6.4   BILITOT 0.3  0.3 0.5  --  0.5   ALKPHOS 81  81 90  --  72   ALT 29  29 30  --  25   AST 40  40 31  --  23   MG 2.1 1.9 1.9 1.8   PHOS 3.5 2.9 3.7 3.7       ABGs:   Recent Labs   Lab 07/15/19  1657   PH 7.426   PCO2 36.7   HCO3 24.1   POCSATURATED 97   BE 0       Significant Imaging:  I have reviewed all pertinent imaging results/findings within the past 24 hours.    Assessment/Plan:     Aneurysm of infrarenal abdominal aorta  63-year-old male with acute on chronic CHF exacerbation found to have a AAA enlarged to greater than 6cm from previous now s/p EVAR.     Neuro:  -Weaned off of propofol and fentanyl  -continue nicotine patch  -Librium taper will be completed today  -PRN percocet and fentanyl    Cards:  S/p EVAR  -Echo 7/8: 25% EF  -keep systolics less than 140 and HR <100  -Weaned off levophed overnight  -clopidogrel/ASA/statin  -Carvedilol at 6.25 and losartan    Resp:  -Extubated and on room air  -moraxella PNA on BAL    FENGI  -regular diet  -replace lytes as needed    Heme:  CBC stable. Doing well.   -trend H/H  -DVT ppx - Heparin 5000U TID    Renal:  Baseline CKD with admitting creatinine of 1.4.   -Replaced arreola for urinary retention.   -Home flomax changed to doxazosin   -trend renal function, stable    Endo:  Blood sugars stable. No history of diabetes.   -monitor blood sugar.     ID:  -Resp cx positve for M. Catarrhalis   -Completed 5 day course of augmentin  -trend white count    Dispo: Pt will be transferred to [facility]        Lexis Choudhury MD  Critical Care - Surgery  [facility]  [place]

## 2019-07-16 NOTE — PLAN OF CARE
Problem: Physical Therapy Goal  Goal: Physical Therapy Goal  Goals to be met by: 2019     Patient will increase functional independence with mobility by performin. Supine to sit with Set-up Tibbie  2. Sit to stand transfer with Contact Guard Assistance  3. Bed to chair transfer with Contact Guard Assistance using LRAD  4. Gait  x 25 feet with Contact Guard Assistance using LRAD.   5. Ascend/descend 3 stair with left Handrails Minimal Assistance using LRAD.      Outcome: Ongoing (interventions implemented as appropriate)    Discharge Recommendation: SS-SNF.  Please continue Progressive Mobility Protocol as appropriate.  Appropriate transfer level with nursing staff: Bed <> Chair:  Stand Pivot with minimum assistance with No Assistive Device.    Abdirizak Lan  2019

## 2019-07-16 NOTE — PLAN OF CARE
Problem: Adult Inpatient Plan of Care  Goal: Plan of Care Review  Outcome: Ongoing (interventions implemented as appropriate)  Oriented to self only. Disoriented to time, place and situation. AVSS on RA. Incision sites c/d/i. No C/O SOB, CP, or N/V. No falls or acute events on this shift. Mari draining CYU. HR 99 in NSR-ST. Bed alarm on pt d/t confusion. WCTM.

## 2019-07-16 NOTE — PROGRESS NOTES
[facility]  Vascular Surgery  Progress Note    Patient Name: Yogesh Lima  MRN: [id number]  Admission Date: 7/7/2019  Primary Care Provider: [facility]    Subjective:     Interval History: No acute events overnight. He was sitting up in the chair this morning. He is tolerating a regular diet and his pain is well-controlled.     Post-Op Info:  Procedure(s) (LRB):  REPAIR-ANEURYSM-ABDOMINAL AORTIC-ENDOVASCULAR (AAA) (Bilateral)   9 Days Post-Op       Medications:  Continuous Infusions:    Scheduled Meds:   albuterol-ipratropium  3 mL Nebulization Q4H    aspirin  81 mg Per OG tube Daily    atorvastatin  80 mg Per OG tube Daily    carvedilol  6.25 mg Oral BID    clopidogrel  75 mg Oral Daily    doxazosin  4 mg Oral QHS    folic acid  1 mg Oral Daily    furosemide  20 mg Oral Daily    heparin (porcine)  5,000 Units Subcutaneous Q8H    losartan  12.5 mg Oral QHS    miconazole nitrate 2%   Topical (Top) BID    multivitamin  1 tablet Oral Daily    nicotine  1 patch Transdermal Daily    thiamine  100 mg Oral Daily    traZODone  100 mg Oral QHS     PRN Meds:acetaminophen, Dextrose 10% Bolus, Dextrose 10% Bolus, fentaNYL, hydrALAZINE, HYDROcodone-acetaminophen, HYDROcodone-acetaminophen, magnesium sulfate IVPB, magnesium sulfate IVPB, potassium chloride in water **AND** potassium chloride in water **AND** potassium chloride in water, traZODone     Objective:     Vital Signs (Most Recent):  Temp: 97.4 °F (36.3 °C) (07/16/19 1522)  Pulse: 100 (07/16/19 1532)  Resp: 18 (07/16/19 1522)  BP: (!) 148/70 (07/16/19 1522)  SpO2: 97 % (07/16/19 1522) Vital Signs (24h Range):  Temp:  [97.4 °F (36.3 °C)-98.3 °F (36.8 °C)] 97.4 °F (36.3 °C)  Pulse:  [] 100  Resp:  [17-27] 18  SpO2:  [96 %-100 %] 97 %  BP: ()/(39-83) 148/70  Arterial Line BP: ()/(39-61) 135/52     Date 07/16/19 0700 - 07/17/19 0659   Shift [id number] 9139-9452 5459-2355 24 Hour Total   INTAKE   Shift  Total(mL/kg)       OUTPUT   Urine(mL/kg/hr) 875(1.7)   875   Shift Total(mL/kg) 875(13.6)   875(13.6)   Weight (kg) 64.2 64.2 64.2 64.2       Physical Exam   Constitutional: He is oriented to person, place, and time. He appears cachectic.   HENT:   Head: Normocephalic and atraumatic.   Nose: Nose normal.   Eyes: Pupils are equal, round, and reactive to light. Conjunctivae and EOM are normal. No scleral icterus.   Neck: Normal range of motion. No thyromegaly present.   Cardiovascular: Normal rate and regular rhythm. Exam reveals no gallop and no friction rub.   No murmur heard.  Pulses:       Radial pulses are 2+ on the right side, and 2+ on the left side.        Femoral pulses are 2+ on the right side, and 2+ on the left side.  PTs dopplerable in bilateral legs  DP with weak monophasic signal in right leg, non-dopplerable in left   Pulmonary/Chest:   On nasal cannula    Abdominal: Soft. He exhibits no distension. Mass:    There is no tenderness.   Genitourinary:   Genitourinary Comments: Bilateral groin sites c/d/i; no evidence of hematoma    Musculoskeletal: Normal range of motion.   Lymphadenopathy:     He has no cervical adenopathy.   Neurological: He is alert and oriented to person, place, and time.   Skin: Skin is warm and dry. No rash noted.       Significant Labs:  CBC:   Recent Labs   Lab 07/16/19  0430   WBC 6.22   RBC 3.66*   HGB 9.4*   HCT 30.0*      MCV 82   MCH 25.7*   MCHC 31.3*     CMP:   Recent Labs   Lab 07/16/19  0430   GLU 84   CALCIUM 9.4   ALBUMIN 2.7*   PROT 6.4   *   K 4.6   CO2 21*      BUN 22   CREATININE 1.0   ALKPHOS 72   ALT 25   AST 23   BILITOT 0.5     Significant Diagnostics:  I have reviewed all pertinent imaging results/findings within the past 24 hours.    Assessment/Plan:     Aneurysm of infrarenal abdominal aorta  62yo M with multiple medical co-morbidities who presented as transfer from OSH given symptomatic, enlarging AAA now s/p emergent EVAR on 7/7    Neuro:  Agitation resolved. Librium taper started 7/10. Precedex weaned.   Resp: Good O2 sat on room air. duonebs q4 in setting of known COPD, encourage IS and OOTB  CV: TTE with evidence of CHF, EF 25%, BNP significantly elevated when checked, Now off dobutamine. Continue daily ASA, statin; SBP (110s to 140s) and HR stabilized; on coreg 6.5 and losartan 12.5 qd.   FEN/GI: reg diet, replete lytes   Renal: Mari in place, Urology consult for recommendations  Heme/ID: H/H stable, no leukocytosis, resp cultures with moraxella - augmentin per ID for 5 days  Endo: POCT blood glucose, no current issues, continue to monitor  Msk: No issues, continue to monitor  Ppx: [facility]    Dispo: stepdown today 7/16, PT/OT/SW. Cousin reportedly has power of  and wants him to go to nursing home, but patient states he wants to go back to his trailer. He has been accepted to a SNF.               Jonathan Russo MD  Vascular Surgery  [facility]

## 2019-07-16 NOTE — ASSESSMENT & PLAN NOTE
63-year-old male with acute on chronic CHF exacerbation found to have a AAA enlarged to greater than 6cm from previous now s/p EVAR.     Neuro:  -Weaned off of propofol and fentanyl  -continue nicotine patch  -Librium taper will be completed today  -PRN percocet and fentanyl    Cards:  S/p EVAR  -Echo 7/8: 25% EF  -keep systolics less than 140 and HR <100  -Weaned off levophed overnight  -clopidogrel/ASA/statin  -Carvedilol at 6.25 and losartan    Resp:  -Extubated and on room air  -moraxella PNA on BAL    FENGI  -regular diet  -replace lytes as needed    Heme:  CBC stable. Doing well.   -trend H/H  -DVT ppx - Heparin 5000U TID    Renal:  Baseline CKD with admitting creatinine of 1.4.   -Replaced arreola for urinary retention.   -Home flomax changed to doxazosin   -trend renal function, stable    Endo:  Blood sugars stable. No history of diabetes.   -monitor blood sugar.     ID:  -Resp cx positve for M. Catarrhalis   -Completed 5 day course of augmentin  -trend white count    Dispo: Pt will be transferred to SNF

## 2019-07-16 NOTE — NURSING
Pt became extremely confused around 1755; bed alarm summoned staff to bedside. Pt found OOB in chair removing telemetry and attempting to leave hospital. Pt became angry and combative with staff upon attempts at redirection and cursed at nurse. MD called; MD arrived at bedside. Verbal order with read back for 5 mg Haloperidol given to pt. Pt placed back in bed; 3 side rails raised. Bed alarm on. [employer] camera ordered and placed in room. [facility].

## 2019-07-17 NOTE — SUBJECTIVE & OBJECTIVE
Medications:  Continuous Infusions:  Scheduled Meds:   albuterol-ipratropium  3 mL Nebulization Q4H    aspirin  81 mg Per OG tube Daily    atorvastatin  80 mg Per OG tube Daily    carvedilol  6.25 mg Oral BID    clopidogrel  75 mg Oral Daily    doxazosin  4 mg Oral QHS    folic acid  1 mg Oral Daily    furosemide  20 mg Oral Daily    heparin (porcine)  5,000 Units Subcutaneous Q8H    losartan  12.5 mg Oral QHS    miconazole nitrate 2%   Topical (Top) BID    multivitamin  1 tablet Oral Daily    nicotine  1 patch Transdermal Daily    sulfamethoxazole-trimethoprim 800-160mg  1 tablet Oral BID    thiamine  100 mg Oral Daily    traZODone  100 mg Oral QHS     PRN Meds:acetaminophen, labetalol     Objective:     Vital Signs (Most Recent):  Temp: 98.8 °F (37.1 °C) (07/17/19 0717)  Pulse: (!) 112 (07/17/19 0749)  Resp: 19 (07/17/19 0749)  BP: 122/62 (07/17/19 0717)  SpO2: (!) 93 % (07/17/19 0749) Vital Signs (24h Range):  Temp:  [97.4 °F (36.3 °C)-98.8 °F (37.1 °C)] 98.8 °F (37.1 °C)  Pulse:  [] 112  Resp:  [16-23] 19  SpO2:  [93 %-100 %] 93 %  BP: (109-161)/(57-82) 122/62         Physical Exam   Constitutional: He is oriented to person, place, and time. He appears cachectic.   HENT:   Head: Normocephalic and atraumatic.   Nose: Nose normal.   Eyes: Pupils are equal, round, and reactive to light. Conjunctivae and EOM are normal. No scleral icterus.   Neck: Normal range of motion. No thyromegaly present.   Cardiovascular: Normal rate and regular rhythm. Exam reveals no gallop and no friction rub.   No murmur heard.  Pulses:       Radial pulses are 2+ on the right side, and 2+ on the left side.        Femoral pulses are 2+ on the right side, and 2+ on the left side.  PTs dopplerable in bilateral legs  DP with weak monophasic signal in right leg, non-dopplerable in left   Abdominal: Soft. He exhibits no distension. Mass:    There is no tenderness.   Genitourinary:   Genitourinary Comments: Bilateral  groin sites c/d/i; no evidence of hematoma    Musculoskeletal: Normal range of motion.   Lymphadenopathy:     He has no cervical adenopathy.   Neurological: He is alert and oriented to person, place, and time.   Skin: Skin is warm and dry. No rash noted.       Significant Labs:  CBC:   Recent Labs   Lab 07/17/19 0329   WBC 5.30   RBC 3.95*   HGB 10.0*   HCT 31.8*      MCV 81*   MCH 25.3*   MCHC 31.4*     CMP:   Recent Labs   Lab 07/17/19 0329   GLU 75   CALCIUM 9.6   ALBUMIN 2.6*   PROT 6.6   *   K 4.6   CO2 23      BUN 21   CREATININE 1.0   ALKPHOS 75   ALT 23   AST 21   BILITOT 0.5       Significant Diagnostics:  I have reviewed all pertinent imaging results/findings within the past 24 hours.

## 2019-07-17 NOTE — PROGRESS NOTES
[facility]  Vascular Surgery  Progress Note    Patient Name: Yogesh Lima  MRN: [id number]  Admission Date: 7/7/2019  Primary Care Provider: [facility]    Subjective:     Interval History: Patient stepped out of ICU to floor bed yesterday, has been stable without need for vasopressors for last 36hrs. Was somewhat agitated yesterday evening but remains redirectable. No ongoing surgical issues    Post-Op Info:  Procedure(s) (LRB):  REPAIR-ANEURYSM-ABDOMINAL AORTIC-ENDOVASCULAR (AAA) (Bilateral)   10 Days Post-Op       Medications:  Continuous Infusions:  Scheduled Meds:   albuterol-ipratropium  3 mL Nebulization Q4H    aspirin  81 mg Per OG tube Daily    atorvastatin  80 mg Per OG tube Daily    carvedilol  6.25 mg Oral BID    clopidogrel  75 mg Oral Daily    doxazosin  4 mg Oral QHS    folic acid  1 mg Oral Daily    furosemide  20 mg Oral Daily    heparin (porcine)  5,000 Units Subcutaneous Q8H    losartan  12.5 mg Oral QHS    miconazole nitrate 2%   Topical (Top) BID    multivitamin  1 tablet Oral Daily    nicotine  1 patch Transdermal Daily    sulfamethoxazole-trimethoprim 800-160mg  1 tablet Oral BID    thiamine  100 mg Oral Daily    traZODone  100 mg Oral QHS     PRN Meds:acetaminophen, labetalol     Objective:     Vital Signs (Most Recent):  Temp: 98.8 °F (37.1 °C) (07/17/19 0717)  Pulse: (!) 112 (07/17/19 0749)  Resp: 19 (07/17/19 0749)  BP: 122/62 (07/17/19 0717)  SpO2: (!) 93 % (07/17/19 0749) Vital Signs (24h Range):  Temp:  [97.4 °F (36.3 °C)-98.8 °F (37.1 °C)] 98.8 °F (37.1 °C)  Pulse:  [] 112  Resp:  [16-23] 19  SpO2:  [93 %-100 %] 93 %  BP: (109-161)/(57-82) 122/62         Physical Exam   Constitutional: He is oriented to person, place, and time. He appears cachectic.   HENT:   Head: Normocephalic and atraumatic.   Nose: Nose normal.   Eyes: Pupils are equal, round, and reactive to light. Conjunctivae and EOM are normal. No scleral icterus.    Neck: Normal range of motion. No thyromegaly present.   Cardiovascular: Normal rate and regular rhythm. Exam reveals no gallop and no friction rub.   No murmur heard.  Pulses:       Radial pulses are 2+ on the right side, and 2+ on the left side.        Femoral pulses are 2+ on the right side, and 2+ on the left side.  PTs dopplerable in bilateral legs  DP with weak monophasic signal in right leg, non-dopplerable in left   Abdominal: Soft. He exhibits no distension. Mass:    There is no tenderness.   Genitourinary:   Genitourinary Comments: Bilateral groin sites c/d/i; no evidence of hematoma    Musculoskeletal: Normal range of motion.   Lymphadenopathy:     He has no cervical adenopathy.   Neurological: He is alert and oriented to person, place, and time.   Skin: Skin is warm and dry. No rash noted.       Significant Labs:  CBC:   Recent Labs   Lab 07/17/19  0329   WBC 5.30   RBC 3.95*   HGB 10.0*   HCT 31.8*      MCV 81*   MCH 25.3*   MCHC 31.4*     CMP:   Recent Labs   Lab 07/17/19  0329   GLU 75   CALCIUM 9.6   ALBUMIN 2.6*   PROT 6.6   *   K 4.6   CO2 23      BUN 21   CREATININE 1.0   ALKPHOS 75   ALT 23   AST 21   BILITOT 0.5       Significant Diagnostics:  I have reviewed all pertinent imaging results/findings within the past 24 hours.    Assessment/Plan:     Aneurysm of infrarenal abdominal aorta  64yo M with multiple medical co-morbidities who presented as transfer from OSH given symptomatic, enlarging AAA now s/p emergent EVAR on 7/7    - Librium taper complete, doing well off sedation medications, continue home trazodone at night for sleep  - Maintaining sats on room air, continue duo-neb treatments while in-house, IS and OOBTC for improved respiratory effort  - TTE with evidence of CHF, EF 25%, has been stable off vasopressors for >36hrs. Continue daily ASA, statin, coreg 6.5 and losartan 12.5 qd.   - Regular diet  - Continue arreola on discharge, follow up with urology as outpatient  for retention, Bactrim while arreola in place per Dr. Hanson, continue flomax and doxazosin  - Completed course of augmentin for moraxella, no further antibiotics necessary at this time  - Continue SQH while in-house     Dispo: nursing home acceptance, ok for transfer when transport secured              Carmen Ng MD  Vascular Surgery  [facility]

## 2019-07-17 NOTE — ASSESSMENT & PLAN NOTE
64yo M with multiple medical co-morbidities who presented as transfer from OSH given symptomatic, enlarging AAA now s/p emergent EVAR on 7/7    - Librium taper complete, doing well off sedation medications, continue home trazodone at night for sleep  - Maintaining sats on room air, continue duo-neb treatments while in-house, IS and OOBTC for improved respiratory effort  - TTE with evidence of CHF, EF 25%, has been stable off vasopressors for >36hrs. Continue daily ASA, statin, coreg 6.5 and losartan 12.5 qd.   - Regular diet  - Continue arreola on discharge, follow up with urology as outpatient for retention, Bactrim while arreola in place per Dr. Hanson, continue flomax and doxazosin  - Completed course of augmentin for moraxella, no further antibiotics necessary at this time  - Continue SQH while in-house     Dispo: nursing home acceptance, ok for transfer when transport secured

## 2019-07-17 NOTE — PLAN OF CARE
(SW) attempted to meet with Pt at bedside to confirm discharge plan, however Pt was sedated. SW called Pt's POA, Angie Rivas, and asked her if she was still in agreement with Pt's discharge plan to go to [facility]; Angie said yes.    SW called Keira at [facility] (p:[phone]) who confirmed that they have reviewing the NH Orders and can complete intake today. Keira provided the number for nurse to call report: [phone], and ask for [place].    MIGUEL met with Pt's nurse, Yumiko [phone]), and informed her of above. Yumiko advised that Pt will need Stretcher transport and does not have any special equipment that need to accompany him.    SW arranged St[facility]cher Transport via [facility]. Requested pick-up time is 2:15 PM. Requested pick-up time does not guarantee arrival time.     MIGUEL called Angie and informed her of above.     07/17/19 1318   Post-Acute Status   Post-Acute Authorization Placement   Post-Acute Placement Status Set-up Complete   Shital Ngero, [facility]  [phone]

## 2019-07-17 NOTE — DISCHARGE SUMMARY
[facility]  General Surgery  Discharge Summary      Patient Name: Yogesh Lima  MRN: [id number]  Admission Date: 7/7/2019  Hospital Length of Stay: 10 days  Discharge Date and Time: 7/17/2019  3:24 PM  Attending Physician: Hilton Hanson MD [facility]  Vascular/Endovascular Surgery  Discharging Provider: Carmen Ng MD  Primary Care Provider: [facility]     HPI: 62yo M with medical history significant for HTN, HLD, current smoker, CAD s/p multi-vessel CABG (1993), heart failure, COPD, and known AAA (discovered April 2019)  who presented as transfer from OSH given symptomatic enlarging AAA. He presented to OSH this evening given abdominal pain of several days, worsening in nature, worst in LLQ and radiating to his back. He also was complaining of bilateral lower extremity edema, shortness of breath and dyspnea on exertion. BNP was 16,200. CT abdomen was performed given abdominal pain and history of AAA and his aneurysm was noted to be enlarged to 6.1cm from previous diameter of 4.8cm. Given this [place] [facility] [place].     Procedure(s) (LRB):  REPAIR-ANEURYSM-ABDOMINAL AORTIC-ENDOVASCULAR (AAA) (Bilateral)     Hospital Course:   Emergently underwent EVAR: Medtronic Endurant II 32 x 16 x 166 mm from R CFA, L iliac extension 16 x 20 x 156mm with extension into R EIA with Medtronic limb 16 x 10 x 156mm - with successful treatment of AAA; large type II paired lumbar endoleaks noted and a second lumbar as well. 2+ femoral pulses and onophasic pedal doppler signals (as baseline). He was taken to the SICU intubated following the procedure where he was stabilized from a CHF perspective and underwent diuresis on POD1 with successful extubation on POD2. TTE performed post-op demonstrated EF of 25%, significantly decreased from last known ECHO. Cardiology was consulted as was interventional cards but decision was made to hold off on cath  during this admission. He had a persistent leukocytosis following surgery and cultures showed moraxella in his sputum, ID was consulted and he was treated with augmentin for 5 days. Following extubation he continued to progress well, was stepped down from ICU and remained stable on the floor. Labs and vitals were appropriate. He failed arreola removal x2, as such an indewelling catheter was left in place with plans for follow up with Urology in 1 week. He did intermittently have difficulty with agitation which continued to improve throughout his stay. He was discharge to nursing facility per wishes of his healthcare POA    Consults:   Consults (From admission, onward)        Status Ordering Provider     Inpatient consult to Cardiology  Once     Provider:  (Not yet assigned)    Completed DANISHA KEARNEY     Inpatient consult to Infectious Diseases  Once     Provider:  (Not yet assigned)    Completed BETITO BACA     Inpatient consult to Interventional Cardiology  Once     Provider:  (Not yet assigned)    Completed KIRA PRASAD     Inpatient consult to Registered Dietitian/Nutritionist  Once     Provider:  (Not yet assigned)    Completed TIBURCIO ANDRES     Inpatient consult to Social Work  Once     Provider:  (Not yet assigned)    Completed ZEB KATE     Inpatient consult to Vascular Surgery  Once     Provider:  (Not yet assigned)    Completed YEIMY CAMACHO          Significant Diagnostic Studies: Labs:   BMP:   Recent Labs   Lab 07/16/19  0430 07/17/19  0329   GLU 84 75   * 135*   K 4.6 4.6    102   CO2 21* 23   BUN 22 21   CREATININE 1.0 1.0   CALCIUM 9.4 9.6   MG 1.8 1.9    and CBC   Recent Labs   Lab 07/16/19  0430 07/17/19  0329   WBC 6.22 5.30   HGB 9.4* 10.0*   HCT 30.0* 31.8*    206       Pending Diagnostic Studies:     Procedure Component Value Units Date/Time    APTT [[id number]] Collected:  07/09/19 0100    Order Status:  Sent Lab Status:  In process Updated:   07/09/19 0115    Specimen:  Blood         Final Active Diagnoses:    Diagnosis Date Noted POA    PRINCIPAL PROBLEM:  AAA (abdominal aortic aneurysm) without rupture [I71.4] 04/05/2019 Yes    Alteration in skin integrity due to moisture [R23.9] 07/15/2019 Yes    Hypotension [I95.9] 07/12/2019 Unknown    Moraxella catarrhalis pneumonia [J15.6] 07/11/2019 Unknown    CHF (congestive heart failure) [I50.9]  Yes    NSTEMI (non-ST elevated myocardial infarction) [I21.4] 07/08/2019 Unknown    Bilateral carotid artery stenosis [I65.23]  Unknown    Encounter for observation for other suspected diseases and conditions ruled out [Z03.89]  Not Applicable    Acute on chronic respiratory failure with hypoxia and hypercapnia [J96.21, J96.22] 04/05/2019 Yes    Aneurysm of infrarenal abdominal aorta [I71.4] 04/05/2019 Yes    Benign hypertensive heart and kidney disease with HF and CKD [I13.0] 04/05/2019 Yes    CAD (coronary artery disease) [I25.10] 04/05/2019 Yes    Acute on chronic combined systolic and diastolic congestive heart failure [I50.43] 03/26/2019 Yes    CKD (chronic kidney disease) stage 3, GFR 30-59 ml/min [N18.3] 03/08/2019 Yes    Tobacco abuse [Z72.0]  Yes    History of MI (myocardial infarction) [I25.2] 03/07/2019 Not Applicable    Peripheral vascular disease [I73.9] 03/07/2019 Yes    Chronic ischemic heart disease, unspecified [I25.9] 07/25/2012 Yes    Essential hypertension [I10] 07/25/2012 Yes      Problems Resolved During this Admission:      Patient Active Problem List    Diagnosis Date Noted    Alteration in skin integrity due to moisture 07/15/2019    Hypotension 07/12/2019    Moraxella catarrhalis pneumonia 07/11/2019    CHF (congestive heart failure)     NSTEMI (non-ST elevated myocardial infarction) 07/08/2019    Bilateral carotid artery stenosis     Encounter for observation for other suspected diseases and conditions ruled out     Acute on chronic respiratory failure with hypoxia  and hypercapnia 04/05/2019    Thrombocytopenia 04/05/2019    BPH (benign prostatic hyperplasia) 04/05/2019    COPD (chronic obstructive pulmonary disease) 04/05/2019    CAD (coronary artery disease) 04/05/2019    Constipation 04/05/2019    Aneurysm of infrarenal abdominal aorta 04/05/2019    Benign hypertensive heart and kidney disease with HF and CKD 04/05/2019    AAA (abdominal aortic aneurysm) without rupture 04/05/2019    Acute on chronic combined systolic and diastolic congestive heart failure 03/26/2019    CKD (chronic kidney disease) stage 3, GFR 30-59 ml/min 03/08/2019    Tobacco abuse     History of MI (myocardial infarction) 03/07/2019    Peripheral vascular disease 03/07/2019    Chronic ischemic heart disease, unspecified 07/25/2012    Hx of CABG 07/25/2012    Essential hypertension 07/25/2012    Mixed hyperlipidemia 07/25/2012       Discharged Condition: Good    Disposition: Another Health Care Inst*    Follow Up:  Follow-up Information     Hilton Hanson MD In 1 month.    Specialty:  Vascular Surgery  Contact information:  [address] [place] [address] [phone]             UROLOGY CLINIC In 1 week.               Patient Instructions:      CTA Runoff ABD PEL Bilat Lower Ext   Standing Status: Future Standing Exp. Date: 07/17/20     Order Specific Question Answer Comments   Is the patient allergic to iodine or contrast? Has a steroid / antihistamine prep been administered? No    Is the patient on ANY Metformin drug such as Glucophage/Glucovance?           Should be off drug 48 hours after contrast. Check renal function before restart. No    History of Kidney Disease - including: decreased kidney function, dialysis, kidney transplay, single kidney, kidney cancer, kidney surgery? None    Does the patient have high blood pressure requiring medical treatment? Yes    Diabetes? No    May the Radiologist modify the order per protocol to meet the clinical needs of the patient?  Yes      Notify your health care provider if you experience any of the following:  increased confusion or weakness     Notify your health care provider if you experience any of the following:  persistent dizziness, light-headedness, or visual disturbances     Notify your health care provider if you experience any of the following:  worsening rash     Notify your health care provider if you experience any of the following:  severe persistent headache     Notify your health care provider if you experience any of the following:  difficulty breathing or increased cough     Notify your health care provider if you experience any of the following:  redness, tenderness, or signs of infection (pain, swelling, redness, odor or green/yellow discharge around incision site)     Notify your health care provider if you experience any of the following:  severe uncontrolled pain     Notify your health care provider if you experience any of the following:  persistent nausea and vomiting or diarrhea     Notify your health care provider if you experience any of the following:  temperature >100.4     No dressing needed     Activity as tolerated     Medications:  Reconciled Home Medications:      Medication List      START taking these medications    miconazole nitrate 2% 2 % Oint  Commonly known as:  MICOTIN  Apply topically 2 (two) times daily.     nicotine 21 mg/24 hr  Commonly known as:  NICODERM CQ  Place 1 patch onto the skin once daily.     sulfamethoxazole-trimethoprim 800-160mg 800-160 mg Tab  Commonly known as:  BACTRIM DS  Take 1 tablet by mouth 2 (two) times daily. Continue until seen by urology and arreola is removed for 14 days     traZODone 100 MG tablet  Commonly known as:  DESYREL  Take 1 tablet (100 mg total) by mouth every evening.     ziprasidone 20 MG Cap  Commonly known as:  GEODON  Take 1 capsule (20 mg total) by mouth 2 (two) times daily.        CONTINUE taking these medications    aspirin 81 MG EC tablet  Commonly  known as:  ECOTRIN  Take 1 tablet (81 mg total) by mouth once daily.     atorvastatin 80 MG tablet  Commonly known as:  LIPITOR  Take 1 tablet (80 mg total) by mouth once daily.     carvedilol 3.125 MG tablet  Commonly known as:  COREG  Take 1 tablet (3.125 mg total) by mouth 2 (two) times daily.     clopidogrel 75 mg tablet  Commonly known as:  PLAVIX  Take 1 tablet (75 mg total) by mouth once daily.     furosemide 40 MG tablet  Commonly known as:  LASIX  Take 1 tablet (40 mg total) by mouth once daily.     lisinopril 2.5 MG tablet  Commonly known as:  PRINIVIL,ZESTRIL  Take 1 tablet (2.5 mg total) by mouth once daily.     mirtazapine 15 MG tablet  Commonly known as:  REMERON  Take 15 mg by mouth every evening.     polyethylene glycol 17 gram Pwpk  Commonly known as:  GLYCOLAX  Take 17 g by mouth 2 (two) times daily as needed (constipation).     tamsulosin 0.4 mg Cap  Commonly known as:  FLOMAX  Take 1 capsule (0.4 mg total) by mouth once daily.     tiotropium 18 mcg inhalation capsule  Commonly known as:  SPIRIVA  Inhale 1 capsule (18 mcg total) into the lungs once daily. Controller            Carmen Ng MD  General Surgery  [facility]

## 2019-07-17 NOTE — CARE UPDATE
Rapid Response Nurse Chart Check     Chart check completed, abnormal VS noted, bedside RNKarine contacted, no concerns   verbalized at this time, instructed to call [phone] for further concerns or assistance.

## 2019-07-17 NOTE — PLAN OF CARE
[facility]     Department of Hospital Medicine     [address] [place] [address]     [phone] after hours  [phone] fax       NURSING HOME ORDERS    07/17/2019    Admit to Nursing Home:  Regular Bed         Diagnoses:  Active Hospital Problems    Diagnosis  POA    *AAA (abdominal aortic aneurysm) without rupture [I71.4]  Yes    Alteration in skin integrity due to moisture [R23.9]  Yes    Hypotension [I95.9]  Unknown    Moraxella catarrhalis pneumonia [J15.6]  Unknown    CHF (congestive heart failure) [I50.9]  Yes    NSTEMI (non-ST elevated myocardial infarction) [I21.4]  Unknown    Bilateral carotid artery stenosis [I65.23]  Unknown    Encounter for observation for other suspected diseases and conditions ruled out [Z03.89]  Not Applicable    Acute on chronic respiratory failure with hypoxia and hypercapnia [J96.21, J96.22]  Yes    Aneurysm of infrarenal abdominal aorta [I71.4]  Yes    Benign hypertensive heart and kidney disease with HF and CKD [I13.0]  Yes    CAD (coronary artery disease) [I25.10]  Yes    Acute on chronic combined systolic and diastolic congestive heart failure [I50.43]  Yes    CKD (chronic kidney disease) stage 3, GFR 30-59 ml/min [N18.3]  Yes    Tobacco abuse [Z72.0]  Yes    History of MI (myocardial infarction) [I25.2]  Not Applicable     Inferior wall MI      Peripheral vascular disease [I73.9]  Yes    Chronic ischemic heart disease, unspecified [I25.9]  Yes    Essential hypertension [I10]  Yes      Resolved Hospital Problems   No resolved problems to display.       Patient is homebound due to:  AAA (abdominal aortic aneurysm) without rupture    Allergies:Review of patient's allergies indicates:  No Known Allergies    Vitals:     Once weekly    Diet: Cardiac    Supplement:  1 can every three times a day with meals                         Type:   Ensure        Acitivities:     - Up in a chair each morning as  tolerated   - Ambulate with assistance to bathroom   - Scheduled walks once each shift (every 8 hours)   - May ambulate independently   - May use walker, cane, or self-propelled wheelchair   - Weight bearing as tolerated     LABS:  Per facility protocol     CMP, CBC each month for 3 months   PT-INR each week for 1 month then monthly   Pre-albumin each month for 3 months   TSH every year    Nursing Precautions:     - Aspiration precautions:             - Total assistance with meals            -  Upright 90 degrees befor during and after meals             -  Suction at bedside          - Fall precautions per nursing home protocol   - Seizure precaution per [facility] protocol   - Decubitus precautions:        -  for positioning   - Pressure reducing foam mattress   - Turn patient every two hours. Use wedge pillows to anchor patient    CONSULTS:      Physical Therapy to evaluate and treat     Occupational Therapy to evaluate and treat     Speech Therapy  to evaluate and treat     Nutrition to evaluate and recommend diet     Psychiatry to evaluate and follow patients for delirium    MISCELLANEOUS CARE:      Mari Care: Empty Mari bag every shift.  Change Mari every month     Routine Skin for Bedridden Patients:  Apply moisture barrier cream to all    skin folds and wet areas in perineal area daily and after baths and                           all bowel movements.                   DIABETES CARE:       Check blood sugar:     Fingerstick blood sugar a.m and p.m.      Report CBG < 60 or > 400 to physician.                                          Insulin Sliding Scale          Glucose  Novolog Insulin Subcutaneous        0 - 60   Orange juice or glucose tablet, hold insulin      No insulin   201-250  2 units   251-300  4 units   301-350  6 units   351-400  8 units   >400   10 units then call physician      Medications: Discontinue all previous medication orders, if any. See new list below.     Close,  Yogesh   Home Medication Instructions SUSSY:[id number]    Printed on:07/17/19 0943   Medication Information                      aspirin (ECOTRIN) 81 MG EC tablet  Take 1 tablet (81 mg total) by mouth once daily.             atorvastatin (LIPITOR) 80 MG tablet  Take 1 tablet (80 mg total) by mouth once daily.             carvedilol (COREG) 3.125 MG tablet  Take 1 tablet (3.125 mg total) by mouth 2 (two) times daily.             clopidogrel (PLAVIX) 75 mg tablet  Take 1 tablet (75 mg total) by mouth once daily.                        furosemide (LASIX) 40 MG tablet  Take 1 tablet (40 mg total) by mouth once daily.             lisinopril (PRINIVIL,ZESTRIL) 2.5 MG tablet  Take 1 tablet (2.5 mg total) by mouth once daily.             miconazole nitrate 2% (MICOTIN) 2 % Oint  Apply topically 2 (two) times daily.             mirtazapine (REMERON) 15 MG tablet  Take 15 mg by mouth every evening.               nicotine (NICODERM CQ) 21 mg/24 hr  Place 1 patch onto the skin once daily.             polyethylene glycol (GLYCOLAX) 17 gram PwPk  Take 17 g by mouth 2 (two) times daily as needed (constipation).             sulfamethoxazole-trimethoprim 800-160mg (BACTRIM DS) 800-160 mg Tab  Take 1 tablet by mouth 2 (two) times daily. Continue until seen by urology and arreola is removed for 14 days             tamsulosin (FLOMAX) 0.4 mg Cap  Take 1 capsule (0.4 mg total) by mouth once daily.             tiotropium (SPIRIVA) 18 mcg inhalation capsule  Inhale 1 capsule (18 mcg total) into the lungs once daily. Controller             traZODone (DESYREL) 100 MG tablet  Take 1 tablet (100 mg total) by mouth every evening.                       _________________________________  Carmen Ng MD  07/17/2019

## 2019-07-17 NOTE — NURSING
Called Steve at [facility] at [phone]. Reached a voicemail; left a voicemail asking for a return call as soon as possible. Will try again. Betsy ovalle scheduled for 1415. [facility].

## 2019-07-17 NOTE — NURSING TRANSFER
Nursing Transfer Note      7/17/2019     Transfer to [facility]    Transfer via Stretcher    Transfer with  Pt's clothes, keys and glasses    Transported by Betsy     Medicines sent: Aspart pen, Fluconazole creams, home prescription medications    Chart send with patient: YES    Notified: Steve at [facility]

## 2019-07-17 NOTE — PT/OT/SLP PROGRESS
Occupational Therapy      Patient Name:  Yogesh Lima   MRN:  [id number]    Patient not seen today secondary to patient sedated. RN reported patient discharging this day. Attempted to see patient to see if patient would participate in therapy prior to d/c or if patient had any questions/concerns within the OT scope of practice; however, patient not verbally responsive 2' sedation.     Moira Walton OT  7/17/2019

## 2019-07-17 NOTE — PLAN OF CARE
POC reviewed w/ pt. Pt oriented only to self. Pt drowsy overnight; easily aroused. VSS on RA. Pt on tele, ST (110-140s); MD aware and came to bedside to assess. 1 time dose of PRN labetalol ordered for HR >130 or SBP >140; not needed at this time. Patient complained of pain in joellen lower extremities; no pain medication needed. Pt voids per arreola, with adequate UOP overnight. Pt tolerating regular diet with no c/o nausea. BG monitored Q4, no coverage needed. Pt slept between care. Frequent rounds made for pt safety. Call light in reach and bed in lowest position. WCTM

## 2019-07-18 NOTE — TELEPHONE ENCOUNTER
----- Message from Rosio Macias MD sent at 7/17/2019 10:55 AM CDT -----  Please schedule this patient to see BHARGAVI in 1 week for voiding trial. Thank you

## 2019-07-21 PROBLEM — Z51.5 PALLIATIVE CARE ENCOUNTER: Status: ACTIVE | Noted: 2019-01-01

## 2019-07-21 PROBLEM — I46.9 CARDIAC ARREST: Status: RESOLVED | Noted: 2019-01-01 | Resolved: 2019-01-01

## 2019-07-21 PROBLEM — R23.9 ALTERATION IN SKIN INTEGRITY DUE TO MOISTURE: Status: RESOLVED | Noted: 2019-01-01 | Resolved: 2019-01-01

## 2019-07-21 PROBLEM — K59.00 CONSTIPATION: Status: RESOLVED | Noted: 2019-01-01 | Resolved: 2019-01-01

## 2019-07-21 PROBLEM — I95.9 HYPOTENSION: Status: RESOLVED | Noted: 2019-01-01 | Resolved: 2019-01-01

## 2019-07-21 PROBLEM — Z51.5 ENCOUNTER FOR HOSPICE CARE: Status: ACTIVE | Noted: 2019-01-01

## 2019-07-21 PROBLEM — J96.22 ACUTE ON CHRONIC RESPIRATORY FAILURE WITH HYPOXIA AND HYPERCAPNIA: Status: RESOLVED | Noted: 2019-01-01 | Resolved: 2019-01-01

## 2019-07-21 PROBLEM — I21.3 STEMI (ST ELEVATION MYOCARDIAL INFARCTION): Status: RESOLVED | Noted: 2019-01-01 | Resolved: 2019-01-01

## 2019-07-21 PROBLEM — I46.9 CARDIAC ARREST: Status: ACTIVE | Noted: 2019-01-01

## 2019-07-21 PROBLEM — I13.0 BENIGN HYPERTENSIVE HEART AND KIDNEY DISEASE WITH HF AND CKD: Status: RESOLVED | Noted: 2019-01-01 | Resolved: 2019-01-01

## 2019-07-21 PROBLEM — I71.40 AAA (ABDOMINAL AORTIC ANEURYSM) WITHOUT RUPTURE: Status: RESOLVED | Noted: 2019-01-01 | Resolved: 2019-01-01

## 2019-07-21 PROBLEM — I25.10 CAD (CORONARY ARTERY DISEASE): Status: RESOLVED | Noted: 2019-01-01 | Resolved: 2019-01-01

## 2019-07-21 PROBLEM — Z51.5 COMFORT MEASURES ONLY STATUS: Status: RESOLVED | Noted: 2019-01-01 | Resolved: 2019-01-01

## 2019-07-21 PROBLEM — I73.9 PERIPHERAL VASCULAR DISEASE: Status: RESOLVED | Noted: 2019-01-01 | Resolved: 2019-01-01

## 2019-07-21 PROBLEM — Z51.5 ENCOUNTER FOR HOSPICE CARE: Status: RESOLVED | Noted: 2019-01-01 | Resolved: 2019-01-01

## 2019-07-21 PROBLEM — D69.6 THROMBOCYTOPENIA: Status: RESOLVED | Noted: 2019-01-01 | Resolved: 2019-01-01

## 2019-07-21 PROBLEM — Z51.5 PALLIATIVE CARE ENCOUNTER: Status: RESOLVED | Noted: 2019-01-01 | Resolved: 2019-01-01

## 2019-07-21 PROBLEM — N18.30 CKD (CHRONIC KIDNEY DISEASE) STAGE 3, GFR 30-59 ML/MIN: Status: RESOLVED | Noted: 2019-01-01 | Resolved: 2019-01-01

## 2019-07-21 PROBLEM — J96.21 ACUTE ON CHRONIC RESPIRATORY FAILURE WITH HYPOXIA AND HYPERCAPNIA: Status: RESOLVED | Noted: 2019-01-01 | Resolved: 2019-01-01

## 2019-07-21 PROBLEM — I25.2 HISTORY OF MI (MYOCARDIAL INFARCTION): Status: RESOLVED | Noted: 2019-01-01 | Resolved: 2019-01-01

## 2019-07-21 PROBLEM — Z71.89 GOALS OF CARE, COUNSELING/DISCUSSION: Status: ACTIVE | Noted: 2019-01-01

## 2019-07-21 PROBLEM — J15.69 MORAXELLA CATARRHALIS PNEUMONIA: Status: RESOLVED | Noted: 2019-01-01 | Resolved: 2019-01-01

## 2019-07-21 PROBLEM — J44.9 COPD (CHRONIC OBSTRUCTIVE PULMONARY DISEASE): Status: RESOLVED | Noted: 2019-01-01 | Resolved: 2019-01-01

## 2019-07-21 PROBLEM — Z71.89 GOALS OF CARE, COUNSELING/DISCUSSION: Status: RESOLVED | Noted: 2019-01-01 | Resolved: 2019-01-01

## 2019-07-21 PROBLEM — N40.0 BPH (BENIGN PROSTATIC HYPERPLASIA): Status: RESOLVED | Noted: 2019-01-01 | Resolved: 2019-01-01

## 2019-07-21 PROBLEM — Z51.5 COMFORT MEASURES ONLY STATUS: Status: ACTIVE | Noted: 2019-01-01

## 2019-07-21 PROBLEM — I21.4 NSTEMI (NON-ST ELEVATED MYOCARDIAL INFARCTION): Status: RESOLVED | Noted: 2019-01-01 | Resolved: 2019-01-01

## 2019-07-21 PROBLEM — I71.43 ANEURYSM OF INFRARENAL ABDOMINAL AORTA: Status: RESOLVED | Noted: 2019-01-01 | Resolved: 2019-01-01

## 2019-07-21 PROBLEM — I21.3 STEMI (ST ELEVATION MYOCARDIAL INFARCTION): Status: ACTIVE | Noted: 2019-01-01

## 2019-07-21 PROBLEM — I50.43 ACUTE ON CHRONIC COMBINED SYSTOLIC AND DIASTOLIC CONGESTIVE HEART FAILURE: Status: RESOLVED | Noted: 2019-01-01 | Resolved: 2019-01-01

## 2020-04-10 NOTE — PT/OT/SLP EVAL
"Occupational Therapy   Evaluation and Discharge Note    Name: Yogesh Lima  MRN: [id number]  Admitting Diagnosis:  Acute on chronic combined systolic and diastolic congestive heart failure      Recommendations:     Discharge Recommendations: home  Discharge Equipment Recommendations:  none  Barriers to discharge:  None    Assessment:     Yogesh Lima is a 63 y.o. male with a medical diagnosis of Acute on chronic combined systolic and diastolic congestive heart failure. At this time, patient is functioning at their prior level of function and does not require further acute OT services.     Plan:     During this hospitalization, patient does not require further acute OT services.  Please re-consult if situation changes.    · Plan of Care Reviewed with: patient    Subjective     Chief Complaint: "I'm blocked up"  Patient/Family Comments/goals: "I just gotta get some ex-lax or something"    Occupational Profile:  Living Environment: Pt lives alone in a trailer w/ 4 CLIVE, and has a walk in shower.   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  Patient found HOB elevated with telemetry, pulse ox (continuous) upon OT entry to room.    General Precautions: Standard, fall   Orthopedic Precautions:N/A   Braces: N/A     Occupational Performance:    Bed Mobility:    · Patient completed Rolling/Turning to Left with  independence  · Patient completed Rolling/Turning to Right with independence  · Patient completed Scooting/Bridging with independence  · Patient completed Supine to Sit with independence  · Patient completed Sit to Supine with " Called pt, spoke to pt, informed pt regarding information below    independence    Functional Mobility/Transfers:  · Patient completed Sit <> Stand Transfer with independence  with  no assistive device   · Functional Mobility: Able to walk long household distances w/ no AD    Activities of Daily Living:  · Lower Body Dressing: independence seated at EOB  · Toileting: independence seated on the toilet    Cognitive/Visual Perceptual:  Cognitive/Psychosocial Skills:     -       Oriented to: Person, Place, Time and Situation   -       Follows Commands/attention:Follows multistep  commands  -       Communication: clear/fluent  -       Memory: No Deficits noted  -       Safety awareness/insight to disability: intact   -       Mood/Affect/Coping skills/emotional control: Appropriate to situation  Visual/Perceptual:      -Impaired  acuity glasses worn during eval.    Physical Exam:  Balance:    -       grossly fair +  Dominant hand:    -       RH  Upper Extremity Range of Motion:     -       Right Upper Extremity: WNL  -       Left Upper Extremity: WNL  Upper Extremity Strength:    -       Right Upper Extremity: WFL  -       Left Upper Extremity: WFL   Strength:    -       Right Upper Extremity: WFL  -       Left Upper Extremity: WFL  Fine Motor Coordination:    Gross motor coordination:   WFL    AMPAC 6 Click ADL:  AMPAC Total Score: 24    Treatment & Education:  -Pt edu on OT role/POC  -Importance of OOB activity with staff assistance  -Safety during functional t/f and mobility  - White board updated  - Multiple self care tasks/functional mobility completed-- assistance level noted above  - All questions/concerns answered within OT scope of practice    Education:    Patient left HOB elevated with all lines intact, call button in reach and RN notified    GOALS:   Multidisciplinary Problems     Occupational Therapy Goals     Not on file          Multidisciplinary Problems (Resolved)        Problem: Occupational Therapy Goal    Goal Priority Disciplines Outcome Interventions    Occupational Therapy Goal   (Resolved)     OT, PT/OT Outcome(s) achieved                    History:     Past Medical History:   Diagnosis Date    Coronary artery disease     Hyperlipidemia     Hypertension        History reviewed. No pertinent surgical history.    Time Tracking:     OT Date of Treatment: 04/09/19  OT Start Time: 1021  OT Stop Time: 1035  OT Total Time (min): 14 min    Billable Minutes:Evaluation 14 mins    Alden Bowden OT  4/9/2019

## 2023-11-07 NOTE — ASSESSMENT & PLAN NOTE
- High suspicious is Type II NSTEMI (demand ischemia) especially in the setting of having hypotensive episode and provoked by underlying extensive coronary artery disease   - EVAR extended through common iliac arteries   - Dobumatin is less likely the appropriate pressors in this case (NSTEMI) consider alternative vasopressor.     Recommendations:   - Continue on Aspirin 81 mg PO daily and Plavix 75 mg PO daily   - Wean off weaning off sedation (Precedex) and discontinuing Dobutamine   - No plan for LHC at this moment  - Defer management to primary team and consult service.   - Discussed with interventional cardiology staff and primary team    Universal Safety Interventions

## (undated) DEVICE — SUT SILK 2-0 SH 18IN BLACK

## (undated) DEVICE — SHEATH INTRODUCER 16FX28CM

## (undated) DEVICE — SUT 4-0 12-18IN SILK BLACK

## (undated) DEVICE — SYR MED RAD 150ML

## (undated) DEVICE — CATH DORADO 10X4

## (undated) DEVICE — TUBING CNTRST INJ ADPT 72IN

## (undated) DEVICE — CATH PIGTAIL

## (undated) DEVICE — SEE MEDLINE ITEM 152622

## (undated) DEVICE — CLIP MED TICALL

## (undated) DEVICE — DRAPE STERI INSTRUMENT 1018

## (undated) DEVICE — APPLICATOR CHLORAPREP ORN 26ML

## (undated) DEVICE — GUIDEWIRE STARTER J CRV 260CM

## (undated) DEVICE — STOPCOCK 1 WAY PLASTIC

## (undated) DEVICE — SUT VICRYL 3-0 27 SH

## (undated) DEVICE — SUT 2-0 12-18IN SILK

## (undated) DEVICE — SET DECANTER MEDICHOICE

## (undated) DEVICE — SYR 50CC LL

## (undated) DEVICE — INTRODUCER VASC RADPQ 10FRX10C

## (undated) DEVICE — INTRODUCER VASC RADPQ 6FRX10CM

## (undated) DEVICE — CATH ANGIO PGTL 5F 0.35 70CM

## (undated) DEVICE — CATH GLIDE ANGLED 5FR 65CM

## (undated) DEVICE — PACK DRAPE UNIVERSAL CONVERTOR

## (undated) DEVICE — GUIDEWIRE LAUREATE 035IN 260CM

## (undated) DEVICE — DRESSING ABSRBNT ISLAND 3.6X8

## (undated) DEVICE — STAPLER SKIN PROXIMATE WIDE

## (undated) DEVICE — Device

## (undated) DEVICE — GAUZE SPONGE 4X4 12 PLY NS

## (undated) DEVICE — DEVICE PERCLOSE SUT CLSR 6FR

## (undated) DEVICE — SUT 3-0 12-18IN SILK

## (undated) DEVICE — INFLATOR ENCORE

## (undated) DEVICE — DRESSING TRANS 4X4 TEGADERM

## (undated) DEVICE — VISE RADIFOCUS MULTI TORQUE

## (undated) DEVICE — SUT MCRYL PLUS 4-0 PS2 27IN

## (undated) DEVICE — HEMOSTAT SURGICEL 4X8IN

## (undated) DEVICE — SHEATH INTRODUCER 12FX28CM

## (undated) DEVICE — BLLN CAT CODA 32MM

## (undated) DEVICE — COVER INSTR ELASTIC BAND 40X20

## (undated) DEVICE — LOOP VESSEL BLUE MAXI

## (undated) DEVICE — ELECTRODE REM PLYHSV RETURN 9

## (undated) DEVICE — SYR ONLY LUER LOCK 20CC

## (undated) DEVICE — SPONGE GAUZE 16PLY 4X4

## (undated) DEVICE — SUT 2-0 VICRYL / SH (J417)

## (undated) DEVICE — GUIDEWIRE STF .035X260CM ANG

## (undated) DEVICE — SEE MEDLINE ITEM 152487

## (undated) DEVICE — SYR 10CC LUER LOCK

## (undated) DEVICE — TUBING HIGH PRESSURE

## (undated) DEVICE — SOL NS 1000CC

## (undated) DEVICE — CANNULA VESSEL

## (undated) DEVICE — GUIDEWIRE AMPLATZ STIFF 260X7

## (undated) DEVICE — SUT PROLENE 5-0 24 C-1 BL

## (undated) DEVICE — SEE MEDLINE ITEM 153688

## (undated) DEVICE — FLOWSWITCH HP 1-W W/O LL

## (undated) DEVICE — DRAPE INCISE IOBAN 2 23X33IN

## (undated) DEVICE — SEE MEDLINE ITEM 157131